# Patient Record
Sex: FEMALE | Race: WHITE | NOT HISPANIC OR LATINO | Employment: OTHER | URBAN - METROPOLITAN AREA
[De-identification: names, ages, dates, MRNs, and addresses within clinical notes are randomized per-mention and may not be internally consistent; named-entity substitution may affect disease eponyms.]

---

## 2017-03-27 DIAGNOSIS — Z32.01 PREGNANCY TEST POSITIVE: Primary | ICD-10-CM

## 2017-04-06 ENCOUNTER — PRENATAL OFFICE VISIT (OUTPATIENT)
Dept: NURSING | Facility: CLINIC | Age: 27
End: 2017-04-06
Payer: COMMERCIAL

## 2017-04-06 DIAGNOSIS — Z32.01 PREGNANCY TEST POSITIVE: Primary | ICD-10-CM

## 2017-04-06 LAB
ABO + RH BLD: NORMAL
ABO + RH BLD: NORMAL
BETA HCG QUAL IFA URINE: POSITIVE
BLD GP AB SCN SERPL QL: NORMAL
BLOOD BANK CMNT PATIENT-IMP: NORMAL
ERYTHROCYTE [DISTWIDTH] IN BLOOD BY AUTOMATED COUNT: 12.6 % (ref 10–15)
HCT VFR BLD AUTO: 38.5 % (ref 35–47)
HGB BLD-MCNC: 12.9 G/DL (ref 11.7–15.7)
MCH RBC QN AUTO: 30.4 PG (ref 26.5–33)
MCHC RBC AUTO-ENTMCNC: 33.5 G/DL (ref 31.5–36.5)
MCV RBC AUTO: 91 FL (ref 78–100)
PLATELET # BLD AUTO: 353 10E9/L (ref 150–450)
RBC # BLD AUTO: 4.25 10E12/L (ref 3.8–5.2)
SPECIMEN EXP DATE BLD: NORMAL
WBC # BLD AUTO: 8.8 10E9/L (ref 4–11)

## 2017-04-06 PROCEDURE — 86900 BLOOD TYPING SEROLOGIC ABO: CPT | Performed by: OBSTETRICS & GYNECOLOGY

## 2017-04-06 PROCEDURE — 86901 BLOOD TYPING SEROLOGIC RH(D): CPT | Performed by: OBSTETRICS & GYNECOLOGY

## 2017-04-06 PROCEDURE — 84703 CHORIONIC GONADOTROPIN ASSAY: CPT | Performed by: OBSTETRICS & GYNECOLOGY

## 2017-04-06 PROCEDURE — 99207 ZZC NO CHARGE NURSE ONLY: CPT

## 2017-04-06 PROCEDURE — 87340 HEPATITIS B SURFACE AG IA: CPT | Performed by: OBSTETRICS & GYNECOLOGY

## 2017-04-06 PROCEDURE — 86762 RUBELLA ANTIBODY: CPT | Performed by: OBSTETRICS & GYNECOLOGY

## 2017-04-06 PROCEDURE — 87086 URINE CULTURE/COLONY COUNT: CPT | Performed by: OBSTETRICS & GYNECOLOGY

## 2017-04-06 PROCEDURE — 36415 COLL VENOUS BLD VENIPUNCTURE: CPT | Performed by: OBSTETRICS & GYNECOLOGY

## 2017-04-06 PROCEDURE — 86850 RBC ANTIBODY SCREEN: CPT | Performed by: OBSTETRICS & GYNECOLOGY

## 2017-04-06 PROCEDURE — 86780 TREPONEMA PALLIDUM: CPT | Performed by: OBSTETRICS & GYNECOLOGY

## 2017-04-06 PROCEDURE — 87389 HIV-1 AG W/HIV-1&-2 AB AG IA: CPT | Performed by: OBSTETRICS & GYNECOLOGY

## 2017-04-06 PROCEDURE — 85027 COMPLETE CBC AUTOMATED: CPT | Performed by: OBSTETRICS & GYNECOLOGY

## 2017-04-06 RX ORDER — ESCITALOPRAM OXALATE 20 MG/1
20 TABLET ORAL DAILY
COMMUNITY
End: 2017-08-10

## 2017-04-06 NOTE — MR AVS SNAPSHOT
After Visit Summary   4/6/2017    Paulo Lema    MRN: [id number]           Patient Information     Date Of Birth          1990        Visit Information        Provider Department      4/6/2017 9:00 AM RI PRENATAL NURSE [facility]        Today's Diagnoses     Pregnancy test positive    -  1       Follow-ups after your visit        Your next 10 appointments already scheduled     Apr 14, 2017 10:00 AM CDT   New Prenatal with Karissa Wilkins MD   [facility] ([facility])    [address] [place] [id number] [phone]            Apr 14, 2017 11:15 AM CDT   US OB < 14 WEEKS WITH TRANSVAGINAL SINGLE with RIUS1   [facility] ([facility])    [address]  [address]  [facility] [address] [phone]           Please bring a list of your medicines (including vitamins, minerals and over-the-counter drugs). Also, tell your doctor about any allergies you may have. Wear comfortable clothes and leave your valuables at home.  If you re less than 20 weeks drink four 8-ounce glasses of fluid an hour before your exam. If you need to empty your bladder before your exam, try to release only a little urine. Then, drink another glass of fluid.  You may have up to two family members in the exam room. If you bring a small child, an adult must be there to care for him or her.  Please call the Imaging Department at your exam site with any questions.              Future tests that were ordered for you today     Open Future Orders        Priority Expected Expires Ordered    US OB < 14 Weeks w Transvaginal Routine  4/6/2018 4/6/2017            Who to contact     If you have questions or need follow up information about today's clinic visit or your schedule please contact [facility] directly at [phone].  Normal or non-critical lab and imaging results will be communicated to you by  "MyChart, letter or phone within 4 business days after the clinic has received the results. If you do not hear from us within 7 days, please contact the clinic through Carlypsohart or phone. If you have a critical or abnormal lab result, we will notify you by phone as soon as possible.  Submit refill requests through Payment plugin or call your pharmacy and they will forward the refill request to us. Please allow 3 business days for your refill to be completed.          Additional Information About Your Visit        CarlypsoharBakedCode Information     Payment plugin lets you send messages to your doctor, view your test results, renew your prescriptions, schedule appointments and more. To sign up, go to [email or web address] . Click on \"Log in\" on the left side of the screen, which will take you to the Welcome page. Then click on \"Sign up Now\" on the right side of the page.     You will be asked to enter the access code listed below, as well as some personal information. Please follow the directions to create your username and password.     Your access code is: [email or web address]  Expires: 2017 11:26 AM     Your access code will  in 90 days. If you need help or a new code, please call your [place] clinic or [phone].        Care EveryWhere ID     This is your Care EveryWhere ID. This could be used by other organizations to access your [place] medical records  [id number]        Your Vitals Were     Last Period                   02/10/2017 (Exact Date)            Blood Pressure from Last 3 Encounters:   No data found for BP    Weight from Last 3 Encounters:   No data found for Wt              We Performed the Following     ABO/Rh type and screen     Anti Treponema     Beta HCG qual IFA urine     CBC with platelets     Hepatitis B surface antigen     HIV Antigen Antibody Combo     Rubella Antibody IgG Quantitative     Urine Culture Aerobic Bacterial        Primary Care Provider Office Phone # Fax #    Daksha Mack [phone] " [phone]       [facility] [place] [address]  [place] [address]        Thank you!     Thank you for choosing [facility]  for your care. Our goal is always to provide you with excellent care. Hearing back from our patients is one way we can continue to improve our services. Please take a few minutes to complete the written survey that you may receive in the mail after your visit with us. Thank you!             Your Updated Medication List - Protect others around you: Learn how to safely use, store and throw away your medicines at www.disposemymeds.org.          This list is accurate as of: 4/6/17 11:26 AM.  Always use your most recent med list.                   Brand Name Dispense Instructions for use    escitalopram 20 MG tablet    LEXAPRO     Take 20 mg by mouth daily

## 2017-04-07 LAB
BACTERIA SPEC CULT: NO GROWTH
HBV SURFACE AG SERPL QL IA: NONREACTIVE
HIV 1+2 AB+HIV1 P24 AG SERPL QL IA: NORMAL
MICRO REPORT STATUS: NORMAL
RUBV IGG SERPL IA-ACNC: 21 IU/ML
SPECIMEN SOURCE: NORMAL
T PALLIDUM IGG+IGM SER QL: NEGATIVE

## 2017-04-14 ENCOUNTER — PRENATAL OFFICE VISIT (OUTPATIENT)
Dept: OBGYN | Facility: CLINIC | Age: 27
End: 2017-04-14
Payer: COMMERCIAL

## 2017-04-14 ENCOUNTER — RADIANT APPOINTMENT (OUTPATIENT)
Dept: ULTRASOUND IMAGING | Facility: CLINIC | Age: 27
End: 2017-04-14
Attending: OBSTETRICS & GYNECOLOGY
Payer: COMMERCIAL

## 2017-04-14 VITALS
WEIGHT: 171.8 LBS | BODY MASS INDEX: 30.44 KG/M2 | DIASTOLIC BLOOD PRESSURE: 60 MMHG | HEIGHT: 63 IN | SYSTOLIC BLOOD PRESSURE: 102 MMHG | TEMPERATURE: 98 F

## 2017-04-14 DIAGNOSIS — Z34.00 SUPERVISION OF NORMAL FIRST PREGNANCY, ANTEPARTUM: Primary | ICD-10-CM

## 2017-04-14 DIAGNOSIS — Z32.01 PREGNANCY TEST POSITIVE: ICD-10-CM

## 2017-04-14 PROCEDURE — G0145 SCR C/V CYTO,THINLAYER,RESCR: HCPCS | Performed by: OBSTETRICS & GYNECOLOGY

## 2017-04-14 PROCEDURE — 76801 OB US < 14 WKS SINGLE FETUS: CPT | Performed by: OBSTETRICS & GYNECOLOGY

## 2017-04-14 PROCEDURE — 87591 N.GONORRHOEAE DNA AMP PROB: CPT | Performed by: OBSTETRICS & GYNECOLOGY

## 2017-04-14 PROCEDURE — 99207 ZZC FIRST OB VISIT: CPT | Performed by: OBSTETRICS & GYNECOLOGY

## 2017-04-14 PROCEDURE — 87491 CHLMYD TRACH DNA AMP PROBE: CPT | Performed by: OBSTETRICS & GYNECOLOGY

## 2017-04-14 RX ORDER — PRENATAL VIT/IRON FUM/FOLIC AC 27MG-0.8MG
1 TABLET ORAL DAILY
COMMUNITY
End: 2018-06-05

## 2017-04-14 NOTE — MR AVS SNAPSHOT
After Visit Summary   4/14/2017    Paulo Lema    MRN: [id number]           Patient Information     Date Of Birth          1990        Visit Information        Provider Department      4/14/2017 10:00 AM Karissa Wilkins MD [facility]        Today's Diagnoses     Supervision of normal first pregnancy, antepartum    -  1      Care Instructions    Early Pregnancy Information:    Nausea & Vomiting  Women experience this problem in varying degrees during pregnancy and you may have different experiences in subsequent pregnancies. Some women experience  morning sickness,  but it is also common to experience nausea any time throughout the day.  Listen to your body and eat the kinds of foods that make you feel best.  Suggestions for Diet  The most important rule is to eat small amounts often - even if you are not hungry. Try not to go more than three hours without eating during the day or ten hours at night. An empty stomach triggers nausea.   Eat slowly and avoid foods that are spicy or high in fat. These are difficult to digest. Do not overfill your stomach.   Drink fruit juices, water and milk between meals.   Eat a few crackers, dry toast or vanilla wafers before getting out of bed in the morning. Stay in bed 15-20 minutes after eating.  Give yourself extra time in the morning.   Do not brush your teeth until you have been up for a while.   Do not skip breakfast.   Have a snack at bedtime that includes both carbohydrates and protein, e.g., peanut butter toast or hot chocolate made with milk.  A specific food or drink may trigger nausea in one woman and alleviate it in another. Find out what works best for you and eliminate the foods that cause nausea.   Most women tolerate ice cold drinks and foods best. Sherbet and fruit juices are good examples.   Try avoid coffee and products containing caffeine; it increases stomach acid. About 1 cup of coffee daily is considered safe in  pregnancy, but this may be triggering your nausea.  Avoid smoking: it also increases stomach acid.  Rest  Your body requires more sleep in early pregnancy. Try to get plenty of sleep at night or take a short nap during the day. Being tired does often trigger nausea. If your nausea is worse in the evening, try taking a nap before dinner.  Exercise  Energy levels are normally low in early pregnancy and exercise may be the last thing you d think of to relieve nausea, but getting out and walking briskly for 30 minutes each day will increase metabolism, relieve stress and psychologically improve your outlook.  Vitamins  Vitamins B6 and Vitamin C may help improve nausea. There have been no definite studies to prove this effective, but some women do improve.   To prevent nausea take: 25 mg of Vitamin B6 daily. You can take this up to 3 times daily. You may have to cut a tablet in half to get to 25mg   Take: 500 mg. Vitamin C daily.   Yogurt is a good source of the B Vitamins.   If taking your prenatal vitamin increases or causes nausea, stop for 7-10 days, then try again. You can also try switching to a formulation without iron in early pregnancy.   Medication and other remedies  Unisom, taken as directed, may be used with Vitamin B6. Take 25mg of unisom at night, this can make you drowsy.   Cassi tablets, 250 mg, 2-4 times per day   Acupressure Wrist Bands (Sea Bands)   Peppermint or cassi decaffeinated tea   Peppermint gum or candy  Inform your doctor if:  You cannot keep any solid food down for 24 hours.   You cannot keep liquids down.   You are losing weight.   You are running a temperature greater than 100  F.    Common Ailments:  Below is a list of medications that are safe to take in pregnancy. This is not everything that is safe, but merely a list of over the counter options to get you through frequently experienced symptoms.     Upper Respiratory Infections:  Sinus congestion and colds - Plain Sudafed, Tylenol  Sinus  Antihistamines -  Claritin, Benadryl, Zyrtec  Avoid nasal sprays, except for Saline only.  Sore Throat:  Lozenges - Cepacol, Chloraseptic  Cough drops - Halls, Vicks, or lemon drops  Headache, Pain, or Fever:  Tylenol or other acetaminophen products: 650-1000 mg every 6-8 hours (up to 3 gm/24 hours) as needed. If not relieved, call the office.  Heartburn:  Sodium-free antacids - Gaviscon, Maalox, Mylanta, Tums  Acid Reflux - Prilosec, Zantac 75 mg 1 to 2 times daily  Gas: Simethicone products - Gas-X  Constipation:  Fiber supplements - Fibercon, Metamucil (powder, capsules, or wafers)  Stool softeners - Colace (Docusate Sodium),   Laxatives -Milk of Magnesia, Senna, Miralax  Diarrhea:  Imodium, Imodium AD  Avoid dairy products and stop prenatal vitamins until diarrhea subsides. If not resolved in 24-36 hours, call the office.  Insect Bites and Rashes:  Lotions - Calamine, Caladryl, Hydrocortisone 1%, DEET bug spray  Sprays - DEET bug spray  If rash is unusual or persists, call the office.  Hemorrhoids:  Preparation H, Anusol, Tucks pads      Call the clinic ([facility] [phone], [place] [phone]) right away with any of the following concerns. These phones are answered at all hours:    1.   Severe abdominal pain or cramping, that does not go away with rest and hydration    2.   Vaginal bleeding.      Continue your prenatal vitamins daily.    Please call with any additional concerns that your have, and continue your prenatal visits every four weeks!         Follow-ups after your visit        Your next 10 appointments already scheduled     Apr 14, 2017 11:15 AM CDT   US OB < 14 WEEKS WITH TRANSVAGINAL SINGLE with RIUS1   [facility]    [address]  [facility] [address] [phone]           Please bring a list of your medicines (including vitamins, minerals and over-the-counter drugs). Also, tell your doctor about any  "allergies you may have. Wear comfortable clothes and leave your valuables at home.  If you re less than 20 weeks drink four 8-ounce glasses of fluid an hour before your exam. If you need to empty your bladder before your exam, try to release only a little urine. Then, drink another glass of fluid.  You may have up to two family members in the exam room. If you bring a small child, an adult must be there to care for him or her.  Please call the Imaging Department at your exam site with any questions.              Who to contact     If you have questions or need follow up information about today's clinic visit or your schedule please contact [facility] directly at [phone].  Normal or non-critical lab and imaging results will be communicated to you by Insuritashart, letter or phone within 4 business days after the clinic has received the results. If you do not hear from us within 7 days, please contact the clinic through Insuritashart or phone. If you have a critical or abnormal lab result, we will notify you by phone as soon as possible.  Submit refill requests through Moondo or call your pharmacy and they will forward the refill request to us. Please allow 3 business days for your refill to be completed.          Additional Information About Your Visit        InsuritasharBrightTALK Information     Moondo lets you send messages to your doctor, view your test results, renew your prescriptions, schedule appointments and more. To sign up, go to [email or web address] . Click on \"Log in\" on the left side of the screen, which will take you to the Welcome page. Then click on \"Sign up Now\" on the right side of the page.     You will be asked to enter the access code listed below, as well as some personal information. Please follow the directions to create your username and password.     Your access code is: [email or web address]  Expires: 2017 11:26 AM     Your access code will  in 90 days. If you need help or a new code, " "please call your [place] clinic or [phone].        Care EveryWhere ID     This is your Care EveryWhere ID. This could be used by other organizations to access your [place] medical records  [id number]        Your Vitals Were     Temperature Height Last Period Breastfeeding? BMI (Body Mass Index)       98  F (36.7  C) (Oral) 5' 3\" (1.6 m) 02/10/2017 (Exact Date) No 30.43 kg/m2        Blood Pressure from Last 3 Encounters:   04/14/17 102/60    Weight from Last 3 Encounters:   04/14/17 171 lb 12.8 oz (77.9 kg)              Today, you had the following     No orders found for display       Primary Care Provider Office Phone # Fax #    Daksha Mack [phone]       [facility] [address] [place] [address]  [place] [address]        Thank you!     Thank you for choosing [facility]  for your care. Our goal is always to provide you with excellent care. Hearing back from our patients is one way we can continue to improve our services. Please take a few minutes to complete the written survey that you may receive in the mail after your visit with us. Thank you!             Your Updated Medication List - Protect others around you: Learn how to safely use, store and throw away your medicines at www.disposemymeds.org.          This list is accurate as of: 4/14/17 10:30 AM.  Always use your most recent med list.                   Brand Name Dispense Instructions for use    escitalopram 20 MG tablet    LEXAPRO     Take 20 mg by mouth daily       prenatal multivitamin  plus iron 27-0.8 MG Tabs per tablet      Take 1 tablet by mouth daily         "

## 2017-04-14 NOTE — PROGRESS NOTES
"This is a 26 year old female patient,   who presents for her first obstetrical visit.    EDC 2017 by LMP which makes her 9w0d today.  Her cycles are regular.  This is a planned pregnancy, she and her  have been trying for about 6 months.     Paulo works as a .     Current Issues include: daily nausea and multiple episodes of emesis throughout the day. This has been improved with B6 and iman supplements. She has unisom for night if needed. She eats throughout the day which does help nausea.    Since her last LMP she denies use of alcohol, tobacco and street drugs.    OBhx: never pregnant  Obstetric History       T0      TAB0   SAB0   E0   M0   L0       # Outcome Date GA Lbr González/2nd Weight Sex Delivery Anes PTL Lv   1 Current                   ROS: Ten point review of systems was reviewed and negative except the above.    HISTORY:  No past medical history on file.  No past surgical history on file.  No family history on file.  Social History     Social History     Marital status:      Spouse name: Brennan     Number of children: N/A     Years of education: N/A     Occupational History     Teacher      Social History Main Topics     Smoking status: Never Smoker     Smokeless tobacco: Not on file     Alcohol use No     Drug use: No     Sexual activity: Yes     Partners: Male     Other Topics Concern     Not on file     Social History Narrative     No narrative on file     Current Outpatient Prescriptions   Medication Sig     escitalopram (LEXAPRO) 20 MG tablet Take 20 mg by mouth daily     No current facility-administered medications for this visit.      No Known Allergies    Past medical, surgical, social and family history were reviewed and updated in [facility].      EXAM:  /60  Temp 98  F (36.7  C) (Oral)  Ht 5' 3\" (1.6 m)  Wt 171 lb 12.8 oz (77.9 kg)  LMP 02/10/2017 (Exact Date)  Breastfeeding? No  BMI 30.43 kg/m2     Gen:  no acute distress, " comfortable  HENT: No scleral injection or icterus  CV: Regular rate and rhythm, no murmur  Resp: CTAB, Normal work of breathing, no cough  Breast: No visible masses or suspicious skin changes.  No discrete or dominant masses to palpation.  No nipple discharge. No axillary lymphadenopathy.  GI: Abdomen soft, non-tender. No masses, organomegaly  Skin: No suspicious lesions or rashes  Psychiatric: mentation appears normal and affect bright   Pelvis: External genitalia within normal limits. Urethra is without lesion. Bladder is nontender.   On speculum exam, cervix is without lesion and vagina is normal without lesion or discharge. Pap smear obtained without incident.       Recent Labs   Lab Test  17   1014   ABO  A   RH   Pos   AS  Neg     Rhogam not indicated     Recent Labs   Lab Test  17   1014   HEPBANG  Nonreactive   HIAGAB  Nonreactive   HIV-1 p24 Ag & HIV-1/HIV-2 Ab Not Detected     RUQIGG  21       Treponemal antibody neg    CBC RESULTS:   Recent Labs   Lab Test  17   1014   WBC  8.8   RBC  4.25   HGB  12.9   HCT  38.5   MCV  91   MCH  30.4   MCHC  33.5   RDW  12.6   PLT  353       ASSESSMENT:  26 year old  at 9w0d dated by LMP, US pending today, here for NOB visit.        PLAN:    1)Prenatal labs reviewed. She has no questions.  2) EDUCATION : RECOMMENDED WEIGHT GAIN: 11-20 lbs given Body mass index is 30.43 kg/(m^2)..   - Instructed on best evidence for: healthy diet and foods to avoid; exercise and activity during pregnancy; and maintenance of a generally healthy lifestyle. Reviewed early pregnancy education, provider coverage, labor and delivery, and prenatal visits.  Discussed the harms, benefits, side effects and alternative therapies for current prescribed and OTC medications.  - recommend PNV  3) Discussed options for screening for chromosomal anomalies, including first screen, noninvasive prenatal testing, CVS/amniocentesis, quad screen, and ultrasound at 18-20 weeks. She is  electing first trimester screen and ultrasound at 18-20 weeks. Genetic counseling referral placed.    Follow up in 5 weeks. She is encouraged to call sooner with questions or concerns.    Karissa Wilkins MD   Obstetrics and Gynecology

## 2017-04-14 NOTE — LETTER
April 20, 2017      Paulo Lema  [address] [facility] [address]    Dear ,      I am happy to inform you that your recent cervical cancer screening test (PAP smear) was normal.      Preventative screenings such as this help to ensure your health for years to come. You should repeat a pap smear in 3 years, unless otherwise directed.      You will still need to return to the clinic every year for your annual exam and other preventive tests.     Please contact the clinic at [phone] if you have further questions.       Sincerely,      Karissa Wilkins MD

## 2017-04-14 NOTE — PATIENT INSTRUCTIONS
Early Pregnancy Information:    Nausea & Vomiting  Women experience this problem in varying degrees during pregnancy and you may have different experiences in subsequent pregnancies. Some women experience  morning sickness,  but it is also common to experience nausea any time throughout the day.  Listen to your body and eat the kinds of foods that make you feel best.  Suggestions for Diet  The most important rule is to eat small amounts often - even if you are not hungry. Try not to go more than three hours without eating during the day or ten hours at night. An empty stomach triggers nausea.   Eat slowly and avoid foods that are spicy or high in fat. These are difficult to digest. Do not overfill your stomach.   Drink fruit juices, water and milk between meals.   Eat a few crackers, dry toast or vanilla wafers before getting out of bed in the morning. Stay in bed 15-20 minutes after eating.  Give yourself extra time in the morning.   Do not brush your teeth until you have been up for a while.   Do not skip breakfast.   Have a snack at bedtime that includes both carbohydrates and protein, e.g., peanut butter toast or hot chocolate made with milk.  A specific food or drink may trigger nausea in one woman and alleviate it in another. Find out what works best for you and eliminate the foods that cause nausea.   Most women tolerate ice cold drinks and foods best. Sherbet and fruit juices are good examples.   Try avoid coffee and products containing caffeine; it increases stomach acid. About 1 cup of coffee daily is considered safe in pregnancy, but this may be triggering your nausea.  Avoid smoking: it also increases stomach acid.  Rest  Your body requires more sleep in early pregnancy. Try to get plenty of sleep at night or take a short nap during the day. Being tired does often trigger nausea. If your nausea is worse in the evening, try taking a nap before dinner.  Exercise  Energy levels are normally low in early  pregnancy and exercise may be the last thing you d think of to relieve nausea, but getting out and walking briskly for 30 minutes each day will increase metabolism, relieve stress and psychologically improve your outlook.  Vitamins  Vitamins B6 and Vitamin C may help improve nausea. There have been no definite studies to prove this effective, but some women do improve.   To prevent nausea take: 25 mg of Vitamin B6 daily. You can take this up to 3 times daily. You may have to cut a tablet in half to get to 25mg   Take: 500 mg. Vitamin C daily.   Yogurt is a good source of the B Vitamins.   If taking your prenatal vitamin increases or causes nausea, stop for 7-10 days, then try again. You can also try switching to a formulation without iron in early pregnancy.   Medication and other remedies  Unisom, taken as directed, may be used with Vitamin B6. Take 25mg of unisom at night, this can make you drowsy.   Ginger tablets, 250 mg, 2-4 times per day   Acupressure Wrist Bands (Sea Bands)   Peppermint or ginger decaffeinated tea   Peppermint gum or candy  Inform your doctor if:  You cannot keep any solid food down for 24 hours.   You cannot keep liquids down.   You are losing weight.   You are running a temperature greater than 100  F.    Common Ailments:  Below is a list of medications that are safe to take in pregnancy. This is not everything that is safe, but merely a list of over the counter options to get you through frequently experienced symptoms.     Upper Respiratory Infections:  Sinus congestion and colds - Plain Sudafed, Tylenol Sinus  Antihistamines -  Claritin, Benadryl, Zyrtec  Avoid nasal sprays, except for Saline only.  Sore Throat:  Lozenges - Cepacol, Chloraseptic  Cough drops - Halls, Vicks, or lemon drops  Headache, Pain, or Fever:  Tylenol or other acetaminophen products: 650-1000 mg every 6-8 hours (up to 3 gm/24 hours) as needed. If not relieved, call the office.  Heartburn:  Sodium-free antacids -  Gaviscon, Maalox, Mylanta, Tums  Acid Reflux - Prilosec, Zantac 75 mg 1 to 2 times daily  Gas: Simethicone products - Gas-X  Constipation:  Fiber supplements - Fibercon, Metamucil (powder, capsules, or wafers)  Stool softeners - Colace (Docusate Sodium),   Laxatives -Milk of Magnesia, Senna, Miralax  Diarrhea:  Imodium, Imodium AD  Avoid dairy products and stop prenatal vitamins until diarrhea subsides. If not resolved in 24-36 hours, call the office.  Insect Bites and Rashes:  Lotions - Calamine, Caladryl, Hydrocortisone 1%, DEET bug spray  Sprays - DEET bug spray  If rash is unusual or persists, call the office.  Hemorrhoids:  Preparation H, Anusol, Tucks pads      Call the clinic ([facility] [phone], [place] [phone]) right away with any of the following concerns. These phones are answered at all hours:    1.   Severe abdominal pain or cramping, that does not go away with rest and hydration    2.   Vaginal bleeding.      Continue your prenatal vitamins daily.    Please call with any additional concerns that your have, and continue your prenatal visits every four weeks!

## 2017-04-17 LAB
C TRACH DNA SPEC QL NAA+PROBE: NORMAL
N GONORRHOEA DNA SPEC QL NAA+PROBE: NORMAL
SPECIMEN SOURCE: NORMAL
SPECIMEN SOURCE: NORMAL

## 2017-04-18 LAB
COPATH REPORT: NORMAL
PAP: NORMAL

## 2017-05-02 ENCOUNTER — PRE VISIT (OUTPATIENT)
Dept: MATERNAL FETAL MEDICINE | Facility: CLINIC | Age: 27
End: 2017-05-02

## 2017-05-16 ENCOUNTER — TELEPHONE (OUTPATIENT)
Dept: OBGYN | Facility: CLINIC | Age: 27
End: 2017-05-16

## 2017-05-16 DIAGNOSIS — O21.9 NAUSEA/VOMITING IN PREGNANCY: Primary | ICD-10-CM

## 2017-05-16 RX ORDER — METOCLOPRAMIDE 10 MG/1
10 TABLET ORAL 4 TIMES DAILY PRN
Qty: 120 TABLET | Refills: 1 | Status: SHIPPED | OUTPATIENT
Start: 2017-05-16 | End: 2017-08-10

## 2017-05-16 NOTE — TELEPHONE ENCOUNTER
Reason for call: Symptom   Symptom or request: All day sickness    Duration (how long have symptoms been present): Entire pregnancy  Have you been treated for this before? Pt tried B6, iman root vitamin,    Additional comments: Pt states nausea is not going away after taking medications for over a month, sickness is all day not just in the morning    Phone Number Pt can be reached at: Cell number on file:  [phone]  Best Time: anytime  Can we leave a detailed message on this number? YES

## 2017-05-16 NOTE — TELEPHONE ENCOUNTER
Pt is requesting med for nausea.    I tried to call pt to get more info and LM.    Shelly Mathis R.N.

## 2017-05-16 NOTE — TELEPHONE ENCOUNTER
Please place orders for reglan 10mg 4 times per day as needed for her nausea.     Karissa Wilkins MD

## 2017-05-19 ENCOUNTER — TELEPHONE (OUTPATIENT)
Dept: OBGYN | Facility: CLINIC | Age: 27
End: 2017-05-19

## 2017-05-19 ENCOUNTER — PRENATAL OFFICE VISIT (OUTPATIENT)
Dept: OBGYN | Facility: CLINIC | Age: 27
End: 2017-05-19
Payer: COMMERCIAL

## 2017-05-19 VITALS
DIASTOLIC BLOOD PRESSURE: 60 MMHG | BODY MASS INDEX: 31.05 KG/M2 | TEMPERATURE: 98 F | WEIGHT: 175.3 LBS | SYSTOLIC BLOOD PRESSURE: 118 MMHG

## 2017-05-19 DIAGNOSIS — Z34.00 SUPERVISION OF NORMAL FIRST PREGNANCY, ANTEPARTUM: Primary | ICD-10-CM

## 2017-05-19 PROCEDURE — 99207 ZZC PRENATAL VISIT: CPT | Performed by: OBSTETRICS & GYNECOLOGY

## 2017-05-19 NOTE — NURSING NOTE
"Chief Complaint   Patient presents with     Prenatal Care   13w6d  Marleny Almaraz MA      Initial /60 (BP Location: Left arm, Patient Position: Chair, Cuff Size: Adult Regular)  Temp 98  F (36.7  C) (Oral)  Wt 175 lb 4.8 oz (79.5 kg)  LMP 02/10/2017 (Exact Date)  BMI 31.05 kg/m2 Estimated body mass index is 31.05 kg/(m^2) as calculated from the following:    Height as of 17: 5' 3\" (1.6 m).    Weight as of this encounter: 175 lb 4.8 oz (79.5 kg).  BP completed using cuff size: regular        The following HM Due: NONE                "

## 2017-05-19 NOTE — TELEPHONE ENCOUNTER
VAUGHN calling to notify us that pt is too far along in her pregnancy to have the first trimester screen done.     BRISA Sanches RN

## 2017-05-19 NOTE — MR AVS SNAPSHOT
After Visit Summary   5/19/2017    Paulo Lema    MRN: [id number]           Patient Information     Date Of Birth          1990        Visit Information        Provider Department      5/19/2017 11:15 AM Karissa Wilkins MD [facility]        Today's Diagnoses     Supervision of normal first pregnancy, antepartum    -  1       Follow-ups after your visit        Your next 10 appointments already scheduled     Jun 16, 2017 10:30 AM CDT   US OB > 14 WEEKS COMPLETE SINGLE with RIUS[facility]   [facility]    [address]  [address]  [facility] [id number] [phone]           Please bring a list of your medicines (including vitamins, minerals and over-the-counter drugs). Also, tell your doctor about any allergies you may have. Wear comfortable clothes and leave your valuables at home.  If you re less than 20 weeks drink four 8-ounce glasses of fluid an hour before your exam. If you need to empty your bladder before your exam, try to release only a little urine. Then, drink another glass of fluid.  You may have up to two family members in the exam room. If you bring a small child, an adult must be there to care for him or her.  Please call the Imaging Department at your exam site with any questions.            Jun 16, 2017 11:15 AM CDT   ESTABLISHED PRENATAL with Karissa Wilkins MD   [facility]    [address] [place] [id number] [phone]              Future tests that were ordered for you today     Open Future Orders        Priority Expected Expires Ordered    US OB > 14 Weeks Complete Single Routine 6/19/2017 5/19/2018 5/19/2017            Who to contact     If you have questions or need follow up information about today's clinic visit or your schedule please contact [facility] directly at [phone].  Normal or non-critical lab and  "imaging results will be communicated to you by MyChart, letter or phone within 4 business days after the clinic has received the results. If you do not hear from us within 7 days, please contact the clinic through [facility] or phone. If you have a critical or abnormal lab result, we will notify you by phone as soon as possible.  Submit refill requests through [employer] or call your pharmacy and they will forward the refill request to us. Please allow 3 business days for your refill to be completed.          Additional Information About Your Visit        [employer] Information     [employer] lets you send messages to your doctor, view your test results, renew your prescriptions, schedule appointments and more. To sign up, go to [email or web address] . Click on \"Log in\" on the left side of the screen, which will take you to the Welcome page. Then click on \"Sign up Now\" on the right side of the page.     You will be asked to enter the access code listed below, as well as some personal information. Please follow the directions to create your username and password.     Your access code is: [email or web address]  Expires: 2017 11:26 AM     Your access code will  in 90 days. If you need help or a new code, please call your [facility] clinic or [phone].        Care EveryWhere ID     This is your Care EveryWhere ID. This could be used by other organizations to access your [facility] medical records  [id number]        Your Vitals Were     Temperature Last Period BMI (Body Mass Index)             98  F (36.7  C) (Oral) 02/10/2017 (Exact Date) 31.05 kg/m2          Blood Pressure from Last 3 Encounters:   17 118/60   17 102/60    Weight from Last 3 Encounters:   17 175 lb 4.8 oz (79.5 kg)   17 171 lb 12.8 oz (77.9 kg)               Primary Care Provider Office Phone # Fax #    Daksha Mack [phone]       [facility] [address] [place], [place] [address]ERT[facility]  [place] [address]        Thank you!     " Thank you for choosing [facility]  for your care. Our goal is always to provide you with excellent care. Hearing back from our patients is one way we can continue to improve our services. Please take a few minutes to complete the written survey that you may receive in the mail after your visit with us. Thank you!             Your Updated Medication List - Protect others around you: Learn how to safely use, store and throw away your medicines at www.disposemymeds.org.          This list is accurate as of: 5/19/17  1:05 PM.  Always use your most recent med list.                   Brand Name Dispense Instructions for use    escitalopram 20 MG tablet    LEXAPRO     Take 20 mg by mouth daily       metoclopramide 10 MG tablet    REGLAN    120 tablet    Take 1 tablet (10 mg) by mouth 4 times daily as needed For nausea       prenatal multivitamin  plus iron 27-0.8 MG Tabs per tablet      Take 1 tablet by mouth daily

## 2017-05-19 NOTE — TELEPHONE ENCOUNTER
Please offer patient quad screen. She can return after 15w for RN only appointment and lab draw if she would like to pursue this option.     Thanks,  Karissa Wilkins MD

## 2017-05-21 ENCOUNTER — HOSPITAL ENCOUNTER (EMERGENCY)
Facility: CLINIC | Age: 27
Discharge: HOME | End: 2017-05-22
Attending: EMERGENCY MEDICINE | Admitting: EMERGENCY MEDICINE
Payer: COMMERCIAL

## 2017-05-21 DIAGNOSIS — O21.9 NAUSEA AND VOMITING IN PREGNANCY: ICD-10-CM

## 2017-05-21 LAB
ANION GAP SERPL CALCULATED.3IONS-SCNC: 7 MMOL/L (ref 3–14)
BASOPHILS # BLD AUTO: 0 10E9/L (ref 0–0.2)
BASOPHILS NFR BLD AUTO: 0.2 %
BUN SERPL-MCNC: 6 MG/DL (ref 7–30)
CALCIUM SERPL-MCNC: 8.9 MG/DL (ref 8.5–10.1)
CHLORIDE SERPL-SCNC: 104 MMOL/L (ref 94–109)
CO2 SERPL-SCNC: 26 MMOL/L (ref 20–32)
CREAT SERPL-MCNC: 0.52 MG/DL (ref 0.52–1.04)
DIFFERENTIAL METHOD BLD: ABNORMAL
EOSINOPHIL # BLD AUTO: 0.3 10E9/L (ref 0–0.7)
EOSINOPHIL NFR BLD AUTO: 2.2 %
ERYTHROCYTE [DISTWIDTH] IN BLOOD BY AUTOMATED COUNT: 12.6 % (ref 10–15)
GFR SERPL CREATININE-BSD FRML MDRD: ABNORMAL ML/MIN/1.7M2
GLUCOSE SERPL-MCNC: 94 MG/DL (ref 70–99)
HCT VFR BLD AUTO: 34.1 % (ref 35–47)
HGB BLD-MCNC: 11.7 G/DL (ref 11.7–15.7)
IMM GRANULOCYTES # BLD: 0.1 10E9/L (ref 0–0.4)
IMM GRANULOCYTES NFR BLD: 0.5 %
LYMPHOCYTES # BLD AUTO: 2.6 10E9/L (ref 0.8–5.3)
LYMPHOCYTES NFR BLD AUTO: 19.9 %
MCH RBC QN AUTO: 30.2 PG (ref 26.5–33)
MCHC RBC AUTO-ENTMCNC: 34.3 G/DL (ref 31.5–36.5)
MCV RBC AUTO: 88 FL (ref 78–100)
MONOCYTES # BLD AUTO: 0.7 10E9/L (ref 0–1.3)
MONOCYTES NFR BLD AUTO: 5 %
NEUTROPHILS # BLD AUTO: 9.5 10E9/L (ref 1.6–8.3)
NEUTROPHILS NFR BLD AUTO: 72.2 %
NRBC # BLD AUTO: 0 10*3/UL
NRBC BLD AUTO-RTO: 0 /100
PLATELET # BLD AUTO: 321 10E9/L (ref 150–450)
POTASSIUM SERPL-SCNC: 3.4 MMOL/L (ref 3.4–5.3)
RBC # BLD AUTO: 3.87 10E12/L (ref 3.8–5.2)
SODIUM SERPL-SCNC: 137 MMOL/L (ref 133–144)
WBC # BLD AUTO: 13.1 10E9/L (ref 4–11)

## 2017-05-21 PROCEDURE — 85025 COMPLETE CBC W/AUTO DIFF WBC: CPT | Performed by: EMERGENCY MEDICINE

## 2017-05-21 PROCEDURE — 96361 HYDRATE IV INFUSION ADD-ON: CPT

## 2017-05-21 PROCEDURE — 25000128 H RX IP 250 OP 636: Performed by: EMERGENCY MEDICINE

## 2017-05-21 PROCEDURE — 96374 THER/PROPH/DIAG INJ IV PUSH: CPT

## 2017-05-21 PROCEDURE — 80048 BASIC METABOLIC PNL TOTAL CA: CPT | Performed by: EMERGENCY MEDICINE

## 2017-05-21 PROCEDURE — 99284 EMERGENCY DEPT VISIT MOD MDM: CPT | Mod: 25

## 2017-05-21 PROCEDURE — 96375 TX/PRO/DX INJ NEW DRUG ADDON: CPT

## 2017-05-21 RX ORDER — DIPHENHYDRAMINE HYDROCHLORIDE 50 MG/ML
25 INJECTION INTRAMUSCULAR; INTRAVENOUS ONCE
Status: COMPLETED | OUTPATIENT
Start: 2017-05-21 | End: 2017-05-21

## 2017-05-21 RX ORDER — METOCLOPRAMIDE HYDROCHLORIDE 5 MG/ML
10 INJECTION INTRAMUSCULAR; INTRAVENOUS ONCE
Status: COMPLETED | OUTPATIENT
Start: 2017-05-21 | End: 2017-05-21

## 2017-05-21 RX ADMIN — DIPHENHYDRAMINE HYDROCHLORIDE 25 MG: 50 INJECTION, SOLUTION INTRAMUSCULAR; INTRAVENOUS at 22:57

## 2017-05-21 RX ADMIN — METOCLOPRAMIDE 5 MG: 5 INJECTION, SOLUTION INTRAMUSCULAR; INTRAVENOUS at 22:57

## 2017-05-21 RX ADMIN — SODIUM CHLORIDE 1000 ML: 9 INJECTION, SOLUTION INTRAVENOUS at 23:12

## 2017-05-21 NOTE — ED AVS SNAPSHOT
[facility] Emergency Department    [address] [facility] [address]    Phone:  [phone]    Fax:  [phone]                                       Paulo Lema   MRN: [id number]    Department:  [facility] Emergency Department   Date of Visit:  5/21/2017           Patient Information     Date Of Birth          1990        Your diagnoses for this visit were:     Nausea and vomiting in pregnancy        You were seen by Yana Montesinos MD.      Follow-up Information     Follow up with Daksha Mack MD In 3 days.    Specialty:  Family Practice    Contact information:    [facility]  [address]   [place] [address]  [phone]          Follow up with Karissa Wilkins MD In 3 days.    Specialty:  OB/Gyn    Contact information:    [facility]  [address]  [facility] [address]  [phone]          Discharge Instructions       Please follow up closely with your Ob-gyn. Please return to the ED if your symptoms worsen or if you develop new or concerning symptoms.       Discharge Instructions  Vomiting    You have been seen today for vomiting. This is usually caused by a virus, but some bacteria, parasites, medicines or other medical conditions can cause similar symptoms. At this time your doctor does not find that your vomiting is a sign of anything dangerous or life-threatening. However, sometimes the signs of serious illness do not show up right away. If you have new or worse symptoms, you may need to be seen again in the emergency department or by your primary doctor. Remember that serious problems like appendicitis can start as vomiting.     Return to the Emergency Department if:    You keep throwing up and you are not able to keep liquids down.     You feel you are getting dehydrated, such as being very thirsty, not urinating at least every 8-12 hours, or feeling faint or lightheaded.     You develop a new fever, or  your fever continues for more than 2 days.     You have belly pain that seems worse than cramps, is in one spot, or is getting worse over time.     You have blood in your vomit or stools.     You feel very weak.    You are not starting to improve within 24 hours of your visit here.     What can I do to help myself?    The most important thing to do is to drink clear liquids. If you have been vomiting a lot, it is best to have only small, frequent sips of liquids. Drinking too much at once may cause more vomiting. If you are vomiting often, you must replace minerals, sodium and potassium lost with your illness. Pedialyte  and sports drinks can help you replace these minerals. You can also drink clear liquids such as water, weak tea, apple juice, and 7-Up . Avoid acid liquids (orange), caffeine (coffee) or alcohol. Do not drink milk until you no longer have diarrhea.     After liquids are staying down, you may start eating mild foods. Soda crackers, toast, plain noodles, gelatin, applesauce and bananas are good first choices. Avoid foods that have acid, are spicy, fatty or have a lot of fiber (such as meats, coarse grains, vegetables). You may start eating these foods again in about 3 days when you are better.     Sometimes treatment includes prescription medicine to prevent nausea and vomiting. If your doctor prescribes these for you, take them as directed.     Don t take ibuprofen, or other nonsteroidal anti-inflammatory medicines without checking with your healthcare provider.   If you were given a prescription for medicine here today, be sure to read all of the information (including the package insert) that comes with your prescription.  This will include important information about the medicine, its side effects, and any warnings that you need to know about.  The pharmacist who fills the prescription can provide more information and answer questions you may have about the medicine.  If you have questions or  concerns that the pharmacist cannot address, please call or return to the Emergency Department.       Opioid Medication Information    Pain medications are among the most commonly prescribed medicines, so we are including this information for all our patients. If you did not receive pain medication or get a prescription for pain medicine, you can ignore it.     You may have been given a prescription for an opioid (narcotic) pain medicine and/or have received a pain medicine while here in the Emergency Department. These medicines can make you drowsy or impaired. You must not drive, operate dangerous equipment, or engage in any other dangerous activities while taking these medications. If you drive while taking these medications, you could be arrested for DUI, or driving under the influence. Do not drink any alcohol while you are taking these medications.     Opioid pain medications can cause addiction. If you have a history of chemical dependency of any type, you are at a higher risk of becoming addicted to pain medications.  Only take these prescribed medications to treat your pain when all other options have been tried. Take it for as short a time and as few doses as possible. Store your pain pills in a secure place, as they are frequently stolen and provide a dangerous opportunity for children or visitors in your house to start abusing these powerful medications. We will not replace any lost or stolen medicine.  As soon as your pain is better, you should flush all your remaining medication.     Many prescription pain medications contain Tylenol  (acetaminophen), including Vicodin , Tylenol #3 , Norco , Lortab , and Percocet .  You should not take any extra pills of Tylenol  if you are using these prescription medications or you can get very sick.  Do not ever take more than 3000 mg of acetaminophen in any 24 hour period.    All opioids tend to cause constipation. Drink plenty of water and eat foods that have a lot  of fiber, such as fruits, vegetables, prune juice, apple juice and high fiber cereal.  Take a laxative if you don t move your bowels at least every other day. Miralax , Milk of Magnesia, Colace , or Senna  can be used to keep you regular.      Remember that you can always come back to the Emergency Department if you are not able to see your regular doctor in the amount of time listed above, if you get any new symptoms, or if there is anything that worries you.          Future Appointments        Provider Department Dept Phone Center    6/16/2017 10:30 AM [facility] ULTRASOUND ROOM 1 [facility] [phone] [place]    6/16/2017 11:15 AM Karissa Wilkins MD [facility] [phone] [place]      24 Hour Appointment Hotline       To make an appointment at any [facility], call [phone] ([phone]). If you don't have a family doctor or clinic, we will help you find one. [facility] are conveniently located to serve the needs of you and your family.             Review of your medicines      Our records show that you are taking the medicines listed below. If these are incorrect, please call your family doctor or clinic.        Dose / Directions Last dose taken    escitalopram 20 MG tablet   Commonly known as:  LEXAPRO   Dose:  20 mg        Take 20 mg by mouth daily   Refills:  0        metoclopramide 10 MG tablet   Commonly known as:  REGLAN   Dose:  10 mg   Quantity:  120 tablet        Take 1 tablet (10 mg) by mouth 4 times daily as needed For nausea   Refills:  1        prenatal multivitamin  plus iron 27-0.8 MG Tabs per tablet   Dose:  1 tablet        Take 1 tablet by mouth daily   Refills:  0                Procedures and tests performed during your visit     Basic metabolic panel (BMP)    CBC + differential    UA with Microscopic    Urine Culture Aerobic Bacterial      Orders Needing Specimen Collection     None      Pending Results     Date and Time Order Name Status  Description    5/22/2017 0039 Urine Culture Aerobic Bacterial In process             Pending Culture Results     Date and Time Order Name Status Description    5/22/2017 0039 Urine Culture Aerobic Bacterial In process             Pending Results Instructions     If you had any lab results that were not finalized at the time of your Discharge, you can call the ED Lab Result RN at [phone]. You will be contacted by this team for any positive Lab results or changes in treatment. The nurses are available 7 days a week from 10A to 6:30P.  You can leave a message 24 hours per day and they will return your call.        Test Results From Your Hospital Stay        5/21/2017 10:57 PM      Component Results     Component Value Ref Range & Units Status    WBC 13.1 (H) 4.0 - 11.0 10e9/L Final    RBC Count 3.87 3.8 - 5.2 10e12/L Final    Hemoglobin 11.7 11.7 - 15.7 g/dL Final    Hematocrit 34.1 (L) 35.0 - 47.0 % Final    MCV 88 78 - 100 fl Final    MCH 30.2 26.5 - 33.0 pg Final    MCHC 34.3 31.5 - 36.5 g/dL Final    RDW 12.6 10.0 - 15.0 % Final    Platelet Count 321 150 - 450 10e9/L Final    Diff Method Automated Method  Final    % Neutrophils 72.2 % Final    % Lymphocytes 19.9 % Final    % Monocytes 5.0 % Final    % Eosinophils 2.2 % Final    % Basophils 0.2 % Final    % Immature Granulocytes 0.5 % Final    Nucleated RBCs 0 0 /100 Final    Absolute Neutrophil 9.5 (H) 1.6 - 8.3 10e9/L Final    Absolute Lymphocytes 2.6 0.8 - 5.3 10e9/L Final    Absolute Monocytes 0.7 0.0 - 1.3 10e9/L Final    Absolute Eosinophils 0.3 0.0 - 0.7 10e9/L Final    Absolute Basophils 0.0 0.0 - 0.2 10e9/L Final    Abs Immature Granulocytes 0.1 0 - 0.4 10e9/L Final    Absolute Nucleated RBC 0.0  Final         5/21/2017 11:12 PM      Component Results     Component Value Ref Range & Units Status    Sodium 137 133 - 144 mmol/L Final    Potassium 3.4 3.4 - 5.3 mmol/L Final    Chloride 104 94 - 109 mmol/L Final    Carbon Dioxide 26 20 - 32 mmol/L Final     Anion Gap 7 3 - 14 mmol/L Final    Glucose 94 70 - 99 mg/dL Final    Urea Nitrogen 6 (L) 7 - 30 mg/dL Final    Creatinine 0.52 0.52 - 1.04 mg/dL Final    GFR Estimate >90  Non  GFR Calc   >60 mL/min/1.7m2 Final    GFR Estimate If Black >90   GFR Calc   >60 mL/min/1.7m2 Final    Calcium 8.9 8.5 - 10.1 mg/dL Final         5/22/2017 12:21 AM      Component Results     Component Value Ref Range & Units Status    Color Urine Yellow  Final    Appearance Urine Slightly Cloudy  Final    Glucose Urine Negative NEG mg/dL Final    Bilirubin Urine Negative NEG Final    Ketones Urine 5 (A) NEG mg/dL Final    Specific Gravity Urine 1.016 1.003 - 1.035 Final    Blood Urine Negative NEG Final    pH Urine 7.0 5.0 - 7.0 pH Final    Protein Albumin Urine Negative NEG mg/dL Final    Urobilinogen mg/dL 0.0 0.0 - 2.0 mg/dL Final    Nitrite Urine Negative NEG Final    Leukocyte Esterase Urine Trace (A) NEG Final    Source Clean catch urine  Final    WBC Urine 2 0 - 2 /HPF Final    RBC Urine 1 0 - 2 /HPF Final    Bacteria Urine Few (A) NEG /HPF Final    Squamous Epithelial /HPF Urine 2 (H) 0 - 1 /HPF Final    Mucous Urine Present (A) NEG /LPF Final    Amorphous Crystals Few (A) NEG /HPF Final         5/22/2017 12:42 AM                Clinical Quality Measure: Blood Pressure Screening     Your blood pressure was checked while you were in the emergency department today. The last reading we obtained was  BP: 146/89 . Please read the guidelines below about what these numbers mean and what you should do about them.  If your systolic blood pressure (the top number) is less than 120 and your diastolic blood pressure (the bottom number) is less than 80, then your blood pressure is normal. There is nothing more that you need to do about it.  If your systolic blood pressure (the top number) is 120-139 or your diastolic blood pressure (the bottom number) is 80-89, your blood pressure may be higher than it should be.  "You should have your blood pressure rechecked within a year by a primary care provider.  If your systolic blood pressure (the top number) is 140 or greater or your diastolic blood pressure (the bottom number) is 90 or greater, you may have high blood pressure. High blood pressure is treatable, but if left untreated over time it can put you at risk for heart attack, stroke, or kidney failure. You should have your blood pressure rechecked by a primary care provider within the next 4 weeks.  If your provider in the emergency department today gave you specific instructions to follow-up with your doctor or provider even sooner than that, you should follow that instruction and not wait for up to 4 weeks for your follow-up visit.        Thank you for choosing [facility]       Thank you for choosing [facility] for your care. Our goal is always to provide you with excellent care. Hearing back from our patients is one way we can continue to improve our services. Please take a few minutes to complete the written survey that you may receive in the mail after you visit with us. Thank you!        [employer] Information     [employer] lets you send messages to your doctor, view your test results, renew your prescriptions, schedule appointments and more. To sign up, go to [email or web address] . Click on \"Log in\" on the left side of the screen, which will take you to the Welcome page. Then click on \"Sign up Now\" on the right side of the page.     You will be asked to enter the access code listed below, as well as some personal information. Please follow the directions to create your username and password.     Your access code is: [email or web address]  Expires: 2017 11:26 AM     Your access code will  in 90 days. If you need help or a new code, please call your [facility] clinic or [phone].        Care EveryWhere ID     This is your Care EveryWhere ID. This could be used by other organizations to access your [facility] medical " records  [id number]        After Visit Summary       This is your record. Keep this with you and show to your community pharmacist(s) and doctor(s) at your next visit.

## 2017-05-21 NOTE — ED AVS SNAPSHOT
[facility] Emergency Department    [address]    [facility] [address]    Phone:  [phone]    Fax:  [phone]                                       Paulo Lema   MRN: [id number]    Department:  [facility] Emergency Department   Date of Visit:  5/21/2017           After Visit Summary Signature Page     I have received my discharge instructions, and my questions have been answered. I have discussed any challenges I see with this plan with the nurse or doctor.    ..........................................................................................................................................  Patient/Patient Representative Signature      ..........................................................................................................................................  Patient Representative Print Name and Relationship to Patient    ..................................................               ................................................  Date                                            Time    ..........................................................................................................................................  Reviewed by Signature/Title    ...................................................              ..............................................  Date                                                            Time

## 2017-05-22 VITALS
RESPIRATION RATE: 18 BRPM | OXYGEN SATURATION: 99 % | HEART RATE: 104 BPM | SYSTOLIC BLOOD PRESSURE: 124 MMHG | TEMPERATURE: 98.7 F | DIASTOLIC BLOOD PRESSURE: 75 MMHG

## 2017-05-22 LAB
ALBUMIN UR-MCNC: NEGATIVE MG/DL
AMORPH CRY #/AREA URNS HPF: ABNORMAL /HPF
APPEARANCE UR: ABNORMAL
BACTERIA #/AREA URNS HPF: ABNORMAL /HPF
BILIRUB UR QL STRIP: NEGATIVE
COLOR UR AUTO: YELLOW
GLUCOSE UR STRIP-MCNC: NEGATIVE MG/DL
HGB UR QL STRIP: NEGATIVE
KETONES UR STRIP-MCNC: 5 MG/DL
LEUKOCYTE ESTERASE UR QL STRIP: ABNORMAL
MUCOUS THREADS #/AREA URNS LPF: PRESENT /LPF
NITRATE UR QL: NEGATIVE
PH UR STRIP: 7 PH (ref 5–7)
RBC #/AREA URNS AUTO: 1 /HPF (ref 0–2)
SP GR UR STRIP: 1.02 (ref 1–1.03)
SQUAMOUS #/AREA URNS AUTO: 2 /HPF (ref 0–1)
URN SPEC COLLECT METH UR: ABNORMAL
UROBILINOGEN UR STRIP-MCNC: 0 MG/DL (ref 0–2)
WBC #/AREA URNS AUTO: 2 /HPF (ref 0–2)

## 2017-05-22 PROCEDURE — 81001 URINALYSIS AUTO W/SCOPE: CPT | Performed by: EMERGENCY MEDICINE

## 2017-05-22 PROCEDURE — 87086 URINE CULTURE/COLONY COUNT: CPT | Performed by: EMERGENCY MEDICINE

## 2017-05-22 ASSESSMENT — ENCOUNTER SYMPTOMS
VOMITING: 1
ABDOMINAL PAIN: 0
FEVER: 0
DIARRHEA: 0
DYSURIA: 0

## 2017-05-22 NOTE — DISCHARGE INSTRUCTIONS
Please follow up closely with your Ob-gyn. Please return to the ED if your symptoms worsen or if you develop new or concerning symptoms.       Discharge Instructions  Vomiting    You have been seen today for vomiting. This is usually caused by a virus, but some bacteria, parasites, medicines or other medical conditions can cause similar symptoms. At this time your doctor does not find that your vomiting is a sign of anything dangerous or life-threatening. However, sometimes the signs of serious illness do not show up right away. If you have new or worse symptoms, you may need to be seen again in the emergency department or by your primary doctor. Remember that serious problems like appendicitis can start as vomiting.     Return to the Emergency Department if:    You keep throwing up and you are not able to keep liquids down.     You feel you are getting dehydrated, such as being very thirsty, not urinating at least every 8-12 hours, or feeling faint or lightheaded.     You develop a new fever, or your fever continues for more than 2 days.     You have belly pain that seems worse than cramps, is in one spot, or is getting worse over time.     You have blood in your vomit or stools.     You feel very weak.    You are not starting to improve within 24 hours of your visit here.     What can I do to help myself?    The most important thing to do is to drink clear liquids. If you have been vomiting a lot, it is best to have only small, frequent sips of liquids. Drinking too much at once may cause more vomiting. If you are vomiting often, you must replace minerals, sodium and potassium lost with your illness. Pedialyte  and sports drinks can help you replace these minerals. You can also drink clear liquids such as water, weak tea, apple juice, and 7-Up . Avoid acid liquids (orange), caffeine (coffee) or alcohol. Do not drink milk until you no longer have diarrhea.     After liquids are staying down, you may start eating  mild foods. Soda crackers, toast, plain noodles, gelatin, applesauce and bananas are good first choices. Avoid foods that have acid, are spicy, fatty or have a lot of fiber (such as meats, coarse grains, vegetables). You may start eating these foods again in about 3 days when you are better.     Sometimes treatment includes prescription medicine to prevent nausea and vomiting. If your doctor prescribes these for you, take them as directed.     Don t take ibuprofen, or other nonsteroidal anti-inflammatory medicines without checking with your healthcare provider.   If you were given a prescription for medicine here today, be sure to read all of the information (including the package insert) that comes with your prescription.  This will include important information about the medicine, its side effects, and any warnings that you need to know about.  The pharmacist who fills the prescription can provide more information and answer questions you may have about the medicine.  If you have questions or concerns that the pharmacist cannot address, please call or return to the Emergency Department.       Opioid Medication Information    Pain medications are among the most commonly prescribed medicines, so we are including this information for all our patients. If you did not receive pain medication or get a prescription for pain medicine, you can ignore it.     You may have been given a prescription for an opioid (narcotic) pain medicine and/or have received a pain medicine while here in the Emergency Department. These medicines can make you drowsy or impaired. You must not drive, operate dangerous equipment, or engage in any other dangerous activities while taking these medications. If you drive while taking these medications, you could be arrested for DUI, or driving under the influence. Do not drink any alcohol while you are taking these medications.     Opioid pain medications can cause addiction. If you have a history of  chemical dependency of any type, you are at a higher risk of becoming addicted to pain medications.  Only take these prescribed medications to treat your pain when all other options have been tried. Take it for as short a time and as few doses as possible. Store your pain pills in a secure place, as they are frequently stolen and provide a dangerous opportunity for children or visitors in your house to start abusing these powerful medications. We will not replace any lost or stolen medicine.  As soon as your pain is better, you should flush all your remaining medication.     Many prescription pain medications contain Tylenol  (acetaminophen), including Vicodin , Tylenol #3 , Norco , Lortab , and Percocet .  You should not take any extra pills of Tylenol  if you are using these prescription medications or you can get very sick.  Do not ever take more than 3000 mg of acetaminophen in any 24 hour period.    All opioids tend to cause constipation. Drink plenty of water and eat foods that have a lot of fiber, such as fruits, vegetables, prune juice, apple juice and high fiber cereal.  Take a laxative if you don t move your bowels at least every other day. Miralax , Milk of Magnesia, Colace , or Senna  can be used to keep you regular.      Remember that you can always come back to the Emergency Department if you are not able to see your regular doctor in the amount of time listed above, if you get any new symptoms, or if there is anything that worries you.

## 2017-05-22 NOTE — ED PROVIDER NOTES
History     Chief Complaint:  Vomiting    HPI   Paulo Lema is a 26 year old  approximately 14 week pregnant female who presents to the emergency department today for evaluation of hyperemesis. The patient has been having problems with nausea and prescribed Reglan by OB/GYN. She notes that the reglan was working well until today.She reporst multiple episodes of nausea/vomiting with no improvement with Reglan prompting visit today. The patient has been unable to tolerate fluids and PO intake today. She notes that she is unsure if she was able to even keep down the reglan as she would throw up after taking it today. No fevers. No abdominal pain or diarrhea. No dysuria or vaginal bleeding. No other concerns or complaints at this time.     Allergies:  NKDA     Medications:    Lexapro  Reglan  Pre- vitamins     Past Medical History:    History reviewed. No pertinent medical history.   Past Surgical History:    Surgical history reviewed. No pertinent surgical history.    Family History:    History reviewed. No pertinent family history.    Social History:  Marital Status:   [2]  Tobacco: Negative  Alcohol: Negative  Presents with spouse.   PCP: Daksha Mack  OB/GYN: Sherin Wilkins    Review of Systems   Constitutional: Negative for fever.   Gastrointestinal: Positive for vomiting. Negative for abdominal pain and diarrhea.   Genitourinary: Negative for dysuria and vaginal bleeding.   All other systems reviewed and are negative.    Physical Exam   First Vitals:  BP: 130/88  Pulse: 104  Heart Rate: 104  Temp: 98.7  F (37.1  C)  Resp: 18  SpO2: 99 %    Physical Exam  Constitutional: The patient is oriented to person, place, and time. Alert and cooperative.  HENT:   Right Ear: External ear normal.   Left Ear: External ear normal.   Nose: Nose normal.   Mouth/Throat: Uvula is midline, oropharynx is clear and moist and mucous membranes are normal. No posterior oropharyngeal edema or erythema.   Eyes:  Conjunctivae, EOM and lids are normal. Pupils are equal, round, and reactive to light.   Neck: Trachea normal. Normal range of motion. Neck supple.   Cardiovascular: tachycardia, regular rhythm, normal heart sounds, and intact distal pulses.    Pulmonary/Chest: Effort normal and breath sounds equal bilaterally. No crackles or wheezing.   Abdominal: Soft. No tenderness. No rebound and no guarding.   Musculoskeletal: Normal range of motion.  No extremity tenderness or edema.  Neurological: Alert and Oriented. Strength 5/5 in upper and lower extremities bilaterally. Sensation intact to light touch throughout.  Skin: Skin is dry. No rash noted.          Emergency Department Course   Laboratory:  CBC:  WBC 13.1, HGB 11.7,   BMP: BUN 6 o/w WNL. (Creatinine 0.52)    UA: Bacteria few (A), Leukocyte trace (A), Ketone 5 (A), Mucous present (A), Amorphous crystals few (A). WBC/HPF 2, RBC/HPF 1.     Interventions:  22:57 Reglan 25 mg Injection  22:57 Benadryl 25 mg Injection  23:12 Normal saline 1,000mL IV     Emergency Department Course:  Nursing notes and vitals reviewed.    22:45 I performed an exam of the patient as documented above.     22:11 Recheck patient. I noted the patient has some redness on her arm.     The patient received the intervention(s) above.    00:03 Recheck patient. Findings and plan explained to the Patient and spouse. Patient discharged home with instructions regarding supportive care, medications, and reasons to return. The importance of close follow-up was reviewed.  Impression & Plan    Medical Decision Making:  Paulo Lema is a 26 year old  female approximately 14 weeks gestation who presents to the emergency department for evaluation of nausea and vomiting. The patient also notes she has had nausea and vomiting during pregnancy, however today it was worse and not controlled with her oral Reglan at home therefore this is why she presents to the ED. Upon presentation to the ED, the  patient is nontoxic appearing. She mild tachycardic, but vital signs are otherwise within normal limits and stable.  On exam, she is well appearing. She is alert, oriented, and neurologic exam is nonfocal. Cardiopulmonary exam is unremarkable. Abdomen is soft and nontender throughout. The rest of her exam is as mentioned above. She was given IV fluids, Reglan, and Benadryl. Labs were obtained and are as mentioned above. The patient has no abdominal tenderness on exam to suggest an acute surgical abdomen or warrant imaging of her abdomen at this time. The patient denies any vaginal complaints or abdominal pain, therefore I do not feel that formal OB ultrasound is indicated this time. Upon my repeat evaluation, the patient does note significant improvement in her symptoms following the above mentioned interventions. She was given a of trial of PO and tolerated this well without emesis. Overall, given that she is well-appearing and tolerating PO, I do feel she can be discharged home. I did discuss with the patient and her significant other that I recommend close follow up with her OB/GYN, and they note understanding and agreement with this plan. Return instructions were given. She was stable/improved at the time of discharge.      Diagnosis:    ICD-10-CM    1. Nausea and vomiting in pregnancy O21.9 Urine Culture Aerobic Bacterial       Disposition:  discharged to home    Discharge Medications:  Discharge Medication List as of 5/22/2017 12:51 AM          Scribe Disclosure:  IChanelle, am serving as a scribe at 10:45 PM on 5/21/2017 to document services personally performed by Yana Montesinos MD, based on my observations and the provider's statements to me.  Chanelle Badillo  5/21/2017   [facility] EMERGENCY DEPARTMENT       Yana Montesinos MD  05/23/17 1530

## 2017-05-22 NOTE — ED NOTES
14 weeks gestation has been on reglan, which worked until today, attempted 3 times today with continued vomiting

## 2017-05-23 LAB
BACTERIA SPEC CULT: NORMAL
Lab: NORMAL
MICRO REPORT STATUS: NORMAL
SPECIMEN SOURCE: NORMAL

## 2017-05-23 NOTE — TELEPHONE ENCOUNTER
Pt has next pn appt on 6/16/17.     I attached a note on that appt to offer quad screen.     BRISA Sanches RN

## 2017-06-16 ENCOUNTER — RADIANT APPOINTMENT (OUTPATIENT)
Dept: ULTRASOUND IMAGING | Facility: CLINIC | Age: 27
End: 2017-06-16
Attending: OBSTETRICS & GYNECOLOGY
Payer: COMMERCIAL

## 2017-06-16 ENCOUNTER — PRENATAL OFFICE VISIT (OUTPATIENT)
Dept: OBGYN | Facility: CLINIC | Age: 27
End: 2017-06-16
Payer: COMMERCIAL

## 2017-06-16 VITALS
SYSTOLIC BLOOD PRESSURE: 102 MMHG | BODY MASS INDEX: 31.45 KG/M2 | HEIGHT: 63 IN | WEIGHT: 177.5 LBS | TEMPERATURE: 98 F | DIASTOLIC BLOOD PRESSURE: 68 MMHG

## 2017-06-16 DIAGNOSIS — Z34.00 SUPERVISION OF NORMAL FIRST PREGNANCY, ANTEPARTUM: ICD-10-CM

## 2017-06-16 DIAGNOSIS — Z34.00 SUPERVISION OF NORMAL FIRST PREGNANCY, ANTEPARTUM: Primary | ICD-10-CM

## 2017-06-16 PROCEDURE — 99207 ZZC PRENATAL VISIT: CPT | Performed by: OBSTETRICS & GYNECOLOGY

## 2017-06-16 PROCEDURE — 76805 OB US >/= 14 WKS SNGL FETUS: CPT | Performed by: OBSTETRICS & GYNECOLOGY

## 2017-06-16 NOTE — MR AVS SNAPSHOT
After Visit Summary   6/16/2017    Paulo Lema    MRN: [id number]           Patient Information     Date Of Birth          1990        Visit Information        Provider Department      6/16/2017 11:15 AM Karissa Wilkins MD [facility]        Today's Diagnoses     Supervision of normal first pregnancy, antepartum          Care Instructions    Check out parenting classes at [facility]    Schedule a hospital tour      Call the clinic ([facility] [phone], [facility] [phone]) right away with any of the following concerns:    1.   Regular tightening or contractions every 10 minutes, that do not go away with rest and hydration    2.   Vaginal bleeding.      Continue your prenatal vitamins daily.    Avoid lying flat on your back for a prolonged period.    Consume 2-3 servings of fish or seafood weekly for fetal brain development. Avoid shark, tilefish, swordfish, and albacore tune as these contain very abilio levels of mercury. No raw seafood in pregnancy.    Be sure you are consuming 1000mg of calcium and 1000 IU of vitamin D daily.    Unless instructed otherwise, try to fit in 30 minutes of moderate exercise daily.    Please call with any additional concerns that your have, and continue your prenatal visits every four weeks!           Follow-ups after your visit        Who to contact     If you have questions or need follow up information about today's clinic visit or your schedule please contact [facility] directly at [phone].  Normal or non-critical lab and imaging results will be communicated to you by MyChart, letter or phone within 4 business days after the clinic has received the results. If you do not hear from us within 7 days, please contact the clinic through MyChart or phone. If you have a critical or abnormal lab result, we will notify you by phone as soon as possible.  Submit refill requests through [employer] or call your pharmacy and they will  "forward the refill request to us. Please allow 3 business days for your refill to be completed.          Additional Information About Your Visit        [employer] Information     [employer] lets you send messages to your doctor, view your test results, renew your prescriptions, schedule appointments and more. To sign up, go to [email or web address] . Click on \"Log in\" on the left side of the screen, which will take you to the Welcome page. Then click on \"Sign up Now\" on the right side of the page.     You will be asked to enter the access code listed below, as well as some personal information. Please follow the directions to create your username and password.     Your access code is: [email or web address]  Expires: 2017 11:26 AM     Your access code will  in 90 days. If you need help or a new code, please call your [place] clinic or [phone].        Care EveryWhere ID     This is your Care EveryWhere ID. This could be used by other organizations to access your [place] medical records  [id number]        Your Vitals Were     Temperature Height Last Period Breastfeeding? BMI (Body Mass Index)       98  F (36.7  C) (Oral) 5' 3\" (1.6 m) 02/10/2017 (Exact Date) No 31.44 kg/m2        Blood Pressure from Last 3 Encounters:   17 102/68   17 124/75   17 118/60    Weight from Last 3 Encounters:   17 177 lb 8 oz (80.5 kg)   17 175 lb 4.8 oz (79.5 kg)   17 171 lb 12.8 oz (77.9 kg)              Today, you had the following     No orders found for display       Primary Care Provider    Physician No Ref-Primary       No address on file        Thank you!     Thank you for choosing [facility]  for your care. Our goal is always to provide you with excellent care. Hearing back from our patients is one way we can continue to improve our services. Please take a few minutes to complete the written survey that you may receive in the mail after your visit with us. Thank you!      "        Your Updated Medication List - Protect others around you: Learn how to safely use, store and throw away your medicines at www.disposemymeds.org.          This list is accurate as of: 6/16/17 11:35 AM.  Always use your most recent med list.                   Brand Name Dispense Instructions for use    escitalopram 20 MG tablet    LEXAPRO     Take 20 mg by mouth daily       metoclopramide 10 MG tablet    REGLAN    120 tablet    Take 1 tablet (10 mg) by mouth 4 times daily as needed For nausea       prenatal multivitamin  plus iron 27-0.8 MG Tabs per tablet      Take 1 tablet by mouth daily

## 2017-06-16 NOTE — PROGRESS NOTES
"Routine OB Visit:    S: Feeling much better, no further nausea, not requiring reglan. Still taking B6, will try to wean. No bleeding or cramping. No movement yet.    O: /68  Temp 98  F (36.7  C) (Oral)  Ht 5' 3\" (1.6 m)  Wt 177 lb 8 oz (80.5 kg)  LMP 02/10/2017 (Exact Date)  Breastfeeding? No  BMI 31.44 kg/m2   Gen: resting comfortably, in NAD  See Prenatal flowsheet    A: Paulo Lema is a 26 year old female  at 17w6d here for routine OB care.    P:   1)A+/RI. Anatomy scan ordered today. Desired 1st trimester screen but did not acquire in time, declining quad screen/AFP. Anatomy scan within normal limits today. Sex will be a surprise.   2) Nausea: significantly improved.   3) Discussed prenatal classes and hospital tour as well as childcare.   4) return to clinic in 4 weeks      Karissa Wilkins MD  Obstetrics and Gynecology     "

## 2017-06-16 NOTE — PATIENT INSTRUCTIONS
Check out parenting classes at [facility]    Schedule a hospital tour      Call the clinic ([facility] [phone], [facility] [phone]) right away with any of the following concerns:    1.   Regular tightening or contractions every 10 minutes, that do not go away with rest and hydration    2.   Vaginal bleeding.      Continue your prenatal vitamins daily.    Avoid lying flat on your back for a prolonged period.    Consume 2-3 servings of fish or seafood weekly for fetal brain development. Avoid shark, tilefish, swordfish, and albacore tune as these contain very abilio levels of mercury. No raw seafood in pregnancy.    Be sure you are consuming 1000mg of calcium and 1000 IU of vitamin D daily.    Unless instructed otherwise, try to fit in 30 minutes of moderate exercise daily.    Please call with any additional concerns that your have, and continue your prenatal visits every four weeks!

## 2017-06-16 NOTE — NURSING NOTE
"Chief Complaint   Patient presents with     Prenatal Care     17week 6days       Initial /68  Temp 98  F (36.7  C) (Oral)  Ht 5' 3\" (1.6 m)  Wt 177 lb 8 oz (80.5 kg)  LMP 02/10/2017 (Exact Date)  Breastfeeding? No  BMI 31.44 kg/m2 Estimated body mass index is 31.44 kg/(m^2) as calculated from the following:    Height as of this encounter: 5' 3\" (1.6 m).    Weight as of this encounter: 177 lb 8 oz (80.5 kg).  Medication Reconciliation: complete   Kareem Eli MA      "

## 2017-07-14 ENCOUNTER — PRENATAL OFFICE VISIT (OUTPATIENT)
Dept: OBGYN | Facility: CLINIC | Age: 27
End: 2017-07-14
Payer: COMMERCIAL

## 2017-07-14 VITALS
WEIGHT: 181.1 LBS | HEART RATE: 108 BPM | HEIGHT: 63 IN | BODY MASS INDEX: 32.09 KG/M2 | DIASTOLIC BLOOD PRESSURE: 60 MMHG | SYSTOLIC BLOOD PRESSURE: 116 MMHG

## 2017-07-14 DIAGNOSIS — Z34.00 SUPERVISION OF NORMAL FIRST PREGNANCY, ANTEPARTUM: Primary | ICD-10-CM

## 2017-07-14 PROCEDURE — 99207 ZZC PRENATAL VISIT: CPT | Performed by: OBSTETRICS & GYNECOLOGY

## 2017-07-14 NOTE — MR AVS SNAPSHOT
"              After Visit Summary   7/14/2017    Paulo Lema    MRN: [id number]           Patient Information     Date Of Birth          1990        Visit Information        Provider Department      7/14/2017 9:45 AM Karissa Wilkins MD [facility]        Today's Diagnoses     Supervision of normal first pregnancy, antepartum    -  1       Follow-ups after your visit        Your next 10 appointments already scheduled     Aug 10, 2017 10:00 AM CDT   ESTABLISHED PRENATAL with Karissa Wilkins MD   [facility] ([facility]    [address]  [facility] [id number] [phone]            Sep 06, 2017  4:15 PM CDT   ESTABLISHED PRENATAL with Karissa Wilkins MD   [facility] ([facility])    [address]  [facility] [id number] [phone]              Who to contact     If you have questions or need follow up information about today's clinic visit or your schedule please contact [facility] directly at [phone].  Normal or non-critical lab and imaging results will be communicated to you by MyChart, letter or phone within 4 business days after the clinic has received the results. If you do not hear from us within 7 days, please contact the clinic through [employer] or phone. If you have a critical or abnormal lab result, we will notify you by phone as soon as possible.  Submit refill requests through [employer] or call your pharmacy and they will forward the refill request to us. Please allow 3 business days for your refill to be completed.          Additional Information About Your Visit        [employer] Information     [employer] lets you send messages to your doctor, view your test results, renew your prescriptions, schedule appointments and more. To sign up, go to [email or web address] . Click on \"Log in\" on the left side of the screen, which will take you to the Welcome page. Then click on " "\"Sign up Now\" on the right side of the page.     You will be asked to enter the access code listed below, as well as some personal information. Please follow the directions to create your username and password.     Your access code is: [email or web address]  Expires: 10/12/2017 10:27 AM     Your access code will  in 90 days. If you need help or a new code, please call your [place] clinic or [phone].        Care EveryWhere ID     This is your Care EveryWhere ID. This could be used by other organizations to access your [place] medical records  [id number]        Your Vitals Were     Pulse Height Last Period BMI (Body Mass Index)          108 5' 3\" (1.6 m) 02/10/2017 (Exact Date) 32.08 kg/m2         Blood Pressure from Last 3 Encounters:   17 116/60   17 102/68   17 124/75    Weight from Last 3 Encounters:   17 181 lb 1.6 oz (82.1 kg)   17 177 lb 8 oz (80.5 kg)   17 175 lb 4.8 oz (79.5 kg)              Today, you had the following     No orders found for display       Primary Care Provider    Physician No Ref-Primary       No address on file        Equal Access to Services     CARROL RODRIGUEZ : Hadii aad ku hadasho Soomaali, waaxda luqadaha, qaybta kaalmada adeegyada, waxay mari gresham . So [facility] [phone].    ATENCIÓN: Si habla español, tiene a golden disposición servicios gratuitos de asistencia lingüística. Llame al [phone].    We comply with applicable federal civil rights laws and [place] laws. We do not discriminate on the basis of race, color, national origin, age, disability sex, sexual orientation or gender identity.            Thank you!     Thank you for choosing [facility]  for your care. Our goal is always to provide you with excellent care. Hearing back from our patients is one way we can continue to improve our services. Please take a few minutes to complete the written survey that you may receive in the mail after your " visit with us. Thank you!             Your Updated Medication List - Protect others around you: Learn how to safely use, store and throw away your medicines at www.disposemymeds.org.          This list is accurate as of: 7/14/17 10:27 AM.  Always use your most recent med list.                   Brand Name Dispense Instructions for use Diagnosis    escitalopram 20 MG tablet    LEXAPRO     Take 20 mg by mouth daily        metoclopramide 10 MG tablet    REGLAN    120 tablet    Take 1 tablet (10 mg) by mouth 4 times daily as needed For nausea    Nausea/vomiting in pregnancy       prenatal multivitamin  plus iron 27-0.8 MG Tabs per tablet      Take 1 tablet by mouth daily

## 2017-07-14 NOTE — PROGRESS NOTES
"Routine OB Visit:    S: Feeling much better, minimal nausea, emesis once weekly. No bleeding or cramping. No movement yet. Unable to find childcare options in her area. 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    O: /60  Pulse 108  Ht 5' 3\" (1.6 m)  Wt 181 lb 1.6 oz (82.1 kg)  LMP 02/10/2017 (Exact Date)  BMI 32.08 kg/m2   Gen: resting comfortably, in NAD  See Prenatal flowsheet    A: Paulo Lema is a 26 year old female  at 21w6d here for routine OB care.    P:   1)A+/RI. Anatomy scan normal, posterior placenta. Declined genetic testing/AFP. Sex will be a surprise. CBC/GCT next visit  2) Nausea: significantly improved.   3) Discussed prenatal classes and hospital tour as well as childcare. Will provider her with an option for [facility] service contact info  4) return to clinic in 4 weeks      Karissa Wilkins MD  Obstetrics and Gynecology     "

## 2017-08-10 ENCOUNTER — PRENATAL OFFICE VISIT (OUTPATIENT)
Dept: OBGYN | Facility: CLINIC | Age: 27
End: 2017-08-10
Payer: COMMERCIAL

## 2017-08-10 VITALS
DIASTOLIC BLOOD PRESSURE: 60 MMHG | WEIGHT: 194 LBS | HEART RATE: 84 BPM | SYSTOLIC BLOOD PRESSURE: 124 MMHG | BODY MASS INDEX: 34.37 KG/M2 | RESPIRATION RATE: 18 BRPM

## 2017-08-10 DIAGNOSIS — Z34.00 SUPERVISION OF NORMAL FIRST PREGNANCY, ANTEPARTUM: ICD-10-CM

## 2017-08-10 LAB
ERYTHROCYTE [DISTWIDTH] IN BLOOD BY AUTOMATED COUNT: 12.9 % (ref 10–15)
GLUCOSE 1H P 50 G GLC PO SERPL-MCNC: 152 MG/DL (ref 60–129)
HCT VFR BLD AUTO: 30.5 % (ref 35–47)
HGB BLD-MCNC: 10 G/DL (ref 11.7–15.7)
MCH RBC QN AUTO: 30.8 PG (ref 26.5–33)
MCHC RBC AUTO-ENTMCNC: 32.8 G/DL (ref 31.5–36.5)
MCV RBC AUTO: 94 FL (ref 78–100)
PLATELET # BLD AUTO: 313 10E9/L (ref 150–450)
RBC # BLD AUTO: 3.25 10E12/L (ref 3.8–5.2)
WBC # BLD AUTO: 11.3 10E9/L (ref 4–11)

## 2017-08-10 PROCEDURE — 99207 ZZC PRENATAL VISIT: CPT | Performed by: OBSTETRICS & GYNECOLOGY

## 2017-08-10 PROCEDURE — 82950 GLUCOSE TEST: CPT | Performed by: OBSTETRICS & GYNECOLOGY

## 2017-08-10 PROCEDURE — 36415 COLL VENOUS BLD VENIPUNCTURE: CPT | Performed by: OBSTETRICS & GYNECOLOGY

## 2017-08-10 PROCEDURE — 85027 COMPLETE CBC AUTOMATED: CPT | Performed by: OBSTETRICS & GYNECOLOGY

## 2017-08-10 NOTE — PATIENT INSTRUCTIONS
Jesse from the [place]      Call the clinic ([facility] [phone], [facility] [phone]) right away with any of the following concerns:    1.   Regular tightening, cramping or contractions every 10 minutes, that do not go away with rest and hydration    2.   Vaginal bleeding.    3.   Leaking fluid of any amount, or suspicion of ruptured membranes    4.   Decreased movement of your baby.      Continue your prenatal vitamins daily.    Avoid lying flat on your back for a prolonged period.    Consume 2-3 servings of fish or seafood weekly for fetal brain development. Avoid shark, tilefish, swordfish, and albacore tune as these contain very abilio levels of mercury. No raw seafood in pregnancy.    Be sure you are consuming 1000mg of calcium and 1000 IU of vitamin D daily.    Unless instructed otherwise, try to fit in 30 minutes of moderate exercise daily.    Please call with any additional concerns that your have, and continue your prenatal visits every two weeks!

## 2017-08-10 NOTE — MR AVS SNAPSHOT
After Visit Summary   8/10/2017    Paulo Lema    MRN: [id number]           Patient Information     Date Of Birth          1990        Visit Information        Provider Department      8/10/2017 10:00 AM Karissa Wilkins MD [facility]        Today's Diagnoses     Supervision of normal first pregnancy, antepartum          Care Instructions    Nannies from the [place]      Call the clinic ([facility] [phone], [facility] [phone]) right away with any of the following concerns:    1.   Regular tightening, cramping or contractions every 10 minutes, that do not go away with rest and hydration    2.   Vaginal bleeding.    3.   Leaking fluid of any amount, or suspicion of ruptured membranes    4.   Decreased movement of your baby.      Continue your prenatal vitamins daily.    Avoid lying flat on your back for a prolonged period.    Consume 2-3 servings of fish or seafood weekly for fetal brain development. Avoid shark, tilefish, swordfish, and albacore tune as these contain very abilio levels of mercury. No raw seafood in pregnancy.    Be sure you are consuming 1000mg of calcium and 1000 IU of vitamin D daily.    Unless instructed otherwise, try to fit in 30 minutes of moderate exercise daily.    Please call with any additional concerns that your have, and continue your prenatal visits every two weeks!           Follow-ups after your visit        Your next 10 appointments already scheduled     Sep 06, 2017  4:00 PM CDT   ESTABLISHED PRENATAL with Karissa Wilkins MD   [facility] ([facility]    [address]  [facility] [id number] [phone]              Who to contact     If you have questions or need follow up information about today's clinic visit or your schedule please contact [facility] directly at [phone].  Normal or non-critical lab and imaging results will be communicated to you by MyChart, letter or  "phone within 4 business days after the clinic has received the results. If you do not hear from us within 7 days, please contact the clinic through [employer] or phone. If you have a critical or abnormal lab result, we will notify you by phone as soon as possible.  Submit refill requests through [employer] or call your pharmacy and they will forward the refill request to us. Please allow 3 business days for your refill to be completed.          Additional Information About Your Visit        [employer] Information     [employer] lets you send messages to your doctor, view your test results, renew your prescriptions, schedule appointments and more. To sign up, go to [email or web address] . Click on \"Log in\" on the left side of the screen, which will take you to the Welcome page. Then click on \"Sign up Now\" on the right side of the page.     You will be asked to enter the access code listed below, as well as some personal information. Please follow the directions to create your username and password.     Your access code is: [email or web address]  Expires: 10/12/2017 10:27 AM     Your access code will  in 90 days. If you need help or a new code, please call your [place] clinic or [phone].        Care EveryWhere ID     This is your Care EveryWhere ID. This could be used by other organizations to access your [place] medical records  [id number]        Your Vitals Were     Pulse Respirations Last Period BMI (Body Mass Index)          84 18 02/10/2017 (Exact Date) 34.37 kg/m2         Blood Pressure from Last 3 Encounters:   08/10/17 124/60   17 116/60   17 102/68    Weight from Last 3 Encounters:   08/10/17 194 lb (88 kg)   17 181 lb 1.6 oz (82.1 kg)   17 177 lb 8 oz (80.5 kg)              We Performed the Following     CBC with platelets     Glucose tolerance gest screen 1 hour        Primary Care Provider    Physician No Ref-Primary       No address on file        Equal Access to Services     [facility] " GAAR : Hadii aad ku hadfabian Martínez, wamarada luqadaha, qaybta kagiovanny juliethsilviashahram, waxeliceo mari jomaximkalin soriano. So [facility] [phone].    ATENCIÓN: Si habla español, tiene a golden disposición servicios gratuitos de asistencia lingüística. Llame al [phone].    We comply with applicable federal civil rights laws and [place] laws. We do not discriminate on the basis of race, color, national origin, age, disability sex, sexual orientation or gender identity.            Thank you!     Thank you for choosing [facility]  for your care. Our goal is always to provide you with excellent care. Hearing back from our patients is one way we can continue to improve our services. Please take a few minutes to complete the written survey that you may receive in the mail after your visit with us. Thank you!             Your Updated Medication List - Protect others around you: Learn how to safely use, store and throw away your medicines at www.disposemymeds.org.          This list is accurate as of: 8/10/17 10:40 AM.  Always use your most recent med list.                   Brand Name Dispense Instructions for use Diagnosis    prenatal multivitamin plus iron 27-0.8 MG Tabs per tablet      Take 1 tablet by mouth daily

## 2017-08-10 NOTE — NURSING NOTE
"Chief Complaint   Patient presents with     Prenatal Care     25 weeks 5 days     Back Pain     off and on for the past month. Patient reports the pain is in one spot on back- mid right side back. No c/o accident or trauma     Cough     since last Friday, wants to know what medication she can take. No c/o fever or other sx's.     Leg Pain     left leg pain, for the past 2 weeks-- pain is off and on.       Initial /60 (BP Location: Left arm, Patient Position: Chair, Cuff Size: Adult Large)  Pulse 84  Resp 18  Wt 194 lb (88 kg)  LMP 02/10/2017 (Exact Date)  BMI 34.37 kg/m2 Estimated body mass index is 34.37 kg/(m^2) as calculated from the following:    Height as of 7/14/17: 5' 3\" (1.6 m).    Weight as of this encounter: 194 lb (88 kg).  Medication Reconciliation: complete     Maureen Siegel CMA      "

## 2017-08-10 NOTE — PROGRESS NOTES
Routine OB Visit:    S: Good appetite now, worried about large interval weight gain of 13lb in 4 weeks. No bleeding or cramping. +movement. Right posterior rib pain     O: /60 (BP Location: Left arm, Patient Position: Chair, Cuff Size: Adult Large)  Pulse 84  Resp 18  Wt 194 lb (88 kg)  LMP 02/10/2017 (Exact Date)  BMI 34.37 kg/m2   Gen: resting comfortably, in NAD  See Prenatal flowsheet    A: Paulo Lema is a 26 year old female  at 21w6d here for routine OB care.    P:   1)A+/RI. Anatomy scan normal, posterior placenta. Declined genetic testing/AFP. Sex will be a surprise. CBC/GCT today  2) Weigh gain of 23lb overall, reviewed healthy food options, frequent small meals, and continued exercise.  3) Discussed prenatal classes and hospital tour as well as childcare. Will provider her with an option for [facility] service contact info  4) return to clinic in 4 weeks      Karissa Wilkins MD  Obstetrics and Gynecology

## 2017-09-01 DIAGNOSIS — Z34.00 SUPERVISION OF NORMAL FIRST PREGNANCY, ANTEPARTUM: ICD-10-CM

## 2017-09-01 PROCEDURE — 82952 GTT-ADDED SAMPLES: CPT | Performed by: OBSTETRICS & GYNECOLOGY

## 2017-09-01 PROCEDURE — 36415 COLL VENOUS BLD VENIPUNCTURE: CPT | Performed by: OBSTETRICS & GYNECOLOGY

## 2017-09-01 PROCEDURE — 82951 GLUCOSE TOLERANCE TEST (GTT): CPT | Performed by: OBSTETRICS & GYNECOLOGY

## 2017-09-02 LAB
GLUCOSE 1H P 100 G GLC PO SERPL-MCNC: 188 MG/DL (ref 60–179)
GLUCOSE 2H P 100 G GLC PO SERPL-MCNC: 158 MG/DL (ref 60–154)
GLUCOSE 3H P 100 G GLC PO SERPL-MCNC: 106 MG/DL (ref 60–139)
GLUCOSE P FAST SERPL-MCNC: 85 MG/DL (ref 60–94)

## 2017-09-05 ENCOUNTER — TELEPHONE (OUTPATIENT)
Dept: OBGYN | Facility: CLINIC | Age: 27
End: 2017-09-05

## 2017-09-05 DIAGNOSIS — R73.09 ABNORMAL GLUCOSE TOLERANCE TEST: ICD-10-CM

## 2017-09-05 DIAGNOSIS — O24.419 GESTATIONAL DIABETES: ICD-10-CM

## 2017-09-05 DIAGNOSIS — Z34.00 SUPERVISION OF NORMAL FIRST PREGNANCY, ANTEPARTUM: Primary | ICD-10-CM

## 2017-09-06 ENCOUNTER — PRENATAL OFFICE VISIT (OUTPATIENT)
Dept: OBGYN | Facility: CLINIC | Age: 27
End: 2017-09-06
Payer: COMMERCIAL

## 2017-09-06 VITALS — SYSTOLIC BLOOD PRESSURE: 116 MMHG | BODY MASS INDEX: 34.88 KG/M2 | DIASTOLIC BLOOD PRESSURE: 68 MMHG | WEIGHT: 196.9 LBS

## 2017-09-06 DIAGNOSIS — Z34.00 SUPERVISION OF NORMAL FIRST PREGNANCY, ANTEPARTUM: Primary | ICD-10-CM

## 2017-09-06 DIAGNOSIS — Z23 NEED FOR TDAP VACCINATION: ICD-10-CM

## 2017-09-06 DIAGNOSIS — Z23 NEED FOR PROPHYLACTIC VACCINATION AND INOCULATION AGAINST INFLUENZA: ICD-10-CM

## 2017-09-06 PROCEDURE — 90715 TDAP VACCINE 7 YRS/> IM: CPT | Performed by: OBSTETRICS & GYNECOLOGY

## 2017-09-06 PROCEDURE — 90471 IMMUNIZATION ADMIN: CPT | Performed by: OBSTETRICS & GYNECOLOGY

## 2017-09-06 PROCEDURE — 90472 IMMUNIZATION ADMIN EACH ADD: CPT | Performed by: OBSTETRICS & GYNECOLOGY

## 2017-09-06 PROCEDURE — 90686 IIV4 VACC NO PRSV 0.5 ML IM: CPT | Performed by: OBSTETRICS & GYNECOLOGY

## 2017-09-06 PROCEDURE — 99207 ZZC PRENATAL VISIT: CPT | Performed by: OBSTETRICS & GYNECOLOGY

## 2017-09-06 NOTE — NURSING NOTE
"Chief Complaint   Patient presents with     Prenatal Care     29 weeks 4 days, patient was just dx with gestational diabetes       Initial /68 (BP Location: Right arm, Patient Position: Chair, Cuff Size: Adult Large)  Wt 196 lb 14.4 oz (89.3 kg)  LMP 02/10/2017 (Exact Date)  BMI 34.88 kg/m2 Estimated body mass index is 34.88 kg/(m^2) as calculated from the following:    Height as of 7/14/17: 5' 3\" (1.6 m).    Weight as of this encounter: 196 lb 14.4 oz (89.3 kg).  Medication Reconciliation: complete     Maureen Siegel CMA      "

## 2017-09-06 NOTE — PROGRESS NOTES
Injectable Influenza Immunization Documentation    1.  Is the person to be vaccinated sick today?  No    2. Does the person to be vaccinated have an allergy to eggs or to a component of the vaccine?  No    3. Has the person to be vaccinated today ever had a serious reaction to influenza vaccine in the past?  No    4. Has the person to be vaccinated ever had Guillain-[place] syndrome?  No     Form completed by Marleny Almaraz MA

## 2017-09-06 NOTE — PROGRESS NOTES
Routine OB Visit:    S: No bleeding or cramping. +movement. Anxiety regarding recent diagnosis of gestational diabetes, awaiting meeting with diabetes educator.    O: /68 (BP Location: Right arm, Patient Position: Chair, Cuff Size: Adult Large)  Wt 196 lb 14.4 oz (89.3 kg)  LMP 02/10/2017 (Exact Date)  BMI 34.88 kg/m2   Gen: resting comfortably, in NAD  See Prenatal flowsheet    A: Paulo Lema is a 26 year old female  at 29w4d here for routine OB care.    P:   1)A+/RI. Anatomy scan normal, posterior placenta. Declined genetic testing/AFP. Sex will be a surprise. Failed GTT, awaiting DM education.  2) gestational diabetes, awaiting DM education. Discussed diet and exercise in the mean time.  3) return in 2 weeks for routine care      Karissa Wilkins MD  Obstetrics and Gynecology

## 2017-09-06 NOTE — MR AVS SNAPSHOT
After Visit Summary   9/6/2017    Paulo Lema    MRN: [id number]           Patient Information     Date Of Birth          1990        Visit Information        Provider Department      9/6/2017 4:00 PM Karissa Wilkins MD [facility]        Today's Diagnoses     Supervision of normal first pregnancy, antepartum    -  1    Need for prophylactic vaccination and inoculation against influenza        Need for Tdap vaccination           Follow-ups after your visit        Your next 10 appointments already scheduled     Sep 20, 2017  2:30 PM CDT   Diabetic Education with RI NUTRITION RESOURCE   [facility] ([facility])    [address]  [facility] [address]   [phone]            Sep 20, 2017  3:15 PM CDT   ESTABLISHED PRENATAL with Disha Tsang MD   [facility] ([facility])    [address]  [facility] [id number] [phone]            Oct 04, 2017  3:30 PM CDT   ESTABLISHED PRENATAL with Disha Tsang MD   [facility] ([facility])    [address]  [facility] [id number] [phone]            Oct 18, 2017  3:30 PM CDT   ESTABLISHED PRENATAL with Disha Tsang MD   [facility] ([facility])    [address]  [facility] [id number] [phone]              Who to contact     If you have questions or need follow up information about today's clinic visit or your schedule please contact [facility] directly at [phone].  Normal or non-critical lab and imaging results will be communicated to you by MyChart, letter or phone within 4 business days after the clinic has received the results. If you do not hear from us within 7 days, please contact the clinic through MyChart or phone. If you have a critical or abnormal lab result, we will notify you by phone as soon as  "possible.  Submit refill requests through [employer] or call your pharmacy and they will forward the refill request to us. Please allow 3 business days for your refill to be completed.          Additional Information About Your Visit        [employer] Information     [employer] lets you send messages to your doctor, view your test results, renew your prescriptions, schedule appointments and more. To sign up, go to [email or web address] . Click on \"Log in\" on the left side of the screen, which will take you to the Welcome page. Then click on \"Sign up Now\" on the right side of the page.     You will be asked to enter the access code listed below, as well as some personal information. Please follow the directions to create your username and password.     Your access code is: [email or web address]  Expires: 10/12/2017 10:27 AM     Your access code will  in 90 days. If you need help or a new code, please call your [place] clinic or [phone].        Care EveryWhere ID     This is your Care EveryWhere ID. This could be used by other organizations to access your [place] medical records  [id number]        Your Vitals Were     Last Period BMI (Body Mass Index)                02/10/2017 (Exact Date) 34.88 kg/m2           Blood Pressure from Last 3 Encounters:   17 116/68   08/10/17 124/60   17 116/60    Weight from Last 3 Encounters:   17 196 lb 14.4 oz (89.3 kg)   08/10/17 194 lb (88 kg)   17 181 lb 1.6 oz (82.1 kg)              We Performed the Following     FLU VAC, SPLIT VIRUS IM > 3 YO (QUADRIVALENT) [11756]     TDAP VACCINE (ADACEL)        Primary Care Provider    Physician No Ref-Primary       No address on file        Equal Access to Services     LINDSAY RODRIGUEZ : Anel Martínez, tova keys, ericka arguetaalmashahram meek, sidra soriano. So [facility] [phone].    ATENCIÓN: Si habla español, tiene a golden disposición servicios gratuitos de asistencia lingüística. " Kristine mtz [phone].    We comply with applicable federal civil rights laws and [place] laws. We do not discriminate on the basis of race, color, national origin, age, disability sex, sexual orientation or gender identity.            Thank you!     Thank you for choosing [facility]  for your care. Our goal is always to provide you with excellent care. Hearing back from our patients is one way we can continue to improve our services. Please take a few minutes to complete the written survey that you may receive in the mail after your visit with us. Thank you!             Your Updated Medication List - Protect others around you: Learn how to safely use, store and throw away your medicines at www.disposemymeds.org.          This list is accurate as of: 9/6/17 11:59 PM.  Always use your most recent med list.                   Brand Name Dispense Instructions for use Diagnosis    prenatal multivitamin plus iron 27-0.8 MG Tabs per tablet      Take 1 tablet by mouth daily

## 2017-09-11 ENCOUNTER — ALLIED HEALTH/NURSE VISIT (OUTPATIENT)
Dept: EDUCATION SERVICES | Facility: CLINIC | Age: 27
End: 2017-09-11
Payer: COMMERCIAL

## 2017-09-11 DIAGNOSIS — O24.419 GESTATIONAL DIABETES: ICD-10-CM

## 2017-09-11 DIAGNOSIS — O24.419 GESTATIONAL DIABETES: Primary | ICD-10-CM

## 2017-09-11 PROCEDURE — G0109 DIAB MANAGE TRN IND/GROUP: HCPCS

## 2017-09-11 NOTE — MR AVS SNAPSHOT
After Visit Summary   9/11/2017    Paulo Lema    MRN: [id number]           Patient Information     Date Of Birth          1990        Visit Information        Provider Department      9/11/2017 9:00 AM RI GDM [facility]        Today's Diagnoses     Gestational diabetes    -  1       Follow-ups after your visit        Your next 10 appointments already scheduled     Sep 20, 2017  2:30 PM CDT   Diabetic Education with RI NUTRITION RESOURCE   [facility] ([facility])    [address]  [facility] [address] [phone]            Sep 20, 2017  3:15 PM CDT   ESTABLISHED PRENATAL with Disha Tsang MD   [facility] ([facility])    [address]  [facility] [id number] [phone]            Oct 04, 2017  3:30 PM CDT   ESTABLISHED PRENATAL with Disha Tsang MD   [facility] ([facility])    [address]  [facility] [id number] [phone]            Oct 18, 2017  3:30 PM CDT   ESTABLISHED PRENATAL with Disha Tsang MD   [facility] ([facility])    [address]  [facility] [id number] [phone]              Who to contact     If you have questions or need follow up information about today's clinic visit or your schedule please contact [facility] directly at [phone].  Normal or non-critical lab and imaging results will be communicated to you by MyChart, letter or phone within 4 business days after the clinic has received the results. If you do not hear from us within 7 days, please contact the clinic through MyChart or phone. If you have a critical or abnormal lab result, we will notify you by phone as soon as possible.  Submit refill requests through [employer] or call your pharmacy and they will forward the refill request to us. Please allow 3 business days for  "your refill to be completed.          Additional Information About Your Visit        [employer] Information     [employer] lets you send messages to your doctor, view your test results, renew your prescriptions, schedule appointments and more. To sign up, go to [email or web address] . Click on \"Log in\" on the left side of the screen, which will take you to the Welcome page. Then click on \"Sign up Now\" on the right side of the page.     You will be asked to enter the access code listed below, as well as some personal information. Please follow the directions to create your username and password.     Your access code is: [email or web address]  Expires: 10/12/2017 10:27 AM     Your access code will  in 90 days. If you need help or a new code, please call your [place] clinic or [phone].        Care EveryWhere ID     This is your Care EveryWhere ID. This could be used by other organizations to access your [place] medical records  [id number]        Your Vitals Were     Last Period                   02/10/2017 (Exact Date)            Blood Pressure from Last 3 Encounters:   17 116/68   08/10/17 124/60   17 116/60    Weight from Last 3 Encounters:   17 89.3 kg (196 lb 14.4 oz)   08/10/17 88 kg (194 lb)   17 82.1 kg (181 lb 1.6 oz)              We Performed the Following     DIABETES EDUCATION - Group []           Today's Medication Changes          These changes are accurate as of: 17 11:51 AM.  If you have any questions, ask your nurse or doctor.               Start taking these medicines.        Dose/Directions    acetone (Urine) test Strp   Used for:  Gestational diabetes        Test once daily x1 week, then reduce to once weekly if consistently negative   Quantity:  100 each   Refills:  3       blood glucose monitoring lancets   Used for:  Gestational diabetes        Use to test blood sugar 4 times daily or as directed.   Quantity:  100 each   Refills:  1       blood glucose " monitoring test strip   Commonly known as:  DACIA CONTOUR NEXT   Used for:  Gestational diabetes        Use to test blood sugar 4 times daily or as directed.   Quantity:  100 strip   Refills:  3            Where to get your medicines      These medications were sent to [facility] - [place], [place] - [address]  [address], [facility] [address]     Phone:  [phone]     acetone (Urine) test Strp    blood glucose monitoring lancets    blood glucose monitoring test strip                Primary Care Provider    Physician No Ref-Primary       No address on file        Equal Access to Services     [facility] Hospital[facility]ATHY : Hadii fátima ku hadasho Soomaali, waaxda luqadaha, qaybta kaalmada adeegyada, sidra gresham . So [facility] [phone].    ATENCIÓN: Si habla español, tiene a golden disposición servicios gratuitos de asistencia lingüística. [facility] [phone].    We comply with applicable federal civil rights laws and [place] laws. We do not discriminate on the basis of race, color, national origin, age, disability sex, sexual orientation or gender identity.            Thank you!     Thank you for choosing [facility]  for your care. Our goal is always to provide you with excellent care. Hearing back from our patients is one way we can continue to improve our services. Please take a few minutes to complete the written survey that you may receive in the mail after your visit with us. Thank you!             Your Updated Medication List - Protect others around you: Learn how to safely use, store and throw away your medicines at www.disposemymeds.org.          This list is accurate as of: 9/11/17 11:51 AM.  Always use your most recent med list.                   Brand Name Dispense Instructions for use Diagnosis    acetone (Urine) test Strp     100 each    Test once daily x1 week, then reduce to once weekly if consistently  negative    Gestational diabetes       blood glucose monitoring lancets     100 each    Use to test blood sugar 4 times daily or as directed.    Gestational diabetes       blood glucose monitoring test strip    DACIA CONTOUR NEXT    100 strip    Use to test blood sugar 4 times daily or as directed.    Gestational diabetes       prenatal multivitamin plus iron 27-0.8 MG Tabs per tablet      Take 1 tablet by mouth daily

## 2017-09-11 NOTE — PROGRESS NOTES
Diabetes Self-Management Training - Gestational Diabetes    SUBJECTIVE/OBJECTIVE:  Paulo Lema presents today for education related to gestational diabetes.  She is accompanied by self    Patient's gestational diabetes management related comments/concerns: what to eat    Patient's emotional response to diabetes: expresses readiness to learn and concern for health and well-being    LMP 02/10/2017 (Exact Date)    Pre pregnancy weight: 170#    Weight gain 25 lbs at 30 weeks gestation.    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 meals and ? snacks per day.  Patient did not include food records    Other time(s) food is eaten? ?     Cultural/Mormon diet restrictions: No     Biggest challenge to healthy eating is:  knowing what to eat    Pre- Vitamin: Yes    Supplements: No   Experiencing nausea?  No     Socio/Economic considerations:  Support System: family and spouse/significant other    Health Beliefs and Attitudes:   Stage of Change: PREPARATION (Decided to change - considering how)    ASSESSMENT:  Needs assistance with meal planning and BG testing    INTERVENTION:  Patient was instructed on Cot[employer] Next One meter and was able to provide an accurate return demonstration. Patient's blood glucose reading today was 129 mg/dL.    Educational topics covered today:  GDM diagnosis, pathophysiology, Risks and Complications of GDM, Means of controlling GDM, Using a Blood Glucose Monitor, Blood Glucose Goals, Logging and Interpreting Glucose Results, Ketone Testing, When to Call a Diabetes Educator or OB Provider, Healthy Eating During Pregnancy, Counting  Carbohydrates, Meal Planning for GDM, and Physical Activity    Educational materials provided today:   [facility] Understanding Gestational Diabetes  GDM Log Book  Sharps Disposal  Care After Delivery  Cot[employer] Next One meter kit    Pt verbalized understanding of concepts discussed and recommendations provided today.     PLAN:  Check glucose 4 times daily, before breakfast and 1 hour after each meal.     Check Ketones daily for one week, if negative, reduce testing to once a week.     Physical activity recommended: as able.    Meal plan: 2-3 carbs at breakfast, 3-4 carbs at lunch, 3-4 carbs at supper, 1-2 carbs at 3 snacks a day.  Follow consistent CHO meal plan, eat CHO and protein/fat at all meals/snacks.    Call/e-mail/MyChart message diabetes educator if 3 or more blood sugars are above the goal in 1 week or if ketones are positive.     Call/e-mail/MyChart message with questions/concerns.    FOLLOW-UP:  Call or e-mail educator if 3 or more blood sugars are above goal in 1 week.  Call or e-mail with questions or concerns.  Appointment scheduled on 9/20.     Sally Schmidt RD LD CDE  Time Spent: 90 minutes  Encounter type: Group class

## 2017-09-12 ENCOUNTER — TELEPHONE (OUTPATIENT)
Dept: EDUCATION SERVICES | Facility: CLINIC | Age: 27
End: 2017-09-12

## 2017-09-12 NOTE — TELEPHONE ENCOUNTER
Email received from patient:  Hi,     My name is Paulo Lema and my date of birth is [birth year].     I had a diabetic class this morning and feel okay about everything but want to get more ideas for meals.    Can I get a meal plan to follow each day for a couple of days? I already am going to use the 2 day meal plan on the back of the packet we received today, but would like more options!    Thank you,  Paulo  --   Paulo Lema    [facility]  [phone]    Email response to patient:  Dave Bates,    I am one of the educators helping out with emails today.  Thanks for sending in your questions.  I have included some sample meal plans for breakfast and lunch/dinner that include varying amount of carbohydrates.  Each meal is in a box.  Your meal plan is 2-3 carbs at breakfast, 3-4 at lunch and dinner, and 1-2 at 3 snacks daily.  Pick and choose or modify meals as you wish, but make sure all types of food are at the meal (carbohydrate, protein, vegetable).    I also included some snack portions as well.  Let us know if you have any questions or need further resources.    Risa Beckford MS, RD, LD, CDE      Sample 2 Carb Breakfast Choices- Carbohydrate choices found in (  )   1/2 cup cooked cereal  (1)  1 tablespoon raisins (1)  2 tablespoons of nuts (0)      1 whole grain English muffin (2)    1 tablespoon of peanut butter (0)  6 oz light yogurt (1)  1 cup berries (1)    1 hard-boiled egg (0) 1 slice whole grain toast (1) with 1 tsp butter (0)  1 poached egg (0)    banana (1)   8 oz skim milk (1) +  1 packet of no-sugar-added instant breakfast mix (1) 2 slices whole grain toast (2)   1 scrambled egg (0)  1 slice low fat cheese (0) 1 piece whole grain toast (1)    cup breakfast potatoes (1)  1 boiled egg (0)  Vegetables (0)   1 cup skim milk (1)     medium banana (1)  1 Tbsp peanut butter (0)         Sample 3 Carb Breakfast Choices- Carbohydrate choices found in (  )    cup cooked  cereal (1.5)  1 cup blueberries (1)  4 oz skim milk (0.5)  2 tablespoons of nuts (0)   1 whole grain English muffin (2)    cup mixed fruit (1)  1 boiled egg (0) 6 oz light yogurt (1)  1 small orange (1)  1 slice whole grain toast (1)  1 slice low fat cheese (0)    1 small whole grain tortilla (1)  1 cup skim milk (1)     banana (1)  1 Tablespoon peanut butter (0)   8 oz skim milk (1) + 1 packet of instant breakfast mix (2)   2 slices whole grain toast (2)   1 cup skim milk (1)  1 scrambled egg (0) 1 cup cubed sweet potatoes (2)  1 scrambled egg  (0)  Vegetables (0)  1 cup skim milk (1)   1 cup of skim milk (1)  1 medium banana (2)  1-2 tablespoons of peanut butter  (0)     Sample 3 Carb Breakfast Choices- Carbohydrate choices found in (  )    cup cooked cereal (1.5)  1 cup blueberries (1)  4 oz skim milk (0.5)  2 tablespoons of nuts (0)   1 whole grain English muffin (2)    cup mixed fruit (1)  1 boiled egg (0) 6 oz light yogurt (1)  1 small orange (1)  1 slice whole grain toast (1)  1 slice low fat cheese (0)    1 small whole grain tortilla (1)  1 cup skim milk (1)     banana (1)  1 Tablespoon peanut butter (0)   8 oz skim milk (1) + 1 packet of instant breakfast mix (2)   2 slices whole grain toast (2)   1 cup skim milk (1)  1 scrambled egg (0) 1 cup cubed sweet potatoes (2)  1 scrambled egg  (0)  Vegetables (0)  1 cup skim milk (1)   1 cup of skim milk (1)  1 medium banana (2)  1-2 tablespoons of peanut butter  (0)       3 Carbohydrate Choice Sample Meal Plans for Lunch or Supper   Carbohydrate choices found in (   )  2/3 cup whole grain spaghetti noodles (2) +    cup low sodium pasta sauce (1)+ 4 oz lean ground beef or turkey (0) +  2 cups lettuce + veggies (0) + 1-2 tsbp salad dressing (0) 2 slices of whole grain bread (2)  3 oz lean turkey (0) + lettuce/tomato (0)  2 tsp godfrey (0)  1 small apple (1) 1 cup of low sodium broth based soup (1) +  2 slices of whole grain bread (2)   2 oz of low fat cheese (0) + 1  tsp butter (0)  Carrots/celery (0) 2 cups lettuce salad (0) +   cup garbanzo beans (1) + +   cup tuna (0) + 2 Tablespoons low-fat vinaigrette salad dressing (0) +   cup grapes (1) + 6 oz light yogurt (1)   1 cup low sodium broth based soup (1) + 6 saltine crackers (1) + 1 small apple (1) + broccoli/cauliflower and low fat dip (0)   3 oz lean steak (0) + 1 small 3 oz baked potato (1) + 1 tsp low fat sour cream (0) + 1 cup green beans (0) + 1 small dinner roll (1)   1 cup berries (1) + 1 tbsp light whipped cream (0)  2 cups lettuce salad (0)   3 oz grilled chicken (0)  1-2 Tsbp light Caesar dressing (0)  + 1 Tbps grated cheese (0)    cup grapes (1)  5 Triscuit crackers (1)  8 oz skim milk (1)   3 oz chicken (0)  1 cup sweet potato (2)  1 cup asparagus (0)  1 small apple (1)  16 oz sparkling water (unsweetened)   1 hamburger jake (0) on hamburger bun (2) + 1/2 order of small gagnon (1) + + 1 garden salad (0) with 1 package of vinaigrette (0) + 1 cup baby carrots + 1/2 cup green beans-cooked (0)  2 slices whole grain bread (2)+ 2 oz lean ham (0) + leaf lettuce/tomato/onion (0)  2 teaspoons godfrey (0)  1 small pear (1)  1 cup raw veggie sticks (0) 2 slices thin crust pizza (2) + 2 cups lettuce salad (0) + 10 cherry tomatoes (0) + 2 Tbsp light salad dressing (0) + 1 small peach (1) 2 small whole grain tortillas (2) +   cup fat free refried beans (1) + 3 oz shredded lean beef (0) + 2 Tsbp salsa (0)+ lettuce/tomato (0) + 1 Tbsp light sour cream (0)   1 cup low sodium chicken noodle soup (1) + 2 slices whole grain bread (2) + 2 oz lean turkey (0) + 1 oz low fat cheese (0) + 1-2 teaspoons of butter or margarine  3 oz turkey (0) + 1 cup mashed potatoes (2) + 1 tsp butter (0) + 1 cup green beans (0) +   cup unsweetened apple sauce (1) 3 oz chicken + 2 medium pierogis (2) + 1/3 cup cabbage (0) +   cup grapes (1) +   cup green beans (0) I cup of beef stroganoff (0) + 2/3 cup egg noodles (2) + 1 cup broccoli (0) + 1 cup carrots (0) +  1 cup of watermelon (1)     4 Carbohydrate Choice Meal Plans for Lunch or Supper   Carbohydrate choices found in (   )  1 cup whole grain spaghetti (3) +   cup low sodium pasta sauce (1) + 3 oz lean ground turkey (0) + 2 cups lettuce/veggies (0) + 2 Tbsp dressing (0) 2 slices of whole grain bread (2)  3 oz lean turkey (0) + lettuce/tomato (0)  2 tsp godfrey (0)  6 whole grain crackers (1)  1 small apple (1) 1 cup of low sodium broth based soup (1) +  2 slices of whole grain bread (2)   2 oz of low fat cheese (0) + 1 tsp butter (0)  Carrots/celery (0)    cup grapes (1) 2 cups lettuce salad (0) +   cup garbanzo beans (1) + +   cup tuna (0) + 2 Tablespoons low-fat vinaigrette salad dressing (0)  + 6 oz light yogurt (1) + 3 rye crackers (1) + 8 oz skim milk (1)    2 cups low sodium broth based soup (2) + 6 saltine crackers (1) + 1 small apple (1) + broccoli/cauliflower and low fat dip (0)   3 oz lean steak (0) + medium baked potato (2) + 1 tsp low fat sour cream (0) + 1 cup green beans (0) + 1 small dinner roll (1)   1 cup berries (1) + 1 tbsp light whipped cream (0) 2 cups lettuce salad (0)   3 oz grilled chicken (0)  1-2 Tsbp light Caesar dressing (0)  + 1 Tbps grated cheese (0)  1 cup grapes (2)  5 Triscuit crackers (1)  8 oz skim milk (1)   3 oz chicken (0)  1 cup sweet potato (2)  1 cup asparagus (0)  1 small apple (1)  16 oz sparkling water (unsweetened)  1-8 oz glass skim milk (1)   1 hamburger jake (0) on hamburger bun (2) + small french fries (2) + + 1 garden salad (0) with 1 package of vinaigrette (0) + 1 cup baby carrots + 1/2 cup green beans- cooked (0) 2 slices whole grain bread (2)+ 2 oz lean ham (0) + leaf lettuce/tomato/onion (0)  2 teaspoons godfrey (0)  1 medium pear (2)  1 cup raw veggie sticks (0) 3 slices thin crust pizza (3) + 2 cups lettuce salad (0) + 10 cherry tomatoes (0) + 2 Tbsp light salad dressing (0) + 1 small peach (1) 3-6  whole grain tortillas (3) +   cup fat free refried beans (1) + 3 oz  shredded lean beef (0) + 2 Tsbp salsa (0)+ lettuce/tomato (0) + 1 Tbsp light sour cream (0)   1 cup low sodium chicken noodle soup (1) + 2 slices whole grain bread (2) + 2 oz lean turkey (0) + 1 oz low fat cheese (0) + 1-2 teaspoons of butter or margarine    cup peaches (1) 3 oz turkey (0) + 1 cup mashed potatoes (2) + 1 tsp butter (0) + 1 cup green beans (0) +   cup unsweetened apple sauce (1)  1-8oz  cup of skim milk (1) 3 oz chicken + 3 medium pierogis (3) + 1/3 cup cabbage (0) +   cup unsweetened applesauce (1) +   cup green beans (0) I cup of beef stroganoff (0) + 1 cup egg noodles (3) + 1 cup broccoli (0) + 1 cup carrots (0) + 1 cup of watermelon (1)     Here are some ideas for breakfast or bedtime snack. Pick a carbohydrate from the right and a protein from the left. If you have carbohydrates 30 grams of total carbohydrate and 3-5 grams of fiber that is even better.   Fruits are not listed as they can cause the blood sugar to raise blood sugar quickly and be used up early in the night. Fruits are better at meals, or morning or afternoon snack.     Protein/Fat list (about 14 grams of protein or 2 fat servings) Carbohydrate list (about 30 grams of carbohydrate)   2 slices of cheese English Muffin   2 TBS Peanut butter/[place] butter/Sun butter 10 crackers     cup Cottage cheese 2% 2 pieces of toast   2 oz any cooked meat - less than deck of cards size 1 hamburger or hot dog bun   1.5 oz of soy nuts 1 whole wheat alix   Avocado or guacamole 6 marisol cracker squares     cup tuna - can add mayonnaise 1 cup full fat ice cream - no candy or sauce   2 eggs - boiled, poached, fried, scrambled, omelet 30 chips   1 veggie jake (might have carbohydrates also) Greek yogurt - 30 grams of carbohydrate   2 TBS Coconut 2 - 6 inch tortillas corn or flour   12 shrimp - not breaded 2 toaster waffles - no syrup   2-1oz meatballs   bagel   2 Tbs cream cheese - plain, veggie, salmon - no fruit or honey. 6 cups popcorn - unsweetened      cup of nuts Granola bar of 3o grams of carbohydrate   10 olives 1 cup of unsweetened lentils or beans.    Tofu or Temph 4-5oz 1 cup potato salad     You can always add vegetables with dip, salad dressing or salsa also.

## 2017-09-20 ENCOUNTER — PRENATAL OFFICE VISIT (OUTPATIENT)
Dept: OBGYN | Facility: CLINIC | Age: 27
End: 2017-09-20
Payer: COMMERCIAL

## 2017-09-20 ENCOUNTER — ALLIED HEALTH/NURSE VISIT (OUTPATIENT)
Dept: NUTRITION | Facility: CLINIC | Age: 27
End: 2017-09-20
Payer: COMMERCIAL

## 2017-09-20 VITALS
BODY MASS INDEX: 34.91 KG/M2 | DIASTOLIC BLOOD PRESSURE: 74 MMHG | HEIGHT: 63 IN | SYSTOLIC BLOOD PRESSURE: 122 MMHG | HEART RATE: 72 BPM | WEIGHT: 197 LBS

## 2017-09-20 DIAGNOSIS — O24.410 DIET CONTROLLED GESTATIONAL DIABETES MELLITUS (GDM) IN THIRD TRIMESTER: Primary | ICD-10-CM

## 2017-09-20 DIAGNOSIS — O24.414 INSULIN CONTROLLED GESTATIONAL DIABETES MELLITUS (GDM) IN THIRD TRIMESTER: ICD-10-CM

## 2017-09-20 DIAGNOSIS — O24.419 GESTATIONAL DIABETES: Primary | ICD-10-CM

## 2017-09-20 PROCEDURE — 99207 ZZC PRENATAL VISIT: CPT | Performed by: OBSTETRICS & GYNECOLOGY

## 2017-09-20 PROCEDURE — G0108 DIAB MANAGE TRN  PER INDIV: HCPCS

## 2017-09-20 NOTE — PROGRESS NOTES
PROBLEM LIST  LABS: Apos/RI Gender is a surprise    1. gestational diabetes, currently diet-controlled.    Elevated FBS, so will try middle of the night snack for 2 days but may need to start evening NPH this week. If so, needs twice weekly fetal testing and also would need IOL at/by 39 weeks. All discussed and I think she understands. Follow up in 2 weeks with me, but we will be in touch about further plans depending on her blood sugars.    Disha Tsang MD

## 2017-09-20 NOTE — MR AVS SNAPSHOT
After Visit Summary   9/20/2017    Paulo Lema    MRN: [id number]           Patient Information     Date Of Birth          1990        Visit Information        Provider Department      9/20/2017 2:30 PM RI NUTRITION RESOURCE [facility] Instructions    1. Check blood sugar 4 times a day, before breakfast and 1 hour after the start of each meal.     2. Check urine ketones when you wake up every morning for 7 days. If negative everyday, reduce testing to once a week.    3. Follow the recommended meal plan: eat something every 2-3 hours, include protein/fat and carbohydrate at every meal and snack, have 2-3 carbs at breakfast, 3-4 carbs at lunch, 3-4 carbs at supper, 1-2 carbs at 3 snacks per day.     4. Add activity to every day, try walking or being active after each meal to help control blood sugar levels.    5.Call or e-mail educator if 3 or more blood sugars are above goal in 1 week. Call or e-mail with questions or concerns.    CALL US ON Friday MORNING WITH YOUR BLOOD SUGARS    Here are some ideas for breakfast or bedtime snack. Pick a carbohydrate from the right and a protein from the left. If you have carbohydrates 30 grams of total carbohydrate and 3-5 grams of fiber that is even better.   Fruits are not listed as they can cause the blood sugar to raise blood sugar quickly and be used up early in the night. Fruits are better at meals, or morning or afternoon snack.     Protein/Fat list (about 14 grams of protein or 2 fat servings) Carbohydrate list (about 30 grams of carbohydrate)   2 slices of cheese English Muffin   2 TBS Peanut butter/[place] butter/Sun butter 10 crackers     cup Cottage cheese 2% 2 pieces of toast   2 oz any cooked meat - less than deck of cards size 1 hamburger or hot dog bun   1.5 oz of soy nuts 1 whole wheat alix   Avocado or guacamole 6 marisol cracker squares     cup tuna - can add mayonnaise 1 cup full fat ice cream - no candy or  sauce   2 eggs - boiled, poached, fried, scrambled, omelet 30 chips   1 veggie jake (might have carbohydrates also) Greek yogurt - 30 grams of carbohydrate   2 TBS Coconut 2 - 6 inch tortillas corn or flour   12 shrimp - not breaded 2 toaster waffles - no syrup   2-1oz meatballs   bagel   2 Tbs cream cheese - plain, veggie, salmon - no fruit or honey. 6 cups popcorn - unsweetened     cup of nuts Granola bar of 3o grams of carbohydrate   10 olives 1 cup of unsweetened lentils or beans.    Tofu or Temph 4-5oz 1 cup potato salad     You can always add vegetables with dip, salad dressing or salsa also.           [facility] [facility] for the [facility] Area:  For Your Diabetes Education or Nutrition Appointments Call:  [phone]   For Diabetes Education and Nutrition Related Questions:   Phone: [phone]  E-mail: [email or web address]  Fax: [phone]   If you need a medication refill please contact your pharmacy. Please allow 3 business days for your refills to be completed.           Follow-ups after your visit        Your next 10 appointments already scheduled     Sep 20, 2017  3:15 PM CDT   ESTABLISHED PRENATAL with Disha Tsang MD   [facility]    [address]  [facility] [id number] [phone]            Oct 04, 2017  3:30 PM CDT   ESTABLISHED PRENATAL with Disha Tsang MD   [facility] ([facility])    [address]  [facility] [id number] [phone]            Oct 18, 2017  3:30 PM CDT   ESTABLISHED PRENATAL with Disha Tsang MD   [facility] ([facility])    [address]  [facility] [id number] [phone]              Who to contact     If you have questions or need follow up information about today's clinic visit or your schedule please contact [facility] directly at  "[phone].  Normal or non-critical lab and imaging results will be communicated to you by CH4ehart, letter or phone within 4 business days after the clinic has received the results. If you do not hear from us within 7 days, please contact the clinic through CH4ehart or phone. If you have a critical or abnormal lab result, we will notify you by phone as soon as possible.  Submit refill requests through Mebelrama or call your pharmacy and they will forward the refill request to us. Please allow 3 business days for your refill to be completed.          Additional Information About Your Visit        CH4ehart Information     Mebelrama lets you send messages to your doctor, view your test results, renew your prescriptions, schedule appointments and more. To sign up, go to [email or web address] . Click on \"Log in\" on the left side of the screen, which will take you to the Welcome page. Then click on \"Sign up Now\" on the right side of the page.     You will be asked to enter the access code listed below, as well as some personal information. Please follow the directions to create your username and password.     Your access code is: [email or web address]  Expires: 10/12/2017 10:27 AM     Your access code will  in 90 days. If you need help or a new code, please call your [facility] clinic or [phone].        Care EveryWhere ID     This is your Care EveryWhere ID. This could be used by other organizations to access your [facility] medical records  [id number]        Your Vitals Were     Last Period                   02/10/2017 (Exact Date)            Blood Pressure from Last 3 Encounters:   17 116/68   08/10/17 124/60   17 116/60    Weight from Last 3 Encounters:   17 89.3 kg (196 lb 14.4 oz)   08/10/17 88 kg (194 lb)   17 82.1 kg (181 lb 1.6 oz)              Today, you had the following     No orders found for display       Primary Care Provider    Physician No Ref-Primary       No address on file      "   Equal Access to Services     [facility] : Hadii aad ku hadrandymiriam Aggieelpidio, wamarada luqadaha, qaybta kajacquelinshahram meek, sidra soriano. So [facility] [phone].    ATENCIÓN: Si habla español, tiene a golden disposición servicios gratuitos de asistencia lingüística. Llame al [phone].    We comply with applicable federal civil rights laws and [place] laws. We do not discriminate on the basis of race, color, national origin, age, disability sex, sexual orientation or gender identity.            Thank you!     Thank you for choosing [facility]  for your care. Our goal is always to provide you with excellent care. Hearing back from our patients is one way we can continue to improve our services. Please take a few minutes to complete the written survey that you may receive in the mail after your visit with us. Thank you!             Your Updated Medication List - Protect others around you: Learn how to safely use, store and throw away your medicines at www.disposemymeds.org.          This list is accurate as of: 9/20/17  3:09 PM.  Always use your most recent med list.                   Brand Name Dispense Instructions for use Diagnosis    acetone (Urine) test Strp     100 each    Test once daily x1 week, then reduce to once weekly if consistently negative    Gestational diabetes       DACIA CONTOUR NEXT test strip   Generic drug:  blood glucose monitoring     300 strip    USE TO TEST BLOOD SUGAR FOUR TIMES DAILY OR AS DIRECTED    Gestational diabetes       blood glucose monitoring lancets     100 each    Use to test blood sugar 4 times daily or as directed.    Gestational diabetes       prenatal multivitamin plus iron 27-0.8 MG Tabs per tablet      Take 1 tablet by mouth daily

## 2017-09-20 NOTE — Clinical Note
FYI - Patient to call in on Friday.  May need NPH at bedtime.  Was taught how to use.  MD is Sharan with [facility]. Yvette De Paz RD LD CDE

## 2017-09-20 NOTE — PROGRESS NOTES
Gestational Diabetes Follow-up Visit    SUBJECTIVE/OBJECTIVE:  Paulo Lema presents today for education and evaluation of glucose control related to gestational diabetes    She is accompanied by self    Patient's gestational diabetes management related comments/concerns: none    LMP 02/10/2017 (Exact Date)      Blood Glucose/Ketone Log:    Date Ketones Fasting Post Breakfast Post Lunch Post Supper   9/12 t 96 124 114 120   9/13 t 99 113 114 77   9/14 t 94 131 147 119   9/15 t 89 131 143 123   9/16 t 97 138 123 97   9/17 t 93 109 94 94   9/18 t 97 132 98 101   9/19 t 92 140 120 103   9/20 small 96 145 95      Current gestational diabetes management:    Taking medications for gestational diabetes? No    Physical Activity: minimal exercise - walking, teaching dance    Nutrition:  Patient eats 3 meals and 3 snacks per day and is following recommended meal plan.    ASSESSMENT:  44% fasting BGs within goal  89% post breakfast BG within goal  77% post lunch BG within goal  100% post supper BG within goal      Health Beliefs and Attitudes:   Stage of Change: ACTION (Actively working towards change)    INTERVENTION:  Educational topics covered today:  What to expect after delivery, Future testing for Type 2 diabetes (2 hour OGTT at 6 week post-partum check-up and annual fasting blood glucose level), Risk of GDM and planning ahead for future pregnancies, Recommended lifestyle interventions for reducing the risk of Type 2 Diabetes, When to Call a Diabetes Educator or OB Provider    Educational Materials provided today:  [place] Preventing Diabetes    PLAN:  Check glucose 4 times daily.  Check ketones once a week when readings are consistently negative.  Continue with recommended physical activity.  Continue to follow recommended meal plan: 2-3 carbs at breakfast, 3-4 carbs at lunch, 3-4 carbs at supper, 1-2 carbs at snacks.  Follow consistent CHO meal plan, eat CHO and protein/fat at all  meals/snacks.    Call/e-mail/MyChart message diabetes educator if 3 or more blood sugars are above the goal in 1 week or if ketones are positive.     FOLLOW-UP:  Follow up with diabetes educator in 2 days.  Anticipate patient may need to start insulin based on only 44% fasting BGs within goal.  Patient would like to try a 2am snack to see if this helps bring numbers in target.  We did discuss insulin today and demonstrated injection technique.  Patient was given written instructions on the NPH Kwikpen, as well as the voucher.    Call/e-mail/MyChart message diabetes educator if 3 or more blood sugars are above the goal in 1 week or if ketones are positive.     BRITTNY Lloyd CDE    Time spent was 40 minutes  Encounter type: Individual    Any diabetes medication dose changes were made via the CDE Protocol and Collaborative Practice Agreement with the patient's referring provider and OB/GYN provider. A copy of this encounter was shared with the provider.

## 2017-09-20 NOTE — PATIENT INSTRUCTIONS
1. Check blood sugar 4 times a day, before breakfast and 1 hour after the start of each meal.     2. Check urine ketones when you wake up every morning for 7 days. If negative everyday, reduce testing to once a week.    3. Follow the recommended meal plan: eat something every 2-3 hours, include protein/fat and carbohydrate at every meal and snack, have 2-3 carbs at breakfast, 3-4 carbs at lunch, 3-4 carbs at supper, 1-2 carbs at 3 snacks per day.     4. Add activity to every day, try walking or being active after each meal to help control blood sugar levels.    5.Call or e-mail educator if 3 or more blood sugars are above goal in 1 week. Call or e-mail with questions or concerns.    CALL US ON Friday MORNING WITH YOUR BLOOD SUGARS    Here are some ideas for breakfast or bedtime snack. Pick a carbohydrate from the right and a protein from the left. If you have carbohydrates 30 grams of total carbohydrate and 3-5 grams of fiber that is even better.   Fruits are not listed as they can cause the blood sugar to raise blood sugar quickly and be used up early in the night. Fruits are better at meals, or morning or afternoon snack.     Protein/Fat list (about 14 grams of protein or 2 fat servings) Carbohydrate list (about 30 grams of carbohydrate)   2 slices of cheese English Muffin   2 TBS Peanut butter/Squire butter/Sun butter 10 crackers     cup Cottage cheese 2% 2 pieces of toast   2 oz any cooked meat - less than deck of cards size 1 hamburger or hot dog bun   1.5 oz of soy nuts 1 whole wheat alix   Avocado or guacamole 6 marisol cracker squares     cup tuna - can add mayonnaise 1 cup full fat ice cream - no candy or sauce   2 eggs - boiled, poached, fried, scrambled, omelet 30 chips   1 veggie jake (might have carbohydrates also) Greek yogurt - 30 grams of carbohydrate   2 TBS Coconut 2 - 6 inch tortillas corn or flour   12 shrimp - not breaded 2 toaster waffles - no syrup   2-1oz meatballs   bagel   2 Tbs cream  cheese - plain, veggie, salmon - no fruit or honey. 6 cups popcorn - unsweetened     cup of nuts Granola bar of 3o grams of carbohydrate   10 olives 1 cup of unsweetened lentils or beans.    Tofu or Temph 4-5oz 1 cup potato salad     You can always add vegetables with dip, salad dressing or salsa also.           [facility] for the [facility]:  For Your Diabetes Education or Nutrition Appointments Call:  [phone]   For Diabetes Education and Nutrition Related Questions:   Phone: [phone]  E-mail: [email or web address]  Fax: [phone]   If you need a medication refill please contact your pharmacy. Please allow 3 business days for your refills to be completed.

## 2017-09-20 NOTE — MR AVS SNAPSHOT
After Visit Summary   9/20/2017    Paulo Lema    MRN: [id number]           Patient Information     Date Of Birth          1990        Visit Information        Provider Department      9/20/2017 3:15 PM Disha Tsang MD [facility]        Today's Diagnoses     Diet controlled gestational diabetes mellitus (GDM) in third trimester    -  1       Follow-ups after your visit        Your next 10 appointments already scheduled     Oct 04, 2017  3:30 PM CDT   [facility] Disha Tsang [facility]   [facility]    [address] [place]  [facility] [address]   [phone]            Oct 18, 2017  3:30 PM CDT   ESTABLISHED PRENATAL with Disha Tsang MD   [facility]    [address]  [facility] [address]   [phone]            Oct 25, 2017  3:45 PM CDT   ESTABLISHED PRENATAL with Disha Tsang MD   [facility]    [address]  [facility] [address]   [phone]            Nov 01, 2017  3:30 PM CDT   ESTABLISHED PRENATAL with Disha Tsang MD   [facility]    [address]  [facility] [address]   [phone]            Nov 08, 2017  3:45 PM CST   ESTABLISHED PRENATAL with Disha Tsang MD   [facility]    [address]  [facility] [address]   [phone]            Nov 15, 2017  3:45 PM CST   ESTABLISHED PRENATAL with Disha Tsang MD   [facility]    [address]  [facility] [address]   [phone]              Who to contact     If you have questions or need follow up information about today's clinic visit or your schedule please contact [facility] directly at [phone].  Normal or  "non-critical lab and imaging results will be communicated to you by MyChart, letter or phone within 4 business days after the clinic has received the results. If you do not hear from us within 7 days, please contact the clinic through [email or web address] or phone. If you have a critical or abnormal lab result, we will notify you by phone as soon as possible.  Submit refill requests through [employer] or call your pharmacy and they will forward the refill request to us. Please allow 3 business days for your refill to be completed.          Additional Information About Your Visit        [employer] Information     [employer] lets you send messages to your doctor, view your test results, renew your prescriptions, schedule appointments and more. To sign up, go to [email or web address] . Click on \"Log in\" on the left side of the screen, which will take you to the Welcome page. Then click on \"Sign up Now\" on the right side of the page.     You will be asked to enter the access code listed below, as well as some personal information. Please follow the directions to create your username and password.     Your access code is: [email or web address]  Expires: 10/12/2017 10:27 AM     Your access code will  in 90 days. If you need help or a new code, please call your [place] clinic or [phone].        Care EveryWhere ID     This is your Care EveryWhere ID. This could be used by other organizations to access your [place] medical records  [id number]        Your Vitals Were     Pulse Height Last Period Breastfeeding? BMI (Body Mass Index)       72 5' 3\" (1.6 m) 02/10/2017 (Exact Date) No 34.9 kg/m2        Blood Pressure from Last 3 Encounters:   17 122/74   17 116/68   08/10/17 124/60    Weight from Last 3 Encounters:   17 197 lb (89.4 kg)   17 196 lb 14.4 oz (89.3 kg)   08/10/17 194 lb (88 kg)              Today, you had the following     No orders found for display       Primary Care Provider    Physician No Ref-Primary "       No address on file        Equal Access to Services     [facility] JENNIFER : Hadii aad ku hadfabian Martínez, wamarada luqamor, qaybta kaalangela vidafernandoshahram, waxeliceo mari jomaximkalin soriano. So [facility] [phone].    ATENCIÓN: Si habla español, tiene a golden disposición servicios gratuitos de asistencia lingüística. Llame al [phone].    We comply with applicable federal civil rights laws and [place] laws. We do not discriminate on the basis of race, color, national origin, age, disability sex, sexual orientation or gender identity.            Thank you!     Thank you for choosing [facility]  for your care. Our goal is always to provide you with excellent care. Hearing back from our patients is one way we can continue to improve our services. Please take a few minutes to complete the written survey that you may receive in the mail after your visit with us. Thank you!             Your Updated Medication List - Protect others around you: Learn how to safely use, store and throw away your medicines at www.disposemymeds.org.          This list is accurate as of: 9/20/17  4:13 PM.  Always use your most recent med list.                   Brand Name Dispense Instructions for use Diagnosis    acetone (Urine) test Strp     100 each    Test once daily x1 week, then reduce to once weekly if consistently negative    Gestational diabetes       DACIA CONTOUR NEXT test strip   Generic drug:  blood glucose monitoring     300 strip    USE TO TEST BLOOD SUGAR FOUR TIMES DAILY OR AS DIRECTED    Gestational diabetes       blood glucose monitoring lancets     100 each    Use to test blood sugar 4 times daily or as directed.    Gestational diabetes       prenatal multivitamin plus iron 27-0.8 MG Tabs per tablet      Take 1 tablet by mouth daily

## 2017-09-21 ENCOUNTER — TELEPHONE (OUTPATIENT)
Dept: EDUCATION SERVICES | Facility: CLINIC | Age: 27
End: 2017-09-21

## 2017-09-21 ENCOUNTER — TELEPHONE (OUTPATIENT)
Dept: ULTRASOUND IMAGING | Facility: CLINIC | Age: 27
End: 2017-09-21

## 2017-09-21 DIAGNOSIS — O24.419 GESTATIONAL DIABETES: Primary | ICD-10-CM

## 2017-09-21 NOTE — TELEPHONE ENCOUNTER
Order received from provider for insulin start:      Emailed Paulo:   Nancy,  I appreciate your quick reply. It makes me feel more at ease!!   Let me know if and when you talk to my doctor and when I can  the insulin.  My pharamcy is [facility] in [place].     Thank you,  Paulo Bates,     Just to confirm, doctor has given the order to start NPH insulin.   Please start with 2 units of NPH at bedtime.     I sent the prescription to the [facility]     If you can please send in numbers Monday after breakfast, we ll see how it s working and go from there.  Have a nice weekend!   Nancy

## 2017-09-21 NOTE — TELEPHONE ENCOUNTER
Paulo emailed today to report ongoing high BG. Note only 44% fasting in goal at appointment 9/20:   Hi,  My name is Paulo Lema and my date of birth is [birth year].   I had a gestational diabetes meeting on Monday, September 11, 2017. I started testing my blood sugar that afternoon. Yesterday (Wednesday, September 20) I met with a dietitian to go over my blood sugar numbers. After testing my blood sugar for 9 days, some of my fasting numbers were too high (96, 99, 97, 97, and 96).  The dietitian I met with yesterday told me to start waking up at 2:00 am and eating 3 peanut butter sandwich crackers. I did that last night/this morning. When I tested my blood sugar this morning it was 125. It has never been that high right when I wake up. The dietitian I met with yesterday told me to email my results on Friday (September 22) but with 125 this morning before breakfast I am concerned about this number.  Do I wake up again at 2:00 am tonight/tomorrow morning to eat the three peanut butter sandwich crackers like she suggested or do I skip it since it made my blood sugar so high this morning?  Thank you,  Paulo    Email reply:   Dave Bates,   Thank you so much for the update!   It s great that you gave the overnight snack a try!  It just sounds like that isn t what your body was looking for.   Those morning numbers are always the most challenging, powerful mom hormones ;)  Not unusual to need a little help from medicine- lots of moms say it s a relief to see the morning number lower if they start some insulin.   I know you talked about it with Yvette yesterday, it makes sense to ask the doc if we can get you started with a little dose of NPH insulin at bedtime and get the day off to a healthy start!   I will contact the doctor and let you know what I hear.   Thanks Paulo!  Best,   Nancy

## 2017-09-21 NOTE — TELEPHONE ENCOUNTER
FYI  31w5d  Patient calling: states will be starting insulin and was told would need BPP 's 2 x a week.  Referral to [facility] entered.  Sheeba Peter RN

## 2017-09-22 ENCOUNTER — TELEPHONE (OUTPATIENT)
Dept: EDUCATION SERVICES | Facility: CLINIC | Age: 27
End: 2017-09-22

## 2017-09-22 NOTE — TELEPHONE ENCOUNTER
Paulo emailed, went to [facility] who told her she needed vial and syringe per insurance.   Called Paulo to remind that she has a free voucher for the pens, and needles would be cheap- to free (per pharmacy).    A 5 pack of pens at 2 units/day currently will last through the pregnancy.  She will go  free pens and get pen needles.

## 2017-09-25 ENCOUNTER — TELEPHONE (OUTPATIENT)
Dept: EDUCATION SERVICES | Facility: CLINIC | Age: 27
End: 2017-09-25

## 2017-09-25 NOTE — TELEPHONE ENCOUNTER
"Gestational Diabetes Follow-up    Subjective/Objective:    Paulo Lema sent in blood glucose log for review. Last date of communication was: 9/22/17.    Gestational diabetes is being managed with 0-0-0-2 Humulin      Estimated Date of Delivery: Nov 18, 2017    BG/Food Log:   Email from patient:  Good morning Nancy,    I started 2 units of insulin on Friday, September 22 around 9:30pm after eating my bedtime snack of 3 peanut butter sandwich crackers and an Activia yogurt. On Saturday morning I took my blood sugar at 7:30am when I woke up and it was 101. Saturday night I gave myself 2 units of insulin again around 9:30pm after having my bedtime snack of the same thing (3 sandwich cracker squares and an Activia yogurt). Saw morning I took my blood sugar when I woke up at 7:30am and it was 99. Sunday night I had the same bedtime snack and gave myself 2 units of insulin at 9:30pm and this morning when I woke up at 5:30am my blood sugar was 90.     Do you think my blood sugar was high (over 95) on Saturday and Sunday morning because I woke up 2 hours later than usual and that was \"fasting\" for too long?     I am not sure but am glad this morning my blood sugar was under 95! Let me know what you think.    Thank you,  Paulo      Assessment:  Activia yogurt- 16g carb  [place] crackers- 12g carb    Fasting BGs trending down since starting insulin on 9/21/17, for a total of 3 nights with insulin.  NPH insulin will peak in patient from 01:30-07:30.  Patients evening snack is lower in protein than the recommended 14g.      Plan/Response:  No insulin changes recommended today due to improving blood sugars.  Would like at least 5 days of data when insulin is on board before making any adjustments.  Patient to send in full records 9/27/17.    Email to patient:  Dave Bates,    I am another educator on the team working with Nancy to help with emails.  Thank you for sending in your information and questions.  When people are " started on NPH insulin it usually takes at least 3 days for the blood sugars to level out.  When you take it at 9:30pm it works the hardest from about 1:30am -7:30am, so it is fine if you check it between 5:30am to 7:30am.  It will also be in your body for a total of 18 hours, or until 3:30pm the next day.    Overall since starting the insulin it has definitely brought down your morning readings into the goal range, which is wonderful!  I think your bedtime snack is fine, but it is a little low on protein.  Would you be open to trying a different yogurt with at least 10-15g protein with 15g carbohydrates?  Or add 1 Tablespoon of peanut butter to your crackers?  The additional protein overnight keeps the blood sugars a bit more even overnight.    Could you send in your full blood sugar records on Wednesday?  Sooner if you are having elevated readings after meals as well.    Thanks!      Risa Beckford MS, BRITTNY, LAURA, CDE  [facility]  Diabetes & Nutrition Care  [email or web address]  Ph: [phone]    Risa Beckford MS, RD, LD, CDE    Any diabetes medication dose changes were made via the CDE Protocol and Collaborative Practice Agreement with the patient's referring provider.

## 2017-09-25 NOTE — TELEPHONE ENCOUNTER
Email response from patient:  Dave Lord,    I will send in my blood sugar numbers on Wedenesday. The crackers I am eating at my bedtime snack already have peanut butter in them so do you still want me to add 1 tablespoon of peanut butter to them? I certainly can add more! Also, I can look into getting different yogurt! Do you know what brands would have more protein at the top of you head?     Thank you!  Paulo    Email to patient:    Dave Bates,    Great questions!  I'd check the cracker label for the total protein grams you are getting per serving.  Usually it's not a lot.  If it's less than 7 grams, add 1 Tablespoon to add an additional 7 grams protein.  Then you can stick with your activita.    Yogurts with higher protein are Greek or strained yogurts.  Dannon Oikos or Siggis are good options that have individual containers.  These brands have about 16-20g carb and 11-15g protein.  There might be other good brands too, you'll just have to check the lables...new products come out all the time :)       Risa Beckford MS, RD, LD, CDE  [facility]  Diabetes & Nutrition Care  [email or web address]  Ph: [phone]

## 2017-09-25 NOTE — TELEPHONE ENCOUNTER
Email from patient:  Zaira thank you so much! I will check in again on Wednesday. Thank you for all the help.

## 2017-09-26 ENCOUNTER — HOSPITAL ENCOUNTER (OUTPATIENT)
Dept: ULTRASOUND IMAGING | Facility: CLINIC | Age: 27
Discharge: HOME | End: 2017-09-26
Attending: OBSTETRICS & GYNECOLOGY | Admitting: OBSTETRICS & GYNECOLOGY
Payer: COMMERCIAL

## 2017-09-26 ENCOUNTER — PRE VISIT (OUTPATIENT)
Dept: MATERNAL FETAL MEDICINE | Facility: CLINIC | Age: 27
End: 2017-09-26

## 2017-09-26 ENCOUNTER — OFFICE VISIT (OUTPATIENT)
Dept: MATERNAL FETAL MEDICINE | Facility: CLINIC | Age: 27
End: 2017-09-26
Attending: OBSTETRICS & GYNECOLOGY
Payer: COMMERCIAL

## 2017-09-26 DIAGNOSIS — O24.414 INSULIN CONTROLLED GESTATIONAL DIABETES MELLITUS (GDM) IN THIRD TRIMESTER: Primary | ICD-10-CM

## 2017-09-26 DIAGNOSIS — O26.90 PREGNANCY RELATED CONDITION, UNSPECIFIED TRIMESTER: ICD-10-CM

## 2017-09-26 PROCEDURE — 76819 FETAL BIOPHYS PROFIL W/O NST: CPT

## 2017-09-26 NOTE — PROGRESS NOTES
"Please see \"Imaging\" tab under \"Chart Review\" for details of today's US.      Luh Gonzalez, DO  Maternal-Fetal Medicine        "

## 2017-09-26 NOTE — MR AVS SNAPSHOT
After Visit Summary   9/26/2017    Paulo eLma    MRN: [id number]           Patient Information     Date Of Birth          1990        Visit Information        Provider Department      9/26/2017 8:30 AM Luh Gonzalez, DO [facility] Maternal Fetal Medicine [facility]        Today's Diagnoses     Insulin controlled gestational diabetes mellitus (GDM) in third trimester    -  1       Follow-ups after your visit        Your next 10 appointments already scheduled     Sep 29, 2017  3:30 PM CDT   MFM BPP SINGLE with RHMFMUSR1   [facility] Maternal Fetal Medicine Ultrasound - [facility] ([facility])    [address]  [facility] [address]   [phone]            Sep 29, 2017  4:00 PM CDT   [facility]   [facility] Maternal Fetal Medicine [facility])    [address]  [facility] [address]   [phone]           Please arrive at the time given for your first appointment. This visit is used internally to schedule the physician's time during your ultrasound.            Oct 03, 2017  8:45 AM CDT   MFM US COMP with MFMUSR1   [facility] Maternal Fetal Medicine Ultrasound - [facility] ([facility])    [address]  [facility] [address]   [phone]           Wear comfortable clothes and leave your valuables at home.            Oct 03, 2017  9:15 AM CDT   [facility]   [facility] Fetal Medicine [facility] ([facility])    [address]  [facility] [address]   [phone]           Please arrive at the time given for your first appointment. This visit is used internally to schedule the physician's time during your ultrasound.            Oct 03, 2017  9:45 AM CDT   ESTABLISHED PRENATAL with Lisset Joaquin,    [facility]    [address]  [facility] [address]   [phone]             Oct 06, 2017  3:00 PM CDT   MFM BPP SINGLE with RHMFMUSR1   [facility] Maternal Fetal Medicine Ultrasound - [facility] ([facility])    [address]  [facility] [address]   [phone]            Oct 06, 2017  3:30 PM CDT   Radiology MD with RH VAUGHN BAUTISTA   [facility] Maternal Fetal Medicine [facility] ([facility])    [address]  [facility] [address] [phone]           Please arrive at the time given for your first appointment. This visit is used internally to schedule the physician's time during your ultrasound.            Oct 17, 2017  9:45 AM CDT   ESTABLISHED PRENATAL with Disha Tsang MD   [facility]    [address]  [facility] [address]   [phone]            Oct 24, 2017 11:15 AM CDT   ESTABLISHED PRENATAL with Disha Tsang MD   [facility]    [address]  [facility] [address]   [phone]            Oct 31, 2017  9:15 AM CDT   ESTABLISHED PRENATAL with Disha Tsang MD   [facility] ([facility])    [address]  [facility] [address]   [phone]              Future tests that were ordered for you today     Open Future Orders        Priority Expected Expires Ordered    [facility] US Comprehensive Single Routine 10/3/2017 7/26/2018 9/26/2017    [facility] BPP Single Routine 10/6/2017 7/26/2018 9/26/2017    MF BPP Single Routine 10/10/2017 7/26/2018 9/26/2017    MF BPP Single Routine 10/13/2017 7/26/2018 9/26/2017            Who to contact     If you have questions or need follow up information about today's clinic visit or your schedule please contact [facility] MATERNAL FETAL MEDICINE [facility] directly at [phone].  Normal or non-critical lab and imaging results will be communicated to you by MyChart, letter or phone within 4 business days after the clinic has  "received the results. If you do not hear from us within 7 days, please contact the clinic through [employer] or phone. If you have a critical or abnormal lab result, we will notify you by phone as soon as possible.  Submit refill requests through [employer] or call your pharmacy and they will forward the refill request to us. Please allow 3 business days for your refill to be completed.          Additional Information About Your Visit        [employer] Information     [employer] lets you send messages to your doctor, view your test results, renew your prescriptions, schedule appointments and more. To sign up, go to [email or web address] . Click on \"Log in\" on the left side of the screen, which will take you to the Welcome page. Then click on \"Sign up Now\" on the right side of the page.     You will be asked to enter the access code listed below, as well as some personal information. Please follow the directions to create your username and password.     Your access code is: [email or web address]  Expires: 10/12/2017 10:27 AM     Your access code will  in 90 days. If you need help or a new code, please call your [place] clinic or [phone].        Care EveryWhere ID     This is your Care EveryWhere ID. This could be used by other organizations to access your [place] medical records  [id number]        Your Vitals Were     Last Period                   02/10/2017 (Exact Date)            Blood Pressure from Last 3 Encounters:   17 122/74   17 116/68   08/10/17 124/60    Weight from Last 3 Encounters:   17 89.4 kg (197 lb)   17 89.3 kg (196 lb 14.4 oz)   08/10/17 88 kg (194 lb)               Primary Care Provider    Physician No Ref-Primary       No address on file        Equal Access to Services     LINDSAY RODRIGUEZ AH: Anel Martínez, tova kesy, ericka kagiovanny meek, sidra soriano. So [facility] [phone].    ATENCIÓN: Si massiel espmaximo, eliana a golden " disposición servicios gratuitos de asistencia lingüística. Kristine mtz [phone].    We comply with applicable federal civil rights laws and [place] laws. We do not discriminate on the basis of race, color, national origin, age, disability sex, sexual orientation or gender identity.            Thank you!     Thank you for choosing [facility]  for your care. Our goal is always to provide you with excellent care. Hearing back from our patients is one way we can continue to improve our services. Please take a few minutes to complete the written survey that you may receive in the mail after your visit with us. Thank you!             Your Updated Medication List - Protect others around you: Learn how to safely use, store and throw away your medicines at www.disposemymeds.org.          This list is accurate as of: 9/26/17  9:10 AM.  Always use your most recent med list.                   Brand Name Dispense Instructions for use Diagnosis    acetone (Urine) test Strp     100 each    Test once daily x1 week, then reduce to once weekly if consistently negative    Gestational diabetes       DACIA CONTOUR NEXT test strip   Generic drug:  blood glucose monitoring     300 strip    USE TO TEST BLOOD SUGAR FOUR TIMES DAILY OR AS DIRECTED    Gestational diabetes       blood glucose monitoring lancets     100 each    Use to test blood sugar 4 times daily or as directed.    Gestational diabetes       insulin isophane human 100 UNIT/ML injection    HumuLIN N PEN    30 mL    Inject  2 units before bed every day.    Gestational diabetes       insulin pen needle 32G X 4 MM    INSUPEN PEN NEEDLES    100 each    Use 1 pen needle daily or as directed.    Gestational diabetes       prenatal multivitamin plus iron 27-0.8 MG Tabs per tablet      Take 1 tablet by mouth daily

## 2017-09-27 ENCOUNTER — TELEPHONE (OUTPATIENT)
Dept: EDUCATION SERVICES | Facility: CLINIC | Age: 27
End: 2017-09-27

## 2017-09-27 NOTE — TELEPHONE ENCOUNTER
Gestational Diabetes Follow-up    Subjective/Objective:    Paulo Lema sent in blood glucose log for review. Last date of communication was: 9/25/17.    Gestational diabetes is being managed with Humulin/Novolin NPH Insulin 2 units at bedtime    Estimated Date of Delivery: Nov 18, 2017    BG/Food Log:   Hi,    My name is Paulo Lema and my YOB: 1990.     I am here to update you on my blood sugar levels since starting 2 units of insulin on Friday, September 22.    On Saturday (9/23) my blood sugars were  before breakfast: 101  after breakfast: 139  after lunch: 145  after supper: 98      On Sunday (9/24) my blood sugars were  before breakfast: 99  after breakfast: 145  after lunch: 118  after supper: 100    On Monday (9/25) my blood sugars were   before breakfast: 90  after breakfast: 130  after lunch: 119  after supper: 107    On Tuesday (9/26) my blood sugars were   before breakfast: 89  after breakfast: 123  after lunch: 119  after supper:  91    On Wednesday (9/27) my blood sugars were  before breakfast: 89  after breakfast: 138    Thank you!   Paulo    Assessment:  Blood sugars improved overall 2 days after starting insulin.  After breakfast readings are trending toward the upper end of the goal range.    Ketones:not reported.   Fasting blood glucoses: 60% in target.  After breakfast: 80% in target.  After lunch: 80% in target.  After dinner: 100% in target.    Plan/Response:  Check ketones every morning until negative for 7 days in a row.  Keep a food record for the next follow-up.  No changes in the patient's current treatment plan.  Follow-up on Monday (10/2/17) or sooner if 3 or more readings are above goal.    Email response to patient:  Hi Paulo,    Thank you for sending in your readings!  It looks like just the 2 units of insulin helped bring down your blood sugars!  Let s stick with the same dose and have you follow up on Monday 10/2/17.      Your after breakfast readings are  trending toward 140-145.  Would you be willing to record what you are having for breakfast and lunch and send it in with your logbook?  Maybe we can make a few tweaks?    If it s easier, you can just take a picture of your log book, and send it in an email too rather than typing out your readings J    Thanks!      Risa Beckford MS, RD, LD, CDE  [facility]  Diabetes & Nutrition Care  [email or web address]  Ph: [phone]    Risa Beckford    Any diabetes medication dose changes were made via the CDE Protocol and Collaborative Practice Agreement with the patient's referring provider.

## 2017-09-27 NOTE — TELEPHONE ENCOUNTER
Email response from patient:    Sounds good. I will update you on Monday!     For breakfast, I have the same thing every day and I am getting sick of it.      English muffin with peanut butter and 1 Activia yogurt. Do you have any other suggestions of breakfast ideas?      Thank you!   Paulo    Email response to patient:    Dave Bates,    That s an excellent breakfast, but if you are sick of it we should find some other options.  Here s a simple 2 carb breakfast chart for some quick ideas, but I d still recommend trying to have the fruit as snacks vs. with breakfast.  During pregnancy the growth hormones are strong in the morning and we usually see higher after breakfast readings when people eat fruit at breakfast vs. with snacks.    Besides the chart below here s some other options:  -English muffin sandwich with (1-2 eggs, cheese, ham/sausage) and switch up the cheese flavors  -Try different kinds of yogurt for variety  -Breakfast egg scramble using corn/peas/potatoes as your carb, then add in whatever cheese or veggies you like  -1/2 cup oatmeal with milk, nuts, peanut butter, and spices (cinnamon, all spice, nutmeg etc)  -Whole wheat alix or Whole wheat waffle   -[place] Islander toast made with whole wheat bread, spread peanut butter on top and only a small amount of jam or syrup.  These can be made ahead of time then just take 1-2 pieces out and reheat in a [facility]    Sample 2 Carb Breakfast Choices- Carbohydrate choices found in (  )   1/2 cup cooked cereal  (1)  1 tablespoon raisins (1)  2 tablespoons of nuts (0)      1 whole grain English muffin (2)    1 tablespoon of peanut butter (0)  6 oz yogurt (1)  1 cup berries (1)    1 hard-boiled egg (0) 1 slice whole grain toast (1) with 1 tsp butter (0)  1 poached egg (0)    banana (1)   8 oz milk (1) +  1 packet of no-sugar-added instant breakfast mix (1) 2 slices whole grain toast (2)   1 scrambled egg (0)  1 slice cheese (0) 1 piece whole grain toast (1)    cup breakfast  potatoes (1)  1 boiled egg (0)  Vegetables (0)   1 cup skim milk (1)     medium banana (1)  1 Tbsp peanut butter (0)

## 2017-09-29 ENCOUNTER — OFFICE VISIT (OUTPATIENT)
Dept: MATERNAL FETAL MEDICINE | Facility: CLINIC | Age: 27
End: 2017-09-29
Attending: OBSTETRICS & GYNECOLOGY
Payer: COMMERCIAL

## 2017-09-29 ENCOUNTER — HOSPITAL ENCOUNTER (OUTPATIENT)
Dept: ULTRASOUND IMAGING | Facility: CLINIC | Age: 27
Discharge: HOME | End: 2017-09-29
Attending: OBSTETRICS & GYNECOLOGY | Admitting: OBSTETRICS & GYNECOLOGY
Payer: COMMERCIAL

## 2017-09-29 DIAGNOSIS — O24.414 INSULIN CONTROLLED GESTATIONAL DIABETES MELLITUS (GDM) IN THIRD TRIMESTER: Primary | ICD-10-CM

## 2017-09-29 DIAGNOSIS — O26.90 PREGNANCY RELATED CONDITION, UNSPECIFIED TRIMESTER: ICD-10-CM

## 2017-09-29 PROCEDURE — 76819 FETAL BIOPHYS PROFIL W/O NST: CPT

## 2017-09-29 NOTE — MR AVS SNAPSHOT
After Visit Summary   9/29/2017    Paulo Lema    MRN: [id number]           Patient Information     Date Of Birth          1990        Visit Information        Provider Department      9/29/2017 4:00 PM Sindy Alvarez MD [facility] Maternal Fetal Medicine [facility]        Today's Diagnoses     Insulin controlled gestational diabetes mellitus (GDM) in third trimester    -  1       Follow-ups after your visit        Your next 10 appointments already scheduled     Oct 03, 2017  8:45 AM CDT   MFM US COMP with [id number]   [facility] Maternal Fetal Medicine Ultrasound - [facility] ([facility])    [address]  [facility] [address]   [phone]           Wear comfortable clothes and leave your valuables at home.            Oct 03, 2017  9:15 AM CDT   Radiology MD with CORRY MENDES MD   [facility] Maternal Fetal Medicine [facility] ([facility])    [address]  [facility] [address]   [phone]           Please arrive at the time given for your first appointment. This visit is used internally to schedule the physician's time during your ultrasound.            Oct 03, 2017  9:45 AM CDT   ESTABLISHED PRENATAL with Lisset Joaquin,    [facility]    [address] [place]  [facility] [address]   [phone]            Oct 06, 2017  3:00 PM CDT   MFM BPP SINGLE with [id number]   [facility] Maternal Fetal Medicine Ultrasound - [facility] ([facility])    [address]  [facility] [address]   [phone]            Oct 06, 2017  3:30 PM CDT   Radiology MD with CORRY MENDES MD   [facility] Maternal Fetal Medicine [facility] ([facility])    [address]  [facility] [address]   [phone]           Please arrive at the time given for your first appointment. This visit is used internally to schedule the physician's time during your ultrasound.             Oct 10, 2017  8:45 AM CDT   MFM BPP SINGLE with [id number]   [facility] Maternal Fetal Medicine Ultrasound - [facility] ([facility])    [address]  [facility] [address]   [phone]            Oct 10, 2017  9:15 AM CDT   Radiology MD with RH VAUGHN BAUTISTA   [facility] ([facility])    [address] [facility] [address]  [facility] [address]   [phone]           Please arrive at the time given for your first appointment. This visit is used internally to schedule the physician's time during your ultrasound.            Oct 13, 2017  3:00 PM CDT   MFM BPP SINGLE with RHMFMUSR3   [facility] Fetal [facility] Ultrasound [facility] ([facility])    [address] [facility] [address]  [facility] [address]   [phone]            Oct 13, 2017  3:30 PM CDT   Radiology MD with  VAUGHN BAUTISTA   [facility] ([facility])    [address] [facility] [address]  [facility] [address]   [phone]           Please arrive at the time given for your first appointment. This visit is used internally to schedule the physician's time during your ultrasound.            Oct 17, 2017  9:45 AM CDT   ESTABLISHED PRENATAL with Disha Tsang MD   [facility] ([facility])    [address] [facility] [address]   [phone]              Who to contact     If you have questions or need follow up information about today's clinic visit or your schedule please contact [facility] FETAL [facility] directly at [phone].  Normal or non-critical lab and imaging results will be communicated to you by MyChart, letter or phone within 4 business days after the clinic has received the results. If you do not hear from us within 7 days, please contact the clinic through MyChart or phone. If you have a critical or abnormal lab result, we will notify you by phone as  "soon as possible.  Submit refill requests through [employer] or call your pharmacy and they will forward the refill request to us. Please allow 3 business days for your refill to be completed.          Additional Information About Your Visit        [employer] Information     [employer] lets you send messages to your doctor, view your test results, renew your prescriptions, schedule appointments and more. To sign up, go to [email or web address] . Click on \"Log in\" on the left side of the screen, which will take you to the Welcome page. Then click on \"Sign up Now\" on the right side of the page.     You will be asked to enter the access code listed below, as well as some personal information. Please follow the directions to create your username and password.     Your access code is: [email or web address]  Expires: 10/12/2017 10:27 AM     Your access code will  in 90 days. If you need help or a new code, please call your [place] clinic or [phone].        Care EveryWhere ID     This is your Care EveryWhere ID. This could be used by other organizations to access your [place] medical records  [id number]        Your Vitals Were     Last Period                   02/10/2017 (Exact Date)            Blood Pressure from Last 3 Encounters:   17 122/74   17 116/68   08/10/17 124/60    Weight from Last 3 Encounters:   17 89.4 kg (197 lb)   17 89.3 kg (196 lb 14.4 oz)   08/10/17 88 kg (194 lb)              Today, you had the following     No orders found for display       Primary Care Provider Fax #    Physician No Ref-Primary [phone]       No address on file        Equal Access to Services     CARROL RODRIGUEZ : Hadii fátima Martínez, tova keys, qaybjessica arguetaalsidra cabezas. So [facility] [phone].    ATENCIÓN: Si habla español, tiene a golden disposición servicios gratuitos de asistencia lingüística. Llame al [phone].    We comply with applicable federal " civil rights laws and [place] laws. We do not discriminate on the basis of race, color, national origin, age, disability, sex, sexual orientation, or gender identity.            Thank you!     Thank you for choosing [facility]  for your care. Our goal is always to provide you with excellent care. Hearing back from our patients is one way we can continue to improve our services. Please take a few minutes to complete the written survey that you may receive in the mail after your visit with us. Thank you!             Your Updated Medication List - Protect others around you: Learn how to safely use, store and throw away your medicines at www.disposemymeds.org.          This list is accurate as of: 9/29/17  4:39 PM.  Always use your most recent med list.                   Brand Name Dispense Instructions for use Diagnosis    acetone (Urine) test Strp     100 each    Test once daily x1 week, then reduce to once weekly if consistently negative    Gestational diabetes       DACIA CONTOUR NEXT test strip   Generic drug:  blood glucose monitoring     300 strip    USE TO TEST BLOOD SUGAR FOUR TIMES DAILY OR AS DIRECTED    Gestational diabetes       blood glucose monitoring lancets     100 each    Use to test blood sugar 4 times daily or as directed.    Gestational diabetes       insulin isophane human 100 UNIT/ML injection    HumuLIN N PEN    30 mL    Inject  2 units before bed every day.    Gestational diabetes       insulin pen needle 32G X 4 MM    INSUPEN PEN NEEDLES    100 each    Use 1 pen needle daily or as directed.    Gestational diabetes       prenatal multivitamin plus iron 27-0.8 MG Tabs per tablet      Take 1 tablet by mouth daily

## 2017-09-29 NOTE — PROGRESS NOTES
"Please see \"Imaging\" tab under \"Chart Review\" for details of today's visit.    Sindy Alvarez    "

## 2017-10-02 ENCOUNTER — VIRTUAL VISIT (OUTPATIENT)
Dept: EDUCATION SERVICES | Facility: CLINIC | Age: 27
End: 2017-10-02

## 2017-10-02 DIAGNOSIS — Z34.00 SUPERVISION OF NORMAL FIRST PREGNANCY, ANTEPARTUM: Primary | ICD-10-CM

## 2017-10-02 NOTE — MR AVS SNAPSHOT
After Visit Summary   10/2/2017    Paulo Lema    MRN: [id number]           Patient Information     Date Of Birth          1990        Visit Information        Provider Department      10/2/2017 9:30 AM BK DIABETIC ED RESOURCE [facility] [address]        Today's Diagnoses     Supervision of normal first pregnancy, antepartum    -  1       Follow-ups after your visit        Your next 10 appointments already scheduled     Oct 03, 2017  9:15 AM CDT   Radiology MD with Sindy Alvarez MD   [facility] Maternal Fetal Medicine [facility])    [address]  [facility] [address]   [phone]           Please arrive at the time given for your first appointment. This visit is used internally to schedule the physician's time during your ultrasound.            Oct 03, 2017  9:45 AM CDT   ESTABLISHED PRENATAL with Lisset Joaquin DO   [facility] ([facility])    [address] [place]  [facility] [address]   [phone]            Oct 06, 2017  3:00 PM CDT   MFM BPP SINGLE with RHMFMUSR1   [facility] Maternal Fetal Medicine Ultrasound - [facility])    [address]  [facility] [address]   [phone]            Oct 06, 2017  3:30 PM CDT   Radiology MD with CORRY MENDES MD   [facility] Maternal Fetal Medicine [facility])    [address]  [facility] [address]   [phone]           Please arrive at the time given for your first appointment. This visit is used internally to schedule the physician's time during your ultrasound.            Oct 10, 2017  8:45 AM CDT   MFM BPP SINGLE with RHMFMUSR1   [facility] Maternal Fetal Medicine Ultrasound - [facility])    [address]  [facility] [address]   [phone]            Oct 10, 2017  9:15 AM CDT   Radiology MD with CORRY MENDES MD   [facility] Maternal  Fetal Medicine - [facility] ([facility])    [address]  [facility] [address] [phone]           Please arrive at the time given for your first appointment. This visit is used internally to schedule the physician's time during your ultrasound.            Oct 13, 2017  3:00 PM CDT   VAUGHN BPP SINGLE with RHMFMUSR3   [facility] Maternal Fetal Medicine Ultrasound - [facility])    [address]  [facility] [address] [phone]            Oct 13, 2017  3:30 PM CDT   Radiology MD with RH VAUGHN BAUTISTA   [facility] Fetal Medicine [facility])    [address] [facility] [address]  [facility] [id number] [phone]           Please arrive at the time given for your first appointment. This visit is used internally to schedule the physician's time during your ultrasound.            Oct 17, 2017  9:45 AM CDT   ESTABLISHED PRENATAL with Disha Tsang MD   [facility]    [address] [facility] [address] [phone]            Oct 24, 2017 11:15 AM CDT   ESTABLISHED PRENATAL with Disha Tsang MD   [facility]    [address] [facility] [address] [phone]              Who to contact     If you have questions or need follow up information about today's clinic visit or your schedule please contact [facility] DONNY VINCENT directly at [phone].  Normal or non-critical lab and imaging results will be communicated to you by MyChart, letter or phone within 4 business days after the clinic has received the results. If you do not hear from us within 7 days, please contact the clinic through MyChart or phone. If you have a critical or abnormal lab result, we will notify you by phone as soon as possible.  Submit refill requests through VideoClix or call your pharmacy and they will forward the  "refill request to us. Please allow 3 business days for your refill to be completed.          Additional Information About Your Visit        MyChart Information     Scribz lets you send messages to your doctor, view your test results, renew your prescriptions, schedule appointments and more. To sign up, go to [email or web address] . Click on \"Log in\" on the left side of the screen, which will take you to the Welcome page. Then click on \"Sign up Now\" on the right side of the page.     You will be asked to enter the access code listed below, as well as some personal information. Please follow the directions to create your username and password.     Your access code is: [email or web address]  Expires: 10/12/2017 10:27 AM     Your access code will  in 90 days. If you need help or a new code, please call your [place] clinic or [phone].        Care EveryWhere ID     This is your Care EveryWhere ID. This could be used by other organizations to access your [place] medical records  [id number]        Your Vitals Were     Last Period                   02/10/2017 (Exact Date)            Blood Pressure from Last 3 Encounters:   17 122/74   17 116/68   08/10/17 124/60    Weight from Last 3 Encounters:   17 197 lb (89.4 kg)   17 196 lb 14.4 oz (89.3 kg)   08/10/17 194 lb (88 kg)              Today, you had the following     No orders found for display       Primary Care Provider Fax #    Physician No Ref-Primary [phone]       No address on file        Equal Access to Services     [facility]: Hadii aad ku hadasho Soomaali, waaxda luqadaha, qaybta kaalmada adeegtom, sidra gresham . So [facility] [phone].    ATENCIÓN: Si habla español, tiene a golden disposición servicios gratuitos de asistencia lingüística. Llame al [phone].    We comply with applicable federal civil rights laws and [place] laws. We do not discriminate on the basis of race, color, national origin, " age, disability, sex, sexual orientation, or gender identity.            Thank you!     Thank you for choosing [facility]  for your care. Our goal is always to provide you with excellent care. Hearing back from our patients is one way we can continue to improve our services. Please take a few minutes to complete the written survey that you may receive in the mail after your visit with us. Thank you!             Your Updated Medication List - Protect others around you: Learn how to safely use, store and throw away your medicines at www.disposemymeds.org.          This list is accurate as of: 10/2/17 11:59 PM.  Always use your most recent med list.                   Brand Name Dispense Instructions for use Diagnosis    acetone (Urine) test Strp     100 each    Test once daily x1 week, then reduce to once weekly if consistently negative    Gestational diabetes       DACIA CONTOUR NEXT test strip   Generic drug:  blood glucose monitoring     300 strip    USE TO TEST BLOOD SUGAR FOUR TIMES DAILY OR AS DIRECTED    Gestational diabetes       blood glucose monitoring lancets     100 each    Use to test blood sugar 4 times daily or as directed.    Gestational diabetes       insulin isophane human 100 UNIT/ML injection    HumuLIN N PEN    30 mL    Inject  2 units before bed every day.    Gestational diabetes       insulin pen needle 32G X 4 MM    INSUPEN PEN NEEDLES    100 each    Use 1 pen needle daily or as directed.    Gestational diabetes       prenatal multivitamin plus iron 27-0.8 MG Tabs per tablet      Take 1 tablet by mouth daily

## 2017-10-02 NOTE — Clinical Note
If pts fasting readings continue to remain high would recommend insulin increase beyond our protocol (1 unit q 3 days until over 10 units). Pt to send in readings again 10/5/17.  Will likely need at least 5 units per day.  Please send orders to increase greater than 20% every 3-7 days if you agree.  Risa Beckford MS, RD, LD, CDE

## 2017-10-03 ENCOUNTER — OFFICE VISIT (OUTPATIENT)
Dept: MATERNAL FETAL MEDICINE | Facility: CLINIC | Age: 27
End: 2017-10-03
Attending: OBSTETRICS & GYNECOLOGY
Payer: COMMERCIAL

## 2017-10-03 ENCOUNTER — PRENATAL OFFICE VISIT (OUTPATIENT)
Dept: OBGYN | Facility: CLINIC | Age: 27
End: 2017-10-03
Payer: COMMERCIAL

## 2017-10-03 ENCOUNTER — HOSPITAL ENCOUNTER (OUTPATIENT)
Dept: ULTRASOUND IMAGING | Facility: CLINIC | Age: 27
Discharge: HOME | End: 2017-10-03
Attending: OBSTETRICS & GYNECOLOGY | Admitting: OBSTETRICS & GYNECOLOGY
Payer: COMMERCIAL

## 2017-10-03 VITALS
DIASTOLIC BLOOD PRESSURE: 82 MMHG | SYSTOLIC BLOOD PRESSURE: 118 MMHG | WEIGHT: 198.1 LBS | HEIGHT: 63 IN | BODY MASS INDEX: 35.1 KG/M2 | HEART RATE: 88 BPM

## 2017-10-03 DIAGNOSIS — Z34.00 SUPERVISION OF NORMAL FIRST PREGNANCY, ANTEPARTUM: ICD-10-CM

## 2017-10-03 DIAGNOSIS — O24.414 INSULIN CONTROLLED GESTATIONAL DIABETES MELLITUS (GDM) IN THIRD TRIMESTER: Primary | ICD-10-CM

## 2017-10-03 DIAGNOSIS — O24.414 INSULIN CONTROLLED GESTATIONAL DIABETES MELLITUS (GDM) IN THIRD TRIMESTER: ICD-10-CM

## 2017-10-03 PROCEDURE — 99207 ZZC PRENATAL VISIT: CPT | Performed by: FAMILY MEDICINE

## 2017-10-03 PROCEDURE — 76811 OB US DETAILED SNGL FETUS: CPT

## 2017-10-03 PROCEDURE — 76819 FETAL BIOPHYS PROFIL W/O NST: CPT | Performed by: OBSTETRICS & GYNECOLOGY

## 2017-10-03 NOTE — NURSING NOTE
"Chief Complaint   Patient presents with     Prenatal Care     33+3       Initial /82  Pulse 88  Ht 5' 3\" (1.6 m)  Wt 198 lb 1.6 oz (89.9 kg)  LMP 02/10/2017 (Exact Date)  BMI 35.09 kg/m2 Estimated body mass index is 35.09 kg/(m^2) as calculated from the following:    Height as of this encounter: 5' 3\" (1.6 m).    Weight as of this encounter: 198 lb 1.6 oz (89.9 kg).  BP completed using cuff size: regular        The following HM Due: NONE      The following patient reported/Care Every where data was sent to:  P ABSTRACT QUALITY INITIATIVES [07004]  NA     patient has appointment for today    Glendy LUNA               "

## 2017-10-03 NOTE — PROGRESS NOTES
"CC: Here for routine prenatal visit   26 year old y/o  @ 33w3d with Estimated Date of Delivery: 2017   HPI: Patient has gestational diabetes. She reports she was on 2 units of insulin (humulin) at night and increased to 3 yesterday due to high fasting glucose levels. Seeing MFM twice weekly. 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 Otherwise doing well. + fetal movement. No vaginal bleeding, no LOF, no contractions.    This document serves as a record of the services and decisions personally performed and made by Lisset Joaquin DO. It was created on his/her behalf by Casey Chan, a trained medical scribe. The creation of this document is based the provider's statements to the medical scribe.  Darron Chan 9:54 AM, October 3, 2017    /82  Pulse 88  Ht 1.6 m (5' 3\")  Wt 89.9 kg (198 lb 1.6 oz)  LMP 02/10/2017 (Exact Date)  BMI 35.09 kg/m2  See OB flowsheet    1) concerns: none  2) Routine care: gestational diabetes - currently using humulin  3) Plan for induction on 17  4) Return to clinic in 2 weeks    The information in this document, created by the medical scribe for me, accurately reflects the services I personally performed and the decisions made by me. I have reviewed and approved this document for accuracy prior to leaving the patient care area.  Lisset Joaquin DO  9:56 AM, 10/03/17    Jemal    "

## 2017-10-03 NOTE — PATIENT INSTRUCTIONS
Return in 2 weeks     Dr. Lisset Joaquin,     Obstetrics and Gynecology  [facility] [place]

## 2017-10-03 NOTE — MR AVS SNAPSHOT
After Visit Summary   10/3/2017    Paulo Lema    MRN: [id number]           Patient Information     Date Of Birth          1990        Visit Information        Provider Department      10/3/2017 9:15 AM Sindy Alvarez MD [facility] Maternal Fetal Medicine [facility]        Today's Diagnoses     Insulin controlled gestational diabetes mellitus (GDM) in third trimester    -  1       Follow-ups after your visit        Your next 10 appointments already scheduled     Oct 03, 2017  9:45 AM CDT   ESTABLISHED PRENATAL with Lisset Joaquin DO   [facility]    [address] [place]  [facility] [address]   [phone]            Oct 06, 2017  3:00 PM CDT   MFM BPP SINGLE with [id number]   [facility] Maternal Fetal Medicine Ultrasound - [facility])    [address]  [facility] [address]   [phone]            Oct 06, 2017  3:30 PM CDT   Radiology MD with CORRY MENDES MD   [facility] Maternal Fetal Medicine [facility] ([facility])    [address]  [facility] [address]   [phone]           Please arrive at the time given for your first appointment. This visit is used internally to schedule the physician's time during your ultrasound.            Oct 10, 2017  8:45 AM CDT   MFM BPP SINGLE with RHMFMUSR1   [facility] Maternal Fetal Medicine Ultrasound - [facility])    [address]  [facility] [address]   [phone]            Oct 10, 2017  9:15 AM CDT   Radiology MD with OCRRY MENDES MD   [facility] Maternal Fetal Medicine [facility] ([facility])    [address]  [facility] [address]   [phone]           Please arrive at the time given for your first appointment. This visit is used internally to schedule the physician's time during your ultrasound.            Oct 13, 2017  3:00 PM CDT   MFM BPP SINGLE with RHMFMUSR3    [facility] Maternal Fetal Medicine Ultrasound - [facility] ([facility])    [address]  [facility] [address]   [phone]            Oct 13, 2017  3:30 PM CDT   Radiology MD with CORRY MENDES MD   [facility] Maternal Fetal Medicine [facility] ([facility])    [address]  [facility] [address] [phone]           Please arrive at the time given for your first appointment. This visit is used internally to schedule the physician's time during your ultrasound.            Oct 17, 2017  9:45 AM CDT   ESTABLISHED PRENATAL with Disha Tsang MD   [facility] ([facility])    [address]  [facility] [address] [phone]            Oct 24, 2017 11:15 AM CDT   ESTABLISHED PRENATAL with Disha Tsang MD   [facility] ([facility])    [address]  [facility] [address] [phone]            Oct 31, 2017  9:15 AM CDT   ESTABLISHED PRENATAL with Disha Tsang MD   [facility] ([facility])    [address]  [facility] [address] [phone]              Who to contact     If you have questions or need follow up information about today's clinic visit or your schedule please contact [facility] MATERNAL FETAL MEDICINE [facility] directly at [phone].  Normal or non-critical lab and imaging results will be communicated to you by MyChart, letter or phone within 4 business days after the clinic has received the results. If you do not hear from us within 7 days, please contact the clinic through Ocohart or phone. If you have a critical or abnormal lab result, we will notify you by phone as soon as possible.  Submit refill requests through Zolpy or call your pharmacy and they will forward the refill request to us. Please allow 3 business days for your refill to be completed.          Additional Information About Your  "Visit        Xlumenahart Information     [employer] Gateway lets you send messages to your doctor, view your test results, renew your prescriptions, schedule appointments and more. To sign up, go to [email or web address] . Click on \"Log in\" on the left side of the screen, which will take you to the Welcome page. Then click on \"Sign up Now\" on the right side of the page.     You will be asked to enter the access code listed below, as well as some personal information. Please follow the directions to create your username and password.     Your access code is: [email or web address]  Expires: 10/12/2017 10:27 AM     Your access code will  in 90 days. If you need help or a new code, please call your [place] clinic or [phone].        Care EveryWhere ID     This is your Care EveryWhere ID. This could be used by other organizations to access your [place] medical records  [id number]        Your Vitals Were     Last Period                   02/10/2017 (Exact Date)            Blood Pressure from Last 3 Encounters:   17 122/74   17 116/68   08/10/17 124/60    Weight from Last 3 Encounters:   17 89.4 kg (197 lb)   17 89.3 kg (196 lb 14.4 oz)   08/10/17 88 kg (194 lb)              Today, you had the following     No orders found for display       Primary Care Provider Fax #    Physician No Ref-Primary [phone]       No address on file        Equal Access to Services     [facility] : Hadii fátima thakur hadasho Soomaali, waaxda luqadaha, qaybta kaalmada adeegyada, sidra gresham . So [facility] [phone].    ATENCIÓN: Si habla español, tiene a golden disposición servicios gratuitos de asistencia lingüística. Llame al [phone].    We comply with applicable federal civil rights laws and [place] laws. We do not discriminate on the basis of race, color, national origin, age, disability, sex, sexual orientation, or gender identity.            Thank you!     Thank you for choosing [facility] " MATERNAL FETAL MEDICINE - [facility]  for your care. Our goal is always to provide you with excellent care. Hearing back from our patients is one way we can continue to improve our services. Please take a few minutes to complete the written survey that you may receive in the mail after your visit with us. Thank you!             Your Updated Medication List - Protect others around you: Learn how to safely use, store and throw away your medicines at www.disposemymeds.org.          This list is accurate as of: 10/3/17  9:31 AM.  Always use your most recent med list.                   Brand Name Dispense Instructions for use Diagnosis    acetone (Urine) test Strp     100 each    Test once daily x1 week, then reduce to once weekly if consistently negative    Gestational diabetes       DACIA CONTOUR NEXT test strip   Generic drug:  blood glucose monitoring     300 strip    USE TO TEST BLOOD SUGAR FOUR TIMES DAILY OR AS DIRECTED    Gestational diabetes       blood glucose monitoring lancets     100 each    Use to test blood sugar 4 times daily or as directed.    Gestational diabetes       insulin isophane human 100 UNIT/ML injection    HumuLIN N PEN    30 mL    Inject  2 units before bed every day.    Gestational diabetes       insulin pen needle 32G X 4 MM    INSUPEN PEN NEEDLES    100 each    Use 1 pen needle daily or as directed.    Gestational diabetes       prenatal multivitamin plus iron 27-0.8 MG Tabs per tablet      Take 1 tablet by mouth daily

## 2017-10-03 NOTE — MR AVS SNAPSHOT
After Visit Summary   10/3/2017    Paulo Lema    MRN: [id number]           Patient Information     Date Of Birth          1990        Visit Information        Provider Department      10/3/2017 9:45 AM Lisset Joaquin, DO [facility]        Today's Diagnoses     Supervision of normal first pregnancy, antepartum          Care Instructions    Return in 2 weeks     Dr. Lisset Joaquin, DO    Obstetrics and Gynecology  [facility] [place]                 Follow-ups after your visit        Your next 10 appointments already scheduled     Oct 06, 2017  3:00 PM CDT   MFM BPP SINGLE with [id number]   [facility] ([facility])    [address]  [facility] [address]   [phone]            Oct 06, 2017  3:30 PM CDT   Radiology MD with RH VAUGHN BAUTISTA   [facility])    [address]  [facility] [address]   [phone]           Please arrive at the time given for your first appointment. This visit is used internally to schedule the physician's time during your ultrasound.            Oct 10, 2017  8:45 AM CDT   MFM BPP SINGLE with [id number]   [facility] ([facility])    [address]  [facility] [address]   [phone]            Oct 10, 2017  9:15 AM CDT   Radiology MD with RH VAUGHN BAUTISTA   [facility] ([facility])    [address]  [facility] [address]   [phone]           Please arrive at the time given for your first appointment. This visit is used internally to schedule the physician's time during your ultrasound.            Oct 13, 2017  3:00 PM CDT   MFM BPP SINGLE with [id number]   [facility] ([facility])    [address] [facility] [address]  [facility] [address]   [phone]            Oct 13, 2017  3:30 PM CDT   Radiology MD with  VAUGHN BAUTISTA   [facility] ([facility])    [address] [facility] [address]  [facility] [address]   [phone]           Please arrive at the time given for your first appointment. This visit is used internally to schedule the physician's time during your ultrasound.            Oct 17, 2017  9:45 AM CDT   ESTABLISHED PRENATAL with Disha Tsang MD   [facility] ([facility])    [address]  [facility] [address]   [phone]            Oct 24, 2017 11:15 AM CDT   ESTABLISHED PRENATAL with Disha Tsang MD   [facility] ([facility])    [address]  [facility] [address]   [phone]            Oct 31, 2017  9:15 AM CDT   ESTABLISHED PRENATAL with Disha Tsang MD   [facility] ([facility])    [address]  [facility] [address]   [phone]            Nov 07, 2017  9:00 AM CST   ESTABLISHED PRENATAL with Disha Tsang MD   [facility] ([facility])    [address]  [facility] [address]   [phone]              Future tests that were ordered for you today     Open Future Orders        Priority Expected Expires Ordered    MFM BPP Single Routine 10/17/2017 8/3/2018 10/3/2017    MFM BPP Single Routine 10/20/2017 8/3/2018 10/3/2017            Who to contact     If you have questions or need follow up information about today's clinic visit or your schedule please contact [facility] directly at [phone].  Normal or non-critical lab and imaging results will be communicated to you by MyChart, letter or phone within 4 business days after the clinic has received the results. If you do not hear from us within 7 days, please contact the clinic  "through PackLink or phone. If you have a critical or abnormal lab result, we will notify you by phone as soon as possible.  Submit refill requests through PackLink or call your pharmacy and they will forward the refill request to us. Please allow 3 business days for your refill to be completed.          Additional Information About Your Visit        MavenharAny+Times Information     PackLink lets you send messages to your doctor, view your test results, renew your prescriptions, schedule appointments and more. To sign up, go to [email or web address] . Click on \"Log in\" on the left side of the screen, which will take you to the Welcome page. Then click on \"Sign up Now\" on the right side of the page.     You will be asked to enter the access code listed below, as well as some personal information. Please follow the directions to create your username and password.     Your access code is: [email or web address]  Expires: 10/12/2017 10:27 AM     Your access code will  in 90 days. If you need help or a new code, please call your [place] clinic or [phone].        Care EveryWhere ID     This is your Care EveryWhere ID. This could be used by other organizations to access your [place] medical records  [id number]        Your Vitals Were     Pulse Height Last Period BMI (Body Mass Index)          88 5' 3\" (1.6 m) 02/10/2017 (Exact Date) 35.09 kg/m2         Blood Pressure from Last 3 Encounters:   10/03/17 118/82   17 122/74   17 116/68    Weight from Last 3 Encounters:   10/03/17 198 lb 1.6 oz (89.9 kg)   17 197 lb (89.4 kg)   17 196 lb 14.4 oz (89.3 kg)              Today, you had the following     No orders found for display       Primary Care Provider Fax #    Physician No Ref-Primary [phone]       No address on file        Equal Access to Services     LINDSAY RODRIGUEZ : Anel Martínez, waaxda luqadaha, qaybta kaalangela meek, sidra gresham . So wa " [phone].    ATENCIÓN: Si massiel crabtree, tiene a golden disposición servicios gratuitos de asistencia lingüística. Kristine mtz [phone].    We comply with applicable federal civil rights laws and [place] laws. We do not discriminate on the basis of race, color, national origin, age, disability, sex, sexual orientation, or gender identity.            Thank you!     Thank you for choosing [facility]  for your care. Our goal is always to provide you with excellent care. Hearing back from our patients is one way we can continue to improve our services. Please take a few minutes to complete the written survey that you may receive in the mail after your visit with us. Thank you!             Your Updated Medication List - Protect others around you: Learn how to safely use, store and throw away your medicines at www.disposemymeds.org.          This list is accurate as of: 10/3/17  9:55 AM.  Always use your most recent med list.                   Brand Name Dispense Instructions for use Diagnosis    acetone (Urine) test Strp     100 each    Test once daily x1 week, then reduce to once weekly if consistently negative    Gestational diabetes       DACIA CONTOUR NEXT test strip   Generic drug:  blood glucose monitoring     300 strip    USE TO TEST BLOOD SUGAR FOUR TIMES DAILY OR AS DIRECTED    Gestational diabetes       blood glucose monitoring lancets     100 each    Use to test blood sugar 4 times daily or as directed.    Gestational diabetes       doxylamine 25 MG Tabs tablet    UNISOM     Take 25 mg by mouth At Bedtime        insulin isophane human 100 UNIT/ML injection    HumuLIN N PEN    30 mL    Inject  2 units before bed every day.    Gestational diabetes       insulin pen needle 32G X 4 MM    INSUPEN PEN NEEDLES    100 each    Use 1 pen needle daily or as directed.    Gestational diabetes       prenatal multivitamin plus iron 27-0.8 MG Tabs per tablet      Take 1 tablet by mouth daily        VITAMIN  B6 PO

## 2017-10-05 ENCOUNTER — VIRTUAL VISIT (OUTPATIENT)
Dept: EDUCATION SERVICES | Facility: CLINIC | Age: 27
End: 2017-10-05

## 2017-10-05 DIAGNOSIS — O24.414 INSULIN CONTROLLED GESTATIONAL DIABETES MELLITUS (GDM) IN THIRD TRIMESTER: ICD-10-CM

## 2017-10-05 NOTE — MR AVS SNAPSHOT
After Visit Summary   10/5/2017    Paulo Lema    MRN: [id number]           Patient Information     Date Of Birth          1990        Visit Information        Provider Department      10/5/2017 8:30 AM [facility] ED [facility]        Today's Diagnoses     Insulin controlled gestational diabetes mellitus (GDM) in third trimester           Follow-ups after your visit        Your next 10 appointments already scheduled     Oct 06, 2017  3:00 PM CDT   MFM BPP SINGLE with [id number]   [facility])    [address]  [facility] [address]   [phone]            Oct 06, 2017  3:30 PM CDT   Radiology MD with CORRY MENDES MD   [facility] Maternal Fetal Medicine [facility])    [address]  [facility] [address]   [phone]           Please arrive at the time given for your first appointment. This visit is used internally to schedule the physician's time during your ultrasound.            Oct 10, 2017  8:45 AM CDT   MFM BPP SINGLE with [id number]   [facility] ([facility])    [address]  [facility] [address]   [phone]            Oct 10, 2017  9:15 AM CDT   Radiology MD with CORRY MENDES MD   [facility] Maternal Fetal Medicine [facility])    [address]  [facility] [address]   [phone]           Please arrive at the time given for your first appointment. This visit is used internally to schedule the physician's time during your ultrasound.            Oct 13, 2017  3:00 PM CDT   MFM BPP SINGLE with [id number]   [facility])    [address]  [facility] [address]   [phone]            Oct 13, 2017  3:30 PM CDT   Radiology MD with CORRY MENDES  MD   [facility] Maternal Fetal Medicine [facility] ([facility])    [address]  [facility] [address]   [phone]           Please arrive at the time given for your first appointment. This visit is used internally to schedule the physician's time during your ultrasound.            Oct 17, 2017  9:45 AM CDT   ESTABLISHED PRENATAL with Disha Tsang MD   [facility]    [address] [place]  [facility] [address]   [phone]            Oct 17, 2017 10:15 AM CDT   MFM BPP SINGLE with [id number]   [facility] Maternal Fetal Medicine Ultrasound - [facility])    [address]  [facility] [address]   [phone]            Oct 17, 2017 10:45 AM CDT   Radiology MD with RH MICHELLE BAUTISTA   [facility] Maternal Fetal [facility])    [address]  [facility] [address]   [phone]           Please arrive at the time given for your first appointment. This visit is used internally to schedule the physician's time during your ultrasound.            Oct 20, 2017  8:00 AM CDT   MFM BPP SINGLE with [id number]   [facility] Maternal Fetal Medicine Ultrasound [facility])    [address]  [facility] [address]   [phone]              Who to contact     If you have questions or need follow up information about today's clinic visit or your schedule please contact [facility] directly at [phone].  Normal or non-critical lab and imaging results will be communicated to you by MyChart, letter or phone within 4 business days after the clinic has received the results. If you do not hear from us within 7 days, please contact the clinic through MyChart or phone. If you have a critical or abnormal lab result, we will notify you by phone as soon as possible.  Submit refill requests through [employer]Localt or call  "your pharmacy and they will forward the refill request to us. Please allow 3 business days for your refill to be completed.          Additional Information About Your Visit        MyChart Information     GeniusMatcher lets you send messages to your doctor, view your test results, renew your prescriptions, schedule appointments and more. To sign up, go to [email or web address] . Click on \"Log in\" on the left side of the screen, which will take you to the Welcome page. Then click on \"Sign up Now\" on the right side of the page.     You will be asked to enter the access code listed below, as well as some personal information. Please follow the directions to create your username and password.     Your access code is: [email or web address]  Expires: 10/12/2017 10:27 AM     Your access code will  in 90 days. If you need help or a new code, please call your [place] clinic or [phone].        Care EveryWhere ID     This is your Care EveryWhere ID. This could be used by other organizations to access your [place] medical records  [id number]        Your Vitals Were     Last Period                   02/10/2017 (Exact Date)            Blood Pressure from Last 3 Encounters:   10/03/17 118/82   17 122/74   17 116/68    Weight from Last 3 Encounters:   10/03/17 89.9 kg (198 lb 1.6 oz)   17 89.4 kg (197 lb)   17 89.3 kg (196 lb 14.4 oz)              Today, you had the following     No orders found for display         Today's Medication Changes          These changes are accurate as of: 10/5/17 11:44 AM.  If you have any questions, ask your nurse or doctor.               These medicines have changed or have updated prescriptions.        Dose/Directions    insulin isophane human 100 UNIT/ML injection   Commonly known as:  HumuLIN N PEN   This may have changed:  additional instructions   Used for:  Insulin controlled gestational diabetes mellitus (GDM) in third trimester        Inject up to 5 units before bed " every day.   Quantity:  30 mL   Refills:  1            Where to get your medicines      Some of these will need a paper prescription and others can be bought over the counter.  Ask your nurse if you have questions.     You don't need a prescription for these medications     insulin isophane human 100 UNIT/ML injection                Primary Care Provider Fax #    Physician No Ref-Primary [phone]       No address on file        Equal Access to Services     [facility] : Hadii aad ku hadasho Soomaali, waaxda luqadaha, qaybta kaalmada adeegyada, waxay adonayin hayhoracion adehilda giles lajoshgloria . So [facility] [phone].    ATENCIÓN: Si habla español, tiene a golden disposición servicios gratuitos de asistencia lingüística. Llame al [phone].    We comply with applicable federal civil rights laws and [place] laws. We do not discriminate on the basis of race, color, national origin, age, disability, sex, sexual orientation, or gender identity.            Thank you!     Thank you for choosing [facility]  for your care. Our goal is always to provide you with excellent care. Hearing back from our patients is one way we can continue to improve our services. Please take a few minutes to complete the written survey that you may receive in the mail after your visit with us. Thank you!             Your Updated Medication List - Protect others around you: Learn how to safely use, store and throw away your medicines at www.disposemymeds.org.          This list is accurate as of: 10/5/17 11:44 AM.  Always use your most recent med list.                   Brand Name Dispense Instructions for use Diagnosis    acetone (Urine) test Strp     100 each    Test once daily x1 week, then reduce to once weekly if consistently negative    Gestational diabetes       DACIA CONTOUR NEXT test strip   Generic drug:  blood glucose monitoring     300 strip    USE TO TEST BLOOD SUGAR FOUR TIMES DAILY OR AS DIRECTED    Gestational  diabetes       blood glucose monitoring lancets     100 each    Use to test blood sugar 4 times daily or as directed.    Gestational diabetes       doxylamine 25 MG Tabs tablet    UNISOM     Take 25 mg by mouth At Bedtime        insulin isophane human 100 UNIT/ML injection    HumuLIN N PEN    30 mL    Inject up to 5 units before bed every day.    Insulin controlled gestational diabetes mellitus (GDM) in third trimester       insulin pen needle 32G X 4 MM    INSUPEN PEN NEEDLES    100 each    Use 1 pen needle daily or as directed.    Gestational diabetes       prenatal multivitamin plus iron 27-0.8 MG Tabs per tablet      Take 1 tablet by mouth daily        VITAMIN B6 PO

## 2017-10-06 ENCOUNTER — OFFICE VISIT (OUTPATIENT)
Dept: MATERNAL FETAL MEDICINE | Facility: CLINIC | Age: 27
End: 2017-10-06
Attending: OBSTETRICS & GYNECOLOGY
Payer: COMMERCIAL

## 2017-10-06 ENCOUNTER — HOSPITAL ENCOUNTER (OUTPATIENT)
Dept: ULTRASOUND IMAGING | Facility: CLINIC | Age: 27
Discharge: HOME | End: 2017-10-06
Attending: OBSTETRICS & GYNECOLOGY | Admitting: OBSTETRICS & GYNECOLOGY
Payer: COMMERCIAL

## 2017-10-06 DIAGNOSIS — O24.414 INSULIN CONTROLLED GESTATIONAL DIABETES MELLITUS (GDM) IN THIRD TRIMESTER: Primary | ICD-10-CM

## 2017-10-06 DIAGNOSIS — O24.414 INSULIN CONTROLLED GESTATIONAL DIABETES MELLITUS (GDM) IN THIRD TRIMESTER: ICD-10-CM

## 2017-10-06 PROCEDURE — 76819 FETAL BIOPHYS PROFIL W/O NST: CPT

## 2017-10-06 NOTE — MR AVS SNAPSHOT
After Visit Summary   10/6/2017    Paulo Leam    MRN: [id number]           Patient Information     Date Of Birth          1990        Visit Information        Provider Department      10/6/2017 3:30 PM Luh Gonzalez DO [facility]        Today's Diagnoses     Insulin controlled gestational diabetes mellitus (GDM) in third trimester    -  1       Follow-ups after your visit        Your next 10 appointments already scheduled     Oct 06, 2017  3:30 PM CDT   Radiology MD with Luh Gonzalez DO   [facility] ([facility])    [address]  [facility] [address]   [phone]           Please arrive at the time given for your first appointment. This visit is used internally to schedule the physician's time during your ultrasound.            Oct 10, 2017  8:45 AM CDT   MFM BPP SINGLE with [id number]   [facility] ([facility])    [address]  [facility] [address]   [phone]            Oct 10, 2017  9:15 AM CDT   Radiology MD with RH VAUGHN BAUTISTA   [facility] ([facility])    [address]  [facility] [address]   [phone]           Please arrive at the time given for your first appointment. This visit is used internally to schedule the physician's time during your ultrasound.            Oct 13, 2017  3:00 PM CDT   MFM BPP SINGLE with [id number]   [facility] ([facility])    [address]  [facility] [address]   [phone]            Oct 13, 2017  3:30 PM CDT   Radiology MD with RH VAUGHN BAUTISTA   [facility] ([facility])    [address]  [facility] [address]   [phone]           Please arrive at the time given for your first  appointment. This visit is used internally to schedule the physician's time during your ultrasound.            Oct 17, 2017  9:45 AM CDT   ESTABLISHED PRENATAL with Disha Tsang MD   [facility]    [address]  [facility] [address]   [phone]            Oct 17, 2017 10:15 AM CDT   MFM BPP SINGLE with [id number]   [facility] Maternal Fetal Medicine Ultrasound - [facility] ([facility])    [address]  [facility] [address]   [phone]            Oct 17, 2017 10:45 AM CDT   Radiology MD with RH VAUGHN BAUTISTA   [facility] Maternal Fetal [facility] ([facility])    [address]  [facility] [address]   [phone]           Please arrive at the time given for your first appointment. This visit is used internally to schedule the physician's time during your ultrasound.            Oct 20, 2017  8:00 AM CDT   MFM BPP SINGLE with [id number]   [facility] Fetal Medicine Ultrasound - [facility] ([facility])    [address]  [facility] [address]   [phone]            Oct 20, 2017  8:30 AM CDT   Radiology MD with RH VAUGHN BAUTISTA   [facility] Maternal [facility] ([facility])    [address]  [facility] [address]   [phone]           Please arrive at the time given for your first appointment. This visit is used internally to schedule the physician's time during your ultrasound.              Who to contact     If you have questions or need follow up information about today's clinic visit or your schedule please contact [facility] directly at [phone].  Normal or non-critical lab and imaging results will be communicated to you by MyChart, letter or phone within 4 business days after the clinic has received the results. If you do not hear from us within 7 days, please contact the clinic  "through [employer] or phone. If you have a critical or abnormal lab result, we will notify you by phone as soon as possible.  Submit refill requests through [employer] or call your pharmacy and they will forward the refill request to us. Please allow 3 business days for your refill to be completed.          Additional Information About Your Visit        [employer] Information     [employer] lets you send messages to your doctor, view your test results, renew your prescriptions, schedule appointments and more. To sign up, go to [email or web address] . Click on \"Log in\" on the left side of the screen, which will take you to the Welcome page. Then click on \"Sign up Now\" on the right side of the page.     You will be asked to enter the access code listed below, as well as some personal information. Please follow the directions to create your username and password.     Your access code is: [email or web address]  Expires: 10/12/2017 10:27 AM     Your access code will  in 90 days. If you need help or a new code, please call your [place] clinic or [phone].        Care EveryWhere ID     This is your Care EveryWhere ID. This could be used by other organizations to access your [place] medical records  [id number]        Your Vitals Were     Last Period                   02/10/2017 (Exact Date)            Blood Pressure from Last 3 Encounters:   10/03/17 118/82   17 122/74   17 116/68    Weight from Last 3 Encounters:   10/03/17 89.9 kg (198 lb 1.6 oz)   17 89.4 kg (197 lb)   17 89.3 kg (196 lb 14.4 oz)              Today, you had the following     No orders found for display       Primary Care Provider Fax #    Physician No Ref-Primary [phone]       No address on file        Equal Access to Services     CARROL RODRIGUEZ : Anel Martínez, wamarada massimo, qaybta kaalmada fantasma, sidra soriano. So [facility] [place] [phone].    ATENCIÓN: Si habla español, tiene a golden " disposición servicios gratuitos de asistencia lingüística. Kristine mtz [phone].    We comply with applicable federal civil rights laws and [place] laws. We do not discriminate on the basis of race, color, national origin, age, disability, sex, sexual orientation, or gender identity.            Thank you!     Thank you for choosing [facility]  for your care. Our goal is always to provide you with excellent care. Hearing back from our patients is one way we can continue to improve our services. Please take a few minutes to complete the written survey that you may receive in the mail after your visit with us. Thank you!             Your Updated Medication List - Protect others around you: Learn how to safely use, store and throw away your medicines at www.disposemymeds.org.          This list is accurate as of: 10/6/17  3:12 PM.  Always use your most recent med list.                   Brand Name Dispense Instructions for use Diagnosis    acetone (Urine) test Strp     100 each    Test once daily x1 week, then reduce to once weekly if consistently negative    Gestational diabetes       DACIA CONTOUR NEXT test strip   Generic drug:  blood glucose monitoring     300 strip    USE TO TEST BLOOD SUGAR FOUR TIMES DAILY OR AS DIRECTED    Gestational diabetes       blood glucose monitoring lancets     100 each    Use to test blood sugar 4 times daily or as directed.    Gestational diabetes       doxylamine 25 MG Tabs tablet    UNISOM     Take 25 mg by mouth At Bedtime        insulin isophane human 100 UNIT/ML injection    HumuLIN N PEN    30 mL    Inject up to 5 units before bed every day.    Insulin controlled gestational diabetes mellitus (GDM) in third trimester       insulin pen needle 32G X 4 MM    INSUPEN PEN NEEDLES    100 each    Use 1 pen needle daily or as directed.    Gestational diabetes       prenatal multivitamin plus iron 27-0.8 MG Tabs per tablet      Take 1 tablet by mouth daily         VITAMIN B6 PO

## 2017-10-09 ENCOUNTER — VIRTUAL VISIT (OUTPATIENT)
Dept: EDUCATION SERVICES | Facility: CLINIC | Age: 27
End: 2017-10-09

## 2017-10-09 DIAGNOSIS — O24.414 INSULIN CONTROLLED GESTATIONAL DIABETES MELLITUS (GDM) IN THIRD TRIMESTER: ICD-10-CM

## 2017-10-09 NOTE — MR AVS SNAPSHOT
After Visit Summary   10/9/2017    Paulo Lema    MRN: [id number]           Patient Information     Date Of Birth          1990        Visit Information        Provider Department      10/9/2017 11:30 AM [facility] DIABETIC ED RESOURCE [facility]        Today's Diagnoses     Insulin controlled gestational diabetes mellitus (GDM) in third trimester           Follow-ups after your visit        Your next 10 appointments already scheduled     Oct 09, 2017 11:30 AM CDT   Telephone Visit with  DIABETIC ED RESOURCE   [facility] ([facility])    [address] [id number] [phone]           Note: this is not an onsite visit; there is no need to come to the facility.            Oct 10, 2017  8:45 AM CDT   MFM BPP SINGLE with [id number]   [facility])    [address]  [facility] [address]   [phone]            Oct 10, 2017  9:15 AM CDT   Radiology MD with CORRY MENDES MD   [facility])    [address]  [facility] [address]   [phone]           Please arrive at the time given for your first appointment. This visit is used internally to schedule the physician's time during your ultrasound.            Oct 13, 2017  3:00 PM CDT   MFM BPP SINGLE with [id number]   [facility])    [address]  [facility] [address]   [phone]            Oct 13, 2017  3:30 PM CDT   Radiology MD with CORRY MENDES MD   [facility] Maternal Fetal Medicine [facility])    [address]  [facility] [address]   [phone]           Please arrive at the time given for your first appointment. This visit is used internally to schedule the physician's time  during your ultrasound.            Oct 17, 2017  9:45 AM CDT   ESTABLISHED PRENATAL with Disha Tsang MD   [facility] ([facility])    [address]  [facility] [address]   [phone]            Oct 17, 2017 10:15 AM CDT   MFM BPP SINGLE with [id number]   [facility] Maternal Fetal Medicine Ultrasound - [facility] ([facility])    [address]  [facility] [address]   [phone]            Oct 17, 2017 10:45 AM CDT   Radiology MD with RH MFM MD   [facility] Maternal Fetal Medicine [facility])    [address]  [facility] [address]   [phone]           Please arrive at the time given for your first appointment. This visit is used internally to schedule the physician's time during your ultrasound.            Oct 20, 2017  8:00 AM CDT   MFM BPP SINGLE with [id number]   [facility] Maternal Fetal Medicine Ultrasound - [facility])    [address]  [facility] [address]   [phone]            Oct 20, 2017  8:30 AM CDT   Radiology MD with RH VAUGHN BAUTISTA   [facility])    [address]  [facility] [address]   [phone]           Please arrive at the time given for your first appointment. This visit is used internally to schedule the physician's time during your ultrasound.              Who to contact     If you have questions or need follow up information about today's clinic visit or your schedule please contact [facility] directly at [phone].  Normal or non-critical lab and imaging results will be communicated to you by MyChart, letter or phone within 4 business days after the clinic has received the results. If you do not hear from us within 7 days, please contact the clinic through MyChart or phone. If you have a critical or abnormal lab result, we will  "notify you by phone as soon as possible.  Submit refill requests through [employer] or call your pharmacy and they will forward the refill request to us. Please allow 3 business days for your refill to be completed.          Additional Information About Your Visit        [employer] Information     [employer] lets you send messages to your doctor, view your test results, renew your prescriptions, schedule appointments and more. To sign up, go to [email or web address] . Click on \"Log in\" on the left side of the screen, which will take you to the Welcome page. Then click on \"Sign up Now\" on the right side of the page.     You will be asked to enter the access code listed below, as well as some personal information. Please follow the directions to create your username and password.     Your access code is: [email or web address]  Expires: 10/12/2017 10:27 AM     Your access code will  in 90 days. If you need help or a new code, please call your [place] clinic or [phone].        Care EveryWhere ID     This is your Care EveryWhere ID. This could be used by other organizations to access your [place] medical records  [id number]        Your Vitals Were     Last Period                   02/10/2017 (Exact Date)            Blood Pressure from Last 3 Encounters:   10/03/17 118/82   17 122/74   17 116/68    Weight from Last 3 Encounters:   10/03/17 89.9 kg (198 lb 1.6 oz)   17 89.4 kg (197 lb)   17 89.3 kg (196 lb 14.4 oz)              Today, you had the following     No orders found for display         Today's Medication Changes          These changes are accurate as of: 10/9/17 11:08 AM.  If you have any questions, ask your nurse or doctor.               These medicines have changed or have updated prescriptions.        Dose/Directions    insulin isophane human 100 UNIT/ML injection   Commonly known as:  HumuLIN N PEN   This may have changed:  additional instructions   Used for:  Insulin controlled " gestational diabetes mellitus (GDM) in third trimester        Inject up to 12 units before bed every day.   Quantity:  30 mL   Refills:  1            Where to get your medicines      These medications were sent to [id number] - [place], [place] - [address] AT [facility] [address]  [address], [place] [address]     Phone:  [phone]     insulin isophane human 100 UNIT/ML injection                Primary Care Provider    Physician No Ref-Primary       NO REF-PRIMARY PHYSICIAN        Equal Access to Services     LINDSAY RODRIGUEZ : Hadii aad ku hadasho Soomaali, waaxda luqadaha, qaybta kaalmada adeegyada, waxay adonayin hayrebekah gresham . So [facility] [phone].    ATENCIÓN: Si habla español, tiene a golden disposición servicios gratuitos de asistencia lingüística. Llame al [phone].    We comply with applicable federal civil rights laws and [place] laws. We do not discriminate on the basis of race, color, national origin, age, disability, sex, sexual orientation, or gender identity.            Thank you!     Thank you for choosing [facility]  for your care. Our goal is always to provide you with excellent care. Hearing back from our patients is one way we can continue to improve our services. Please take a few minutes to complete the written survey that you may receive in the mail after your visit with us. Thank you!             Your Updated Medication List - Protect others around you: Learn how to safely use, store and throw away your medicines at www.disposemymeds.org.          This list is accurate as of: 10/9/17 11:08 AM.  Always use your most recent med list.                   Brand Name Dispense Instructions for use Diagnosis    acetone (Urine) test Strp     100 each    Test once daily x1 week, then reduce to once weekly if consistently negative    Gestational diabetes       DACIA CONTOUR NEXT test strip   Generic drug:  blood glucose  monitoring     300 strip    USE TO TEST BLOOD SUGAR FOUR TIMES DAILY OR AS DIRECTED    Gestational diabetes       blood glucose monitoring lancets     100 each    Use to test blood sugar 4 times daily or as directed.    Gestational diabetes       doxylamine 25 MG Tabs tablet    UNISOM     Take 25 mg by mouth At Bedtime        insulin isophane human 100 UNIT/ML injection    HumuLIN N PEN    30 mL    Inject up to 12 units before bed every day.    Insulin controlled gestational diabetes mellitus (GDM) in third trimester       insulin pen needle 32G X 4 MM    INSUPEN PEN NEEDLES    100 each    Use 1 pen needle daily or as directed.    Gestational diabetes       prenatal multivitamin plus iron 27-0.8 MG Tabs per tablet      Take 1 tablet by mouth daily        VITAMIN B6 PO

## 2017-10-09 NOTE — PROGRESS NOTES
Gestational Diabetes Follow-up    Subjective/Objective:    Paulo Lema sent in blood glucose log for review. Last date of communication was: 10/5.    Gestational diabetes is being managed with Humulin/Novolin NPH Insulin 3 units at bedtime    Estimated Date of Delivery: Nov 18, 2017    BG/Food Log:   Good morning,    My name is Paulo Lema and my date of birth is [birth year].     Attached are my blood sugar numbers since the last time I checked in which was October 5.    On Friday morning my sugar was okay in the morning but every morning since then my fasting numbers have been high. I also increased my insulin from 4 to 5 units.     My bedtime snack was a piece of whole wheat toast with peanut butter and a Chobani yogurt each night.     Thank you,   Paulo duong,    My name is Paulo Lema and my date of birth is [birth year].     Attached are my blood sugar numbers since the last time I checked in which was October 5.    On Friday morning my sugar was okay in the morning but every morning since then my fasting numbers have been high. I also increased my insulin from 4 to 5 units.     My bedtime snack was a piece of whole wheat toast with peanut butter and a Chobani yogurt each night.     Thank you,   Paulo        Assessment:    Ketones:not checked  Fasting blood glucoses: 37% in target.  After breakfast: 75% in target.  After lunch: 100% in target.  After dinner: 88% in target.    Plan/Response:  Recommend increase to insulin  NPH to 6 units starting tonight.  Email:  Dave Bates!    Looking good after meals, just that darn morning number is being stubborn.  Powerful mom hormones hard at work ;)    It s great that you went ahead and increased the insulin to try to get ahead of the game.  It looks like you still need a bit more to get that pattern in the morning in goal.     Please increase the NPH to 6 units starting tonight.   If you see a high reading on Tuesday, Wednesday, or Thursday, go ahead and  increase to 7 on Thursday night. We always want to give it 3 days to show what it can do.  If you re comfortable, you can send in readings again Monday and we ll put heads together again.     If you prefer, feel free to send in Friday and we ll make sure all looks good going into the weekend!  Hussein,   Nancy Rivera RD, LD, CDE      Any diabetes medication dose changes were made via the CDE Protocol and Collaborative Practice Agreement with the patient's OB/GYN provider. A copy of this encounter was shared with the provider.

## 2017-10-09 NOTE — Clinical Note
Dr. Wilkins, Continuing to gradually increase insulin to control fasting BG. Thank you! Chelsea Rivera RD, LD, CDE

## 2017-10-10 ENCOUNTER — HOSPITAL ENCOUNTER (OUTPATIENT)
Dept: ULTRASOUND IMAGING | Facility: CLINIC | Age: 27
Discharge: HOME | End: 2017-10-10
Attending: OBSTETRICS & GYNECOLOGY | Admitting: OBSTETRICS & GYNECOLOGY
Payer: COMMERCIAL

## 2017-10-10 ENCOUNTER — TELEPHONE (OUTPATIENT)
Dept: OBGYN | Facility: CLINIC | Age: 27
End: 2017-10-10

## 2017-10-10 ENCOUNTER — OFFICE VISIT (OUTPATIENT)
Dept: MATERNAL FETAL MEDICINE | Facility: CLINIC | Age: 27
End: 2017-10-10
Attending: OBSTETRICS & GYNECOLOGY
Payer: COMMERCIAL

## 2017-10-10 DIAGNOSIS — O24.414 INSULIN CONTROLLED GESTATIONAL DIABETES MELLITUS (GDM) IN THIRD TRIMESTER: ICD-10-CM

## 2017-10-10 DIAGNOSIS — O24.414 INSULIN CONTROLLED GESTATIONAL DIABETES MELLITUS (GDM) IN THIRD TRIMESTER: Primary | ICD-10-CM

## 2017-10-10 PROCEDURE — 76819 FETAL BIOPHYS PROFIL W/O NST: CPT

## 2017-10-10 NOTE — MR AVS SNAPSHOT
After Visit Summary   10/10/2017    Paulo Lema    MRN: [id number]           Patient Information     Date Of Birth          1990        Visit Information        Provider Department      10/10/2017 9:15 AM Anthony Velásquez MD [facility] Maternal Fetal Medicine [facility]        Today's Diagnoses     Insulin controlled gestational diabetes mellitus (GDM) in third trimester    -  1       Follow-ups after your visit        Your next 10 appointments already scheduled     Oct 13, 2017  3:00 PM CDT   MFM BPP SINGLE with [id number]   [facility] Maternal Fetal Medicine Ultrasound - [facility] ([facility])    [address]  [facility] [address]   [phone]            Oct 13, 2017  3:30 PM CDT   Radiology MD with CORRY MENDES MD   [facility] Maternal Fetal Medicine [facility] ([facility])    [address]  [facility] [address]   [phone]           Please arrive at the time given for your first appointment. This visit is used internally to schedule the physician's time during your ultrasound.            Oct 17, 2017  9:45 AM CDT   ESTABLISHED PRENATAL with Disha Tsang MD   [facility] ([facility])    [address]  [facility] [address]   [phone]            Oct 17, 2017 10:15 AM CDT   MFM BPP SINGLE with [id number]   [facility] Maternal Fetal Medicine Ultrasound - [facility])    [address]  [facility] [address]   [phone]            Oct 17, 2017 10:45 AM CDT   Radiology MD with CORRY MENDES MD   [facility] Maternal Fetal Medicine [facility] ([facility])    [address]  [facility] [address]   [phone]           Please arrive at the time given for your first appointment. This visit is used internally to schedule the physician's time during your ultrasound.            Oct 20, 2017  8:00 AM CDT   MFM BPP SINGLE with RHMFMUSR2    [facility] Maternal Fetal Medicine Ultrasound [facility] ([facility])    [address]  [facility] [address]   [phone]            Oct 20, 2017  8:30 AM CDT   Radiology MD with RH VAUGHN BAUTISTA   [facility])    [address]  [facility] [address]   [phone]           Please arrive at the time given for your first appointment. This visit is used internally to schedule the physician's time during your ultrasound.            Oct 24, 2017 11:15 AM CDT   ESTABLISHED PRENATAL with Disha Tsang MD   [facility] ([facility])    [address] [place]  [facility] [address]   [phone]            Oct 24, 2017 11:45 AM CDT   MFM BPP SINGLE with RHMFMUSR3   [facility] Fetal Medicine Ultrasound [facility])    [address]  [facility] [address]   [phone]            Oct 24, 2017 12:15 PM CDT   Radiology MD with CORRY MENDES MD   [facility])    [address]  [facility] [address] [phone]           Please arrive at the time given for your first appointment. This visit is used internally to schedule the physician's time during your ultrasound.              Who to contact     If you have questions or need follow up information about today's clinic visit or your schedule please contact [facility] FETAL [facility] directly at [phone].  Normal or non-critical lab and imaging results will be communicated to you by MyChart, letter or phone within 4 business days after the clinic has received the results. If you do not hear from us within 7 days, please contact the clinic through MyChart or phone. If you have a critical or abnormal lab result, we will notify you by phone as soon as possible.  Submit refill requests through [employer] or call  "your pharmacy and they will forward the refill request to us. Please allow 3 business days for your refill to be completed.          Additional Information About Your Visit        MyChart Information     [employer] Information Management lets you send messages to your doctor, view your test results, renew your prescriptions, schedule appointments and more. To sign up, go to [email or web address] . Click on \"Log in\" on the left side of the screen, which will take you to the Welcome page. Then click on \"Sign up Now\" on the right side of the page.     You will be asked to enter the access code listed below, as well as some personal information. Please follow the directions to create your username and password.     Your access code is: [email or web address]  Expires: 10/12/2017 10:27 AM     Your access code will  in 90 days. If you need help or a new code, please call your [place] clinic or [phone].        Care EveryWhere ID     This is your Care EveryWhere ID. This could be used by other organizations to access your [place] medical records  [id number]        Your Vitals Were     Last Period                   02/10/2017 (Exact Date)            Blood Pressure from Last 3 Encounters:   10/03/17 118/82   17 122/74   17 116/68    Weight from Last 3 Encounters:   10/03/17 89.9 kg (198 lb 1.6 oz)   17 89.4 kg (197 lb)   17 89.3 kg (196 lb 14.4 oz)              Today, you had the following     No orders found for display       Primary Care Provider    Physician No Ref-Primary       NO REF-PRIMARY PHYSICIAN        Equal Access to Services     [facility]: Hadii aad ku hadasho Soomaali, waaxda luqadaha, qaybta kaalmada fantasma, sidra gresham . So [facility] [phone].    ATENCIÓN: Si habla español, tiene a golden disposición servicios gratuitos de asistencia lingüística. Llame al [phone].    We comply with applicable federal civil rights laws and [place] laws. We do not discriminate on the " basis of race, color, national origin, age, disability, sex, sexual orientation, or gender identity.            Thank you!     Thank you for choosing [facility]  for your care. Our goal is always to provide you with excellent care. Hearing back from our patients is one way we can continue to improve our services. Please take a few minutes to complete the written survey that you may receive in the mail after your visit with us. Thank you!             Your Updated Medication List - Protect others around you: Learn how to safely use, store and throw away your medicines at www.disposemymeds.org.          This list is accurate as of: 10/10/17  9:26 AM.  Always use your most recent med list.                   Brand Name Dispense Instructions for use Diagnosis    acetone (Urine) test Strp     100 each    Test once daily x1 week, then reduce to once weekly if consistently negative    Gestational diabetes       DACIA CONTOUR NEXT test strip   Generic drug:  blood glucose monitoring     300 strip    USE TO TEST BLOOD SUGAR FOUR TIMES DAILY OR AS DIRECTED    Gestational diabetes       blood glucose monitoring lancets     100 each    Use to test blood sugar 4 times daily or as directed.    Gestational diabetes       doxylamine 25 MG Tabs tablet    UNISOM     Take 25 mg by mouth At Bedtime        insulin isophane human 100 UNIT/ML injection    HumuLIN N PEN    30 mL    Inject up to 12 units before bed every day.    Insulin controlled gestational diabetes mellitus (GDM) in third trimester       insulin pen needle 32G X 4 MM    INSUPEN PEN NEEDLES    100 each    Use 1 pen needle daily or as directed.    Gestational diabetes       prenatal multivitamin plus iron 27-0.8 MG Tabs per tablet      Take 1 tablet by mouth daily        VITAMIN B6 PO

## 2017-10-10 NOTE — PROGRESS NOTES
Please refer to ultrasound report under 'Imaging' Studies of 'Chart Review' tabs.    Anthony Velásquez M.D.

## 2017-10-13 ENCOUNTER — HOSPITAL ENCOUNTER (OUTPATIENT)
Dept: ULTRASOUND IMAGING | Facility: CLINIC | Age: 27
Discharge: HOME | End: 2017-10-13
Attending: OBSTETRICS & GYNECOLOGY | Admitting: OBSTETRICS & GYNECOLOGY
Payer: COMMERCIAL

## 2017-10-13 ENCOUNTER — OFFICE VISIT (OUTPATIENT)
Dept: MATERNAL FETAL MEDICINE | Facility: CLINIC | Age: 27
End: 2017-10-13
Attending: OBSTETRICS & GYNECOLOGY
Payer: COMMERCIAL

## 2017-10-13 DIAGNOSIS — O24.414 INSULIN CONTROLLED GESTATIONAL DIABETES MELLITUS (GDM) IN THIRD TRIMESTER: Primary | ICD-10-CM

## 2017-10-13 DIAGNOSIS — O24.414 INSULIN CONTROLLED GESTATIONAL DIABETES MELLITUS (GDM) IN THIRD TRIMESTER: ICD-10-CM

## 2017-10-13 PROCEDURE — 76819 FETAL BIOPHYS PROFIL W/O NST: CPT

## 2017-10-13 NOTE — PROGRESS NOTES
"Please see \"Imaging\" tab under \"Chart Review\" for details of today's US at the [facility].    Tony Modi MD  Maternal-Fetal Medicine    "

## 2017-10-13 NOTE — MR AVS SNAPSHOT
After Visit Summary   10/13/2017    Paulo Lema    MRN: [id number]           Patient Information     Date Of Birth          1990        Visit Information        Provider Department      10/13/2017 3:30 PM Tony Modi MD [facility] Maternal Fetal Medicine [place] [facility]        Today's Diagnoses     Insulin controlled gestational diabetes mellitus (GDM) in third trimester    -  1       Follow-ups after your visit        Your next 10 appointments already scheduled     Oct 13, 2017  3:30 PM CDT   Radiology MD with Tony Modi MD   [facility] Maternal Fetal Medicine [facility])    [address]  [facility] [address]   [phone]           Please arrive at the time given for your first appointment. This visit is used internally to schedule the physician's time during your ultrasound.            Oct 17, 2017  9:45 AM CDT   ESTABLISHED PRENATAL with Disha Tsang MD   [facility]    [address]  [facility] [address]   [phone]            Oct 17, 2017 10:15 AM CDT   MFM BPP SINGLE with [id number]   [facility] Maternal Fetal Medicine Ultrasound - [facility])    [address]  [facility] [address]   [phone]            Oct 17, 2017 10:45 AM CDT   Radiology MD with  MFM MD   [facility] Maternal Fetal Medicine [place] [facility] ([facility] [place] [facility])    [address]  [facility] [address]   [phone]           Please arrive at the time given for your first appointment. This visit is used internally to schedule the physician's time during your ultrasound.            Oct 20, 2017  8:00 AM CDT   MFM BPP SINGLE with [id number]   [facility] Maternal Fetal Medicine Ultrasound - [facility])    [address]  [facility] [address]   [phone]            Oct 20, 2017  8:30 AM CDT   Radiology MD with  CORRY MENDES MD   [facility] Maternal Fetal Medicine [facility] ([facility])    [address]  [facility] [address]   [phone]           Please arrive at the time given for your first appointment. This visit is used internally to schedule the physician's time during your ultrasound.            Oct 24, 2017 11:15 AM CDT   ESTABLISHED PRENATAL with Disha Tsang MD   [facility])    [address] [place]  [facility] [address]   [phone]            Oct 24, 2017 11:45 AM CDT   [facility] BPP SINGLE with [id number]   [facility] Maternal Fetal Medicine Ultrasound - [facility])    [address] [facility] [address]  [facility] [address]   [phone]            Oct 24, 2017 12:15 PM CDT   Radiology MD with CORRY MENDES MD   [facility] ([facility])    [address] [facility] [address]  [facility] [address]   [phone]           Please arrive at the time given for your first appointment. This visit is used internally to schedule the physician's time during your ultrasound.            Oct 27, 2017  3:00 PM CDT   [facility] BPP SINGLE with [id number]   [facility] Maternal Fetal [facility] Ultrasound [facility])    [address] [facility] [address]  [facility] [address]   [phone]              Who to contact     If you have questions or need follow up information about today's clinic visit or your schedule please contact [facility] MATERNAL FETAL [facility] directly at [phone].  Normal or non-critical lab and imaging results will be communicated to you by MyChart, letter or phone within 4 business days after the clinic has received the results. If you do not hear from us within 7 days, please contact the clinic through MyChart or phone. If you have a critical or abnormal lab result, we will notify you by phone as soon as possible.  Submit refill requests through  "MyChart or call your pharmacy and they will forward the refill request to us. Please allow 3 business days for your refill to be completed.          Additional Information About Your Visit        MyChart Information     [employer] labshart lets you send messages to your doctor, view your test results, renew your prescriptions, schedule appointments and more. To sign up, go to [email or web address] . Click on \"Log in\" on the left side of the screen, which will take you to the Welcome page. Then click on \"Sign up Now\" on the right side of the page.     You will be asked to enter the access code listed below, as well as some personal information. Please follow the directions to create your username and password.     Your access code is: [email or web address]  Expires: 2018  3:28 PM     Your access code will  in 90 days. If you need help or a new code, please call your [place] clinic or [phone].        Care EveryWhere ID     This is your Care EveryWhere ID. This could be used by other organizations to access your [place] medical records  [id number]        Your Vitals Were     Last Period                   02/10/2017 (Exact Date)            Blood Pressure from Last 3 Encounters:   10/03/17 118/82   17 122/74   17 116/68    Weight from Last 3 Encounters:   10/03/17 89.9 kg (198 lb 1.6 oz)   17 89.4 kg (197 lb)   17 89.3 kg (196 lb 14.4 oz)              Today, you had the following     No orders found for display       Primary Care Provider    Physician No Ref-Primary       NO REF-PRIMARY PHYSICIAN        Equal Access to Services     [facility]: Hadii aad ku hadasho Soomaali, waaxda luqadaha, qaybta kaalmada fantasma, sidra gresham . So [facility] [phone].    ATENCIÓN: Si habla español, tiene a golden disposición servicios gratuitos de asistencia lingüística. Llame al [phone].    We comply with applicable federal civil rights laws and [place] laws. We do not " discriminate on the basis of race, color, national origin, age, disability, sex, sexual orientation, or gender identity.            Thank you!     Thank you for choosing [facility]  for your care. Our goal is always to provide you with excellent care. Hearing back from our patients is one way we can continue to improve our services. Please take a few minutes to complete the written survey that you may receive in the mail after your visit with us. Thank you!             Your Updated Medication List - Protect others around you: Learn how to safely use, store and throw away your medicines at www.disposemymeds.org.          This list is accurate as of: 10/13/17  3:28 PM.  Always use your most recent med list.                   Brand Name Dispense Instructions for use Diagnosis    acetone (Urine) test Strp     100 each    Test once daily x1 week, then reduce to once weekly if consistently negative    Gestational diabetes       DACIA CONTOUR NEXT test strip   Generic drug:  blood glucose monitoring     300 strip    USE TO TEST BLOOD SUGAR FOUR TIMES DAILY OR AS DIRECTED    Gestational diabetes       blood glucose monitoring lancets     100 each    Use to test blood sugar 4 times daily or as directed.    Gestational diabetes       doxylamine 25 MG Tabs tablet    UNISOM     Take 25 mg by mouth At Bedtime        insulin isophane human 100 UNIT/ML injection    HumuLIN N PEN    30 mL    Inject up to 12 units before bed every day.    Insulin controlled gestational diabetes mellitus (GDM) in third trimester       insulin pen needle 32G X 4 MM    INSUPEN PEN NEEDLES    100 each    Use 1 pen needle daily or as directed.    Gestational diabetes       prenatal multivitamin plus iron 27-0.8 MG Tabs per tablet      Take 1 tablet by mouth daily        VITAMIN B6 PO

## 2017-10-16 ENCOUNTER — NURSE TRIAGE (OUTPATIENT)
Dept: NURSING | Facility: CLINIC | Age: 27
End: 2017-10-16

## 2017-10-16 ENCOUNTER — HOSPITAL ENCOUNTER (OUTPATIENT)
Facility: CLINIC | Age: 27
Discharge: HOME | End: 2017-10-17
Attending: OBSTETRICS & GYNECOLOGY | Admitting: OBSTETRICS & GYNECOLOGY
Payer: COMMERCIAL

## 2017-10-16 VITALS — DIASTOLIC BLOOD PRESSURE: 55 MMHG | SYSTOLIC BLOOD PRESSURE: 106 MMHG

## 2017-10-16 PROBLEM — Z36.89 ENCOUNTER FOR TRIAGE IN PREGNANT PATIENT: Status: ACTIVE | Noted: 2017-10-16

## 2017-10-16 LAB — A1 MICROGLOB PLACENTAL VAG QL: NEGATIVE

## 2017-10-16 PROCEDURE — 99214 OFFICE O/P EST MOD 30 MIN: CPT

## 2017-10-16 PROCEDURE — 84112 EVAL AMNIOTIC FLUID PROTEIN: CPT | Performed by: OBSTETRICS & GYNECOLOGY

## 2017-10-16 NOTE — IP AVS SNAPSHOT
[facility] Labor and Delivery    [address]    [facility] [address]    Phone:  [phone]    Fax:  [phone]                                       After Visit Summary   10/16/2017    Paulo Lema    MRN: [id number]           After Visit Summary Signature Page     I have received my discharge instructions, and my questions have been answered. I have discussed any challenges I see with this plan with the nurse or doctor.    ..........................................................................................................................................  Patient/Patient Representative Signature      ..........................................................................................................................................  Patient Representative Print Name and Relationship to Patient    ..................................................               ................................................  Date                                            Time    ..........................................................................................................................................  Reviewed by Signature/Title    ...................................................              ..............................................  Date                                                            Time

## 2017-10-16 NOTE — IP AVS SNAPSHOT
MRN:[id number]                      After Visit Summary   10/16/2017    Paulo Lema    MRN: [id number]           Thank you!     Thank you for choosing [facility] for your care. Our goal is always to provide you with excellent care. Hearing back from our patients is one way we can continue to improve our services. Please take a few minutes to complete the written survey that you may receive in the mail after you visit. If you would like to speak to someone directly about your visit please contact Patient Relations at [phone]. Thank you!          Patient Information     Date Of Birth          1990        About your hospital stay     You were admitted on:  October 16, 2017 You last received care in the:  [facility] Labor [facility] Delivery    You were discharged on:  October 17, 2017       Who to Call     For medical emergencies, please call 911.  For non-urgent questions about your medical care, please call your primary care provider or clinic, None          Attending Provider     Provider Specialty    Jeffery Patrick MD OB/Gyn       Primary Care Provider    Physician No Ref-Primary      Your next 10 appointments already scheduled     Oct 17, 2017  9:45 AM CDT   ESTABLISHED PRENATAL with Disha Tsang MD   [facility]    [address] [place]  [facility] [address]   [phone]            Oct 17, 2017 10:15 AM CDT   VAUGHN BPP SINGLE with [email or web address]   [facility] Maternal Fetal Medicine Ultrasound - [facility])    [address]  [facility] [address]   [phone]            Oct 17, 2017 10:45 AM CDT   Radiology MD with  VAUGHN BAUTISTA   [facility] Maternal Fetal Medicine [facility])    [address]  [facility] [address]   [phone]           Please arrive at the time given for your first appointment. This visit is used internally to schedule  the physician's time during your ultrasound.            Oct 20, 2017  8:00 AM CDT   MFM BPP SINGLE with [email or web address]   [facility]etal [facility] Ultrasound - [facility] ([facility])    [address]  [facility] [address]   [phone]            Oct 20, 2017  8:30 AM CDT   Radiology MD with RH VAUGHN BAUTISTA   [facility] Fetal Medicine [facility] ([facility])    [address]  [facility] [address]   [phone]           Please arrive at the time given for your first appointment. This visit is used internally to schedule the physician's time during your ultrasound.            Oct 24, 2017 11:15 AM CDT   ESTABLISHED PRENATAL with Disha Tsang MD   [facility] ([facility])    [address] [place]  [facility] [address]   [phone]            Oct 24, 2017 11:45 AM CDT   MFM BPP SINGLE with [email or web address]   [facility] Maternal Fetal Medicine Ultrasound - [facility] ([facility])    [address]  [facility] [address]   [phone]            Oct 24, 2017 12:15 PM CDT   Radiology MD with RH VAUGHN BAUTISTA   [facility] Fetal Medicine [facility] ([facility])    [address]  [facility] [address]   [phone]           Please arrive at the time given for your first appointment. This visit is used internally to schedule the physician's time during your ultrasound.            Oct 27, 2017  3:00 PM CDT   MFM BPP SINGLE with [email or web address]   [facility] Maternal Fetal Medicine Ultrasound - [facility] ([facility])    [address]  [facility] [address]   [phone]            Oct 27, 2017  3:30 PM CDT   Radiology MD with RH VAUGHN BAUTISTA   [facility] Fetal Medicine [facility])    [address]  [facility] [address]   [phone]           Please arrive at the time given for your first  appointment. This visit is used internally to schedule the physician's time during your ultrasound.              Further instructions from your care team       Discharge Instruction for Undelivered Patients      You were seen for: Membrane Assessment  We Consulted: Dr Patrick  You had (Test or Medicine):Amnisure and EFM.     Diet:   Drink 8 to 12 glasses of liquids (milk, juice, water) every day.  You may eat meals and snacks.  To manager your diabetes, follow the guidelines for eating and drinking given to you by your Clinic Provider or Diabetes Educator.       Activity:  Call your doctor or nurse midwife if your baby is moving less than usual.     Call your provider if you notice:  Swelling in your face or increased swelling in your hands or legs.  Headaches that are not relieved by Tylenol (acetaminophen).  Changes in your vision (blurring: seeing spots or stars.)  Nausea (sick to your stomach) and vomiting (throwing up).   Weight gain of 5 pounds or more per week.  Heartburn that doesn't go away.  Signs of bladder infection: pain when you urinate (use the toilet), need to go more often and more urgently.  The bag of rodgers (rupture of membranes) breaks, or you notice leaking in your underwear.  Bright red blood in your underwear.  Abdominal (lower belly) or stomach pain.  For first baby: Contractions (tightening) less than 5 minutes apart for one hour or more.  Increase or change in vaginal discharge (note the color and amount)      Follow-up:  As scheduled in the clinic          Pending Results     No orders found for last 3 day(s).            Admission Information     Date & Time Provider Department Dept. Phone    10/16/2017 Jeffery Patrick MD [facility] Labor and Delivery [phone]      Your Vitals Were     Blood Pressure Last Period                106/55 02/10/2017 (Exact Date)          MyChart Information     [employer] lets you send messages to your doctor, view your test results, renew your  "prescriptions, schedule appointments and more. To sign up, go to [email or web address] . Click on \"Log in\" on the left side of the screen, which will take you to the Welcome page. Then click on \"Sign up Now\" on the right side of the page.     You will be asked to enter the access code listed below, as well as some personal information. Please follow the directions to create your username and password.     Your access code is: [email or web address]  Expires: 2018  3:28 PM     Your access code will  in 90 days. If you need help or a new code, please call your [facility] clinic or [phone].        Care EveryWhere ID     This is your Care EveryWhere ID. This could be used by other organizations to access your [facility] medical records  [id number]        Equal Access to Services     LINDSAY RODRIGUEZ : Anel Martínez, tova keys, ericka meek, sidra gresham . So [facility] [phone].    ATENCIÓN: Si habla español, tiene a golden disposición servicios gratuitos de asistencia lingüística. Llame al [phone].    We comply with applicable federal civil rights laws and [place] laws. We do not discriminate on the basis of race, color, national origin, age, disability, sex, sexual orientation, or gender identity.               Review of your medicines      UNREVIEWED medicines. Ask your doctor about these medicines        Dose / Directions    doxylamine 25 MG Tabs tablet   Commonly known as:  UNISOM        Dose:  25 mg   Take 25 mg by mouth At Bedtime   Refills:  0       insulin isophane human 100 UNIT/ML injection   Commonly known as:  HumuLIN N PEN   Used for:  Insulin controlled gestational diabetes mellitus (GDM) in third trimester        Inject up to 12 units before bed every day.   Quantity:  30 mL   Refills:  1       prenatal multivitamin plus iron 27-0.8 MG Tabs per tablet        Dose:  1 tablet   Take 1 tablet by mouth daily   Refills:  0       VITAMIN B6 PO "        Refills:  0         CONTINUE these medicines which have NOT CHANGED        Dose / Directions    acetone (Urine) test Strp   Used for:  Gestational diabetes        Test once daily x1 week, then reduce to once weekly if consistently negative   Quantity:  100 each   Refills:  3       DACIA CONTOUR NEXT test strip   Used for:  Gestational diabetes   Generic drug:  blood glucose monitoring        USE TO TEST BLOOD SUGAR FOUR TIMES DAILY OR AS DIRECTED   Quantity:  300 strip   Refills:  3       blood glucose monitoring lancets   Used for:  Gestational diabetes        Use to test blood sugar 4 times daily or as directed.   Quantity:  100 each   Refills:  1       insulin pen needle 32G X 4 MM   Commonly known as:  INSUPEN PEN NEEDLES   Used for:  Gestational diabetes        Use 1 pen needle daily or as directed.   Quantity:  100 each   Refills:  1                Protect others around you: Learn how to safely use, store and throw away your medicines at www.disposemymeds.org.             Medication List: This is a list of all your medications and when to take them. Check marks below indicate your daily home schedule. Keep this list as a reference.      Medications           Morning Afternoon Evening Bedtime As Needed    acetone (Urine) test Strp   Test once daily x1 week, then reduce to once weekly if consistently negative                                DACIA CONTOUR NEXT test strip   USE TO TEST BLOOD SUGAR FOUR TIMES DAILY OR AS DIRECTED   Generic drug:  blood glucose monitoring                                blood glucose monitoring lancets   Use to test blood sugar 4 times daily or as directed.                                doxylamine 25 MG Tabs tablet   Commonly known as:  UNISOM   Take 25 mg by mouth At Bedtime                                insulin isophane human 100 UNIT/ML injection   Commonly known as:  HumuLIN N PEN   Inject up to 12 units before bed every day.                                insulin pen  needle 32G X 4 MM   Commonly known as:  INSUPEN PEN NEEDLES   Use 1 pen needle daily or as directed.                                prenatal multivitamin plus iron 27-0.8 MG Tabs per tablet   Take 1 tablet by mouth daily                                VITAMIN B6 PO

## 2017-10-17 ENCOUNTER — OFFICE VISIT (OUTPATIENT)
Dept: MATERNAL FETAL MEDICINE | Facility: CLINIC | Age: 27
End: 2017-10-17
Attending: OBSTETRICS & GYNECOLOGY
Payer: COMMERCIAL

## 2017-10-17 ENCOUNTER — HOSPITAL ENCOUNTER (OUTPATIENT)
Dept: ULTRASOUND IMAGING | Facility: CLINIC | Age: 27
Discharge: HOME | End: 2017-10-17
Attending: OBSTETRICS & GYNECOLOGY | Admitting: OBSTETRICS & GYNECOLOGY
Payer: COMMERCIAL

## 2017-10-17 ENCOUNTER — PRENATAL OFFICE VISIT (OUTPATIENT)
Dept: OBGYN | Facility: CLINIC | Age: 27
End: 2017-10-17
Payer: COMMERCIAL

## 2017-10-17 VITALS
WEIGHT: 201 LBS | SYSTOLIC BLOOD PRESSURE: 128 MMHG | BODY MASS INDEX: 35.61 KG/M2 | HEART RATE: 80 BPM | DIASTOLIC BLOOD PRESSURE: 70 MMHG | HEIGHT: 63 IN

## 2017-10-17 DIAGNOSIS — O99.810 ABNORMAL MATERNAL GLUCOSE TOLERANCE, ANTEPARTUM: Primary | ICD-10-CM

## 2017-10-17 DIAGNOSIS — O24.414 INSULIN CONTROLLED GESTATIONAL DIABETES MELLITUS (GDM) IN THIRD TRIMESTER: Primary | ICD-10-CM

## 2017-10-17 DIAGNOSIS — O24.414 INSULIN CONTROLLED GESTATIONAL DIABETES MELLITUS (GDM) IN THIRD TRIMESTER: ICD-10-CM

## 2017-10-17 PROCEDURE — 99207 ZZC PRENATAL VISIT: CPT | Performed by: OBSTETRICS & GYNECOLOGY

## 2017-10-17 PROCEDURE — 76819 FETAL BIOPHYS PROFIL W/O NST: CPT

## 2017-10-17 PROCEDURE — 99213 OFFICE O/P EST LOW 20 MIN: CPT | Mod: 25

## 2017-10-17 NOTE — NURSING NOTE
"Chief Complaint   Patient presents with     Prenatal Care       Initial /70 (BP Location: Right arm, Patient Position: Sitting, Cuff Size: Adult Regular)  Pulse 80  Ht 5' 3\" (1.6 m)  Wt 201 lb (91.2 kg)  LMP 02/10/2017 (Exact Date)  Breastfeeding? No  BMI 35.61 kg/m2 Estimated body mass index is 35.61 kg/(m^2) as calculated from the following:    Height as of this encounter: 5' 3\" (1.6 m).    Weight as of this encounter: 201 lb (91.2 kg).  Medication Reconciliation: complete     35w3d  + FM daily  - contractions  - bleeding  + discharge - increased last NOC and went to L&D for amnisure (neg)  + heartburn - TUMS  - headache  Sharonda Au LPN      "

## 2017-10-17 NOTE — TELEPHONE ENCOUNTER
Caller states she had an ultra sound  3 days ago and radiologist told her that her amniotic fluid amount had decreased; today has noted that she is  leaking small amounts of clear fluid; unsure if it is urine or  amniotic fluid;  No urinary symptoms  Triage protocol reviewed  No contractions  On call provider  Dr. Correa paged via  @ [phone] to call patient at home @ [phone] per [facility] protocol for  [facility]  Reason for Disposition    Possible incontinence of urine, BUT patient uncertain (all triage questions negative)    Protocols used: PREGNANCY - RUPTURE OF MEMBRANES-ADULT-  Diane Barksdale RN  [facility]

## 2017-10-17 NOTE — MR AVS SNAPSHOT
After Visit Summary   10/17/2017    Paulo Lema    MRN: [id number]           Patient Information     Date Of Birth          1990        Visit Information        Provider Department      10/17/2017 9:45 AM Disha Tsang MD [facility]        Today's Diagnoses     Insulin controlled gestational diabetes mellitus (GDM) in third trimester    -  1       Follow-ups after your visit        Your next 10 appointments already scheduled     Oct 17, 2017 10:15 AM CDT   MFM BPP SINGLE with [email or web address]   [facility])    [address]  [facility] [address]   [phone]            Oct 17, 2017 10:45 AM CDT   Radiology MD with CORRY MENDES MD   [facility] Fetal Medicine [facility])    [address]  [facility] [address]   [phone]           Please arrive at the time given for your first appointment. This visit is used internally to schedule the physician's time during your ultrasound.            Oct 20, 2017  8:00 AM CDT   MFM BPP SINGLE with [email or web address]   [facility] ([facility])    [address]  [facility] [address]   [phone]            Oct 20, 2017  8:30 AM CDT   Radiology MD with CORRY MENDES MD   [facility] Maternal Fetal Medicine [facility])    [address]  [facility] [address]   [phone]           Please arrive at the time given for your first appointment. This visit is used internally to schedule the physician's time during your ultrasound.            Oct 24, 2017 11:45 AM CDT   MFM BPP SINGLE with [email or web address]   [facility] Maternal Fetal Medicine Ultrasound - [facility])    [address]  [facility] [address]   [phone]            Oct 24, 2017 12:15 PM CDT   Radiology MD with CORRY MENDES MD    [facility] Maternal Fetal Medicine [facility])    [address]  [facility] [address] [phone]           Please arrive at the time given for your first appointment. This visit is used internally to schedule the physician's time during your ultrasound.            Oct 24, 2017  1:15 PM CDT   ESTABLISHED PRENATAL with Isaiah Madrid MD   [facility]    [address] [facility] [address] [phone]            Oct 27, 2017  3:00 PM CDT   MFM BPP SINGLE with [email or web address]   [facility] Maternal Fetal Medicine Ultrasound - [facility])    [address] [facility] [address] [place] [address] [phone]            Oct 27, 2017  3:30 PM CDT   Radiology MD with  VAUGHN BAUTISTA   [facility] Medicine [facility])    [address]  [facility] [address] [phone]           Please arrive at the time given for your first appointment. This visit is used internally to schedule the physician's time during your ultrasound.            Oct 31, 2017  9:15 AM CDT   ESTABLISHED PRENATAL with Disha Tsang MD   [facility] ([facility]    [address] [place], [place] [address] [facility] [address] [phone]              Who to contact     If you have questions or need follow up information about today's clinic visit or your schedule please contact [facility] directly at [phone].  Normal or non-critical lab and imaging results will be communicated to you by MyChart, letter or phone within 4 business days after the clinic has received the results. If you do not hear from us within 7 days, please contact the clinic through MyChart or phone. If you have a critical or abnormal lab result, we will notify you by phone as soon as possible.  Submit refill requests through [employer] or call your pharmacy and they  "will forward the refill request to us. Please allow 3 business days for your refill to be completed.          Additional Information About Your Visit        [employer] Information     [employer] lets you send messages to your doctor, view your test results, renew your prescriptions, schedule appointments and more. To sign up, go to [email or web address] . Click on \"Log in\" on the left side of the screen, which will take you to the Welcome page. Then click on \"Sign up Now\" on the right side of the page.     You will be asked to enter the access code listed below, as well as some personal information. Please follow the directions to create your username and password.     Your access code is: [email or web address]  Expires: 2018  3:28 PM     Your access code will  in 90 days. If you need help or a new code, please call your [facility] clinic or [phone].        Care EveryWhere ID     This is your [facility] EveryWhere ID. This could be used by other organizations to access your [facility] medical records  [id number]        Your Vitals Were     Pulse Height Last Period Breastfeeding? BMI (Body Mass Index)       80 5' 3\" (1.6 m) 02/10/2017 (Exact Date) No 35.61 kg/m2        Blood Pressure from Last 3 Encounters:   10/17/17 128/70   10/16/17 106/55   10/03/17 118/82    Weight from Last 3 Encounters:   10/17/17 201 lb (91.2 kg)   10/03/17 198 lb 1.6 oz (89.9 kg)   17 197 lb (89.4 kg)              Today, you had the following     No orders found for display       Primary Care Provider    Physician No Ref-Primary       NO REF-PRIMARY PHYSICIAN        Equal Access to Services     [facility] Hospital[facility] : Hadii fátima Martínez, rileyda massimo, qayasirta kaalmada fantasma, sidra gresham . So [facility] [phone].    ATENCIÓN: Si habla español, tiene a golden disposición servicios gratuitos de asistencia lingüística. Llame al [phone].    We comply with applicable federal civil rights laws and [place] " laws. We do not discriminate on the basis of race, color, national origin, age, disability, sex, sexual orientation, or gender identity.            Thank you!     Thank you for choosing [facility]  for your care. Our goal is always to provide you with excellent care. Hearing back from our patients is one way we can continue to improve our services. Please take a few minutes to complete the written survey that you may receive in the mail after your visit with us. Thank you!             Your Updated Medication List - Protect others around you: Learn how to safely use, store and throw away your medicines at www.disposemymeds.org.          This list is accurate as of: 10/17/17 10:09 AM.  Always use your most recent med list.                   Brand Name Dispense Instructions for use Diagnosis    acetone (Urine) test Strp     100 each    Test once daily x1 week, then reduce to once weekly if consistently negative    Gestational diabetes       DACIA CONTOUR NEXT test strip   Generic drug:  blood glucose monitoring     300 strip    USE TO TEST BLOOD SUGAR FOUR TIMES DAILY OR AS DIRECTED    Gestational diabetes       blood glucose monitoring lancets     100 each    Use to test blood sugar 4 times daily or as directed.    Gestational diabetes       doxylamine 25 MG Tabs tablet    UNISOM     Take 25 mg by mouth At Bedtime        insulin isophane human 100 UNIT/ML injection    HumuLIN N PEN    30 mL    Inject up to 12 units before bed every day.    Insulin controlled gestational diabetes mellitus (GDM) in third trimester       insulin pen needle 32G X 4 MM    INSUPEN PEN NEEDLES    100 each    Use 1 pen needle daily or as directed.    Gestational diabetes       prenatal multivitamin plus iron 27-0.8 MG Tabs per tablet      Take 1 tablet by mouth daily        VITAMIN B6 PO

## 2017-10-17 NOTE — MR AVS SNAPSHOT
After Visit Summary   10/17/2017    Paulo Lema    MRN: [id number]           Patient Information     Date Of Birth          1990        Visit Information        Provider Department      10/17/2017 10:45 AM Sindy Alvarez MD [facility] Maternal Fetal Medicine [facility]        Today's Diagnoses     Abnormal maternal glucose tolerance, antepartum    -  1       Follow-ups after your visit        Your next 10 appointments already scheduled     Oct 20, 2017  8:00 AM CDT   MFM BPP SINGLE with RHMFMUSR2   [facility] Maternal Fetal Medicine Ultrasound - [facility])    [address]  [facility] [address]   [phone]            Oct 20, 2017  8:30 AM CDT   Radiology MD with RH VAUGHN BAUTISTA   [facility] Maternal Fetal Medicine [facility])    [address]  [facility] [address]   [phone]           Please arrive at the time given for your first appointment. This visit is used internally to schedule the physician's time during your ultrasound.            Oct 24, 2017 11:45 AM CDT   MFM BPP SINGLE with RHMFMUSR3   [facility] Maternal Fetal Medicine Ultrasound - [facility] ([facility] [place])    [address]  [facility] [address]   [phone]            Oct 24, 2017 12:15 PM CDT   Radiology MD with RH VAUGHN BAUTISTA   [facility] Maternal Fetal Medicine [facility] ([facility] [place])    [address]  [facility] [address]   [phone]           Please arrive at the time given for your first appointment. This visit is used internally to schedule the physician's time during your ultrasound.            Oct 24, 2017  1:15 PM CDT   [facility]ABLISHED PRENATAL with [facility]    [address]  [facility] [address]   [phone]            Oct 27, 2017  3:00 PM CDT   MFM BPP SINGLE with RHMFMUSR2   [facility] Maternal Fetal  Medicine Ultrasound - [facility] ([facility])    [address] [facility] [address]  [facility] [address]   [phone]            Oct 27, 2017  3:30 PM CDT   Radiology MD with  VAUGHN BAUTISTA   [facility] Maternal [facility] ([facility])    [address] [facility] [address]  [facility] [address]   [phone]           Please arrive at the time given for your first appointment. This visit is used internally to schedule the physician's time during your ultrasound.            Oct 31, 2017  8:00 AM CDT   MFM US COMPRE SINGLE F/U with RHMFMUSR3   [facility] Ultrasound [facility] ([facility])    [address]  [facility] [address] [phone]           Wear comfortable clothes and leave your valuables at home.            Oct 31, 2017  8:30 AM CDT   Radiology MD with  VAUGHN BAUTISTA   [facility] ([facility])    [address]  [facility] [id number] [phone]           Please arrive at the time given for your first appointment. This visit is used internally to schedule the physician's time during your ultrasound.            Oct 31, 2017  9:15 AM CDT   ESTABLISHED PRENATAL with Disha Tsang MD   [facility]    [address] [facility] [address]   [phone]              Who to contact     If you have questions or need follow up information about today's clinic visit or your schedule please contact [facility] directly at [phone].  Normal or non-critical lab and imaging results will be communicated to you by MyChart, letter or phone within 4 business days after the clinic has received the results. If you do not hear from us within 7 days, please contact the clinic through MyChart or phone. If you have a critical or abnormal lab result, we will notify you by phone as soon as  "possible.  Submit refill requests through Melon or call your pharmacy and they will forward the refill request to us. Please allow 3 business days for your refill to be completed.          Additional Information About Your Visit        [employer] Information     Melon lets you send messages to your doctor, view your test results, renew your prescriptions, schedule appointments and more. To sign up, go to [email or web address] . Click on \"Log in\" on the left side of the screen, which will take you to the Welcome page. Then click on \"Sign up Now\" on the right side of the page.     You will be asked to enter the access code listed below, as well as some personal information. Please follow the directions to create your username and password.     Your access code is: [email or web address]  Expires: 2018  3:28 PM     Your access code will  in 90 days. If you need help or a new code, please call your [place] clinic or [phone].        Care EveryWhere ID     This is your Care EveryWhere ID. This could be used by other organizations to access your [place] medical records  [id number]        Your Vitals Were     Last Period                   02/10/2017 (Exact Date)            Blood Pressure from Last 3 Encounters:   10/17/17 128/70   10/16/17 106/55   10/03/17 118/82    Weight from Last 3 Encounters:   10/17/17 91.2 kg (201 lb)   10/03/17 89.9 kg (198 lb 1.6 oz)   17 89.4 kg (197 lb)              Today, you had the following     No orders found for display       Primary Care Provider    Physician No Ref-Primary       NO REF-PRIMARY PHYSICIAN        Equal Access to Services     [facility]: Hadii aad ku hadfabian Martínez, waaxda luqadaha, qaybta kaalmada fantasma, sidra gresham . So [facility] [phone].    ATENCIÓN: Si habla español, tiene a golden disposición servicios gratuitos de asistencia lingüística. Llame al [phone].    We comply with applicable federal civil rights laws and " [place] laws. We do not discriminate on the basis of race, color, national origin, age, disability, sex, sexual orientation, or gender identity.            Thank you!     Thank you for choosing [facility]  for your care. Our goal is always to provide you with excellent care. Hearing back from our patients is one way we can continue to improve our services. Please take a few minutes to complete the written survey that you may receive in the mail after your visit with us. Thank you!             Your Updated Medication List - Protect others around you: Learn how to safely use, store and throw away your medicines at www.disposemymeds.org.          This list is accurate as of: 10/17/17  4:16 PM.  Always use your most recent med list.                   Brand Name Dispense Instructions for use Diagnosis    acetone (Urine) test Strp     100 each    Test once daily x1 week, then reduce to once weekly if consistently negative    Gestational diabetes       DACIA CONTOUR NEXT test strip   Generic drug:  blood glucose monitoring     300 strip    USE TO TEST BLOOD SUGAR FOUR TIMES DAILY OR AS DIRECTED    Gestational diabetes       blood glucose monitoring lancets     100 each    Use to test blood sugar 4 times daily or as directed.    Gestational diabetes       doxylamine 25 MG Tabs tablet    UNISOM     Take 25 mg by mouth At Bedtime        insulin isophane human 100 UNIT/ML injection    HumuLIN N PEN    30 mL    Inject up to 12 units before bed every day.    Insulin controlled gestational diabetes mellitus (GDM) in third trimester       insulin pen needle 32G X 4 MM    INSUPEN PEN NEEDLES    100 each    Use 1 pen needle daily or as directed.    Gestational diabetes       prenatal multivitamin plus iron 27-0.8 MG Tabs per tablet      Take 1 tablet by mouth daily        VITAMIN B6 PO

## 2017-10-17 NOTE — PLAN OF CARE
Problem: Patient Care Overview  Goal: Plan of Care/Patient Progress Review  Outcome: No Change  Data: Patient [facility] at 2254.   Reason for maternal/fetal assessment per patient is Spontaneous Rupture of Membrane  . Patient is a . Prenatal record reviewed.      Obstetric History       T0      L0     SAB0   TAB0   Ectopic0   Multiple0   Live Births0        # Outcome Date GA Lbr González/2nd Weight Sex Delivery Anes PTL Lv   1 Current                            Medical History:   Past Medical History:   Diagnosis Date     Diabetes (H)       w/ first pregnancy-on insulin   . Gestational Age 35w3d. VSS. Cervix: not examined.  Fetal movement present. Patient denies cramping, backache, vaginal discharge, pelvic pressure, UTI symptoms, GI problems, bloody show, vaginal bleeding, edema, headache, visual disturbances, epigastric or URQ pain, abdominal pain.Support persons  present.  Action: Verbal consent for EFM. Triage assessment completed. EFM applied for Fetal wellbeing. Uterine assessment done with toco. Fetal assessment: Presumed adequate fetal oxygenation documented (see flow record).Amnisure collected and sent to lab.Patient instructed to report change in fetal movement, vaginal leaking of fluid or bleeding, abdominal pain, or any concerns related to the pregnancy to her nurse/physician.   Response: Dr. Patrick informed of pt status; Amnisure negative. Plan per provider is DC home. Patient verbalized understanding of education and verbalized agreement with plan. Discharged ambulatory at 0010.

## 2017-10-17 NOTE — PLAN OF CARE
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

## 2017-10-17 NOTE — PROGRESS NOTES
PROBLEM LIST  LABS: Apos/RI Gender is a surprise    1. Gestational diabetes, on insulin: twice weekly BPP, growth US in MFM.    Doing well overall. SHER was borderline low at her last BPP, repeat is scheduled for today. She had an increase in discharge last night, was seen in L&D to evaluate for ROM. All tests negative. Today, no leakage of fluid noted, no vaginal bleeding or contractions. BS have been normal on Humulin 3 U qhs. Plan Group B Strep next week.    Disha Tsang MD

## 2017-10-17 NOTE — DISCHARGE INSTRUCTIONS
Discharge Instruction for Undelivered Patients      You were seen for: Membrane Assessment  We Consulted: Dr Patrick  You had (Test or Medicine):Amnisure and EFM.     Diet:   Drink 8 to 12 glasses of liquids (milk, juice, water) every day.  You may eat meals and snacks.  To manager your diabetes, follow the guidelines for eating and drinking given to you by your Clinic Provider or Diabetes Educator.       Activity:  Call your doctor or nurse midwife if your baby is moving less than usual.     Call your provider if you notice:  Swelling in your face or increased swelling in your hands or legs.  Headaches that are not relieved by Tylenol (acetaminophen).  Changes in your vision (blurring: seeing spots or stars.)  Nausea (sick to your stomach) and vomiting (throwing up).   Weight gain of 5 pounds or more per week.  Heartburn that doesn't go away.  Signs of bladder infection: pain when you urinate (use the toilet), need to go more often and more urgently.  The bag of rodgers (rupture of membranes) breaks, or you notice leaking in your underwear.  Bright red blood in your underwear.  Abdominal (lower belly) or stomach pain.  For first baby: Contractions (tightening) less than 5 minutes apart for one hour or more.  Increase or change in vaginal discharge (note the color and amount)      Follow-up:  As scheduled in the clinic

## 2017-10-20 ENCOUNTER — OFFICE VISIT (OUTPATIENT)
Dept: MATERNAL FETAL MEDICINE | Facility: CLINIC | Age: 27
End: 2017-10-20
Attending: OBSTETRICS & GYNECOLOGY
Payer: COMMERCIAL

## 2017-10-20 ENCOUNTER — HOSPITAL ENCOUNTER (OUTPATIENT)
Dept: ULTRASOUND IMAGING | Facility: CLINIC | Age: 27
Discharge: HOME | End: 2017-10-20
Attending: OBSTETRICS & GYNECOLOGY | Admitting: OBSTETRICS & GYNECOLOGY
Payer: COMMERCIAL

## 2017-10-20 DIAGNOSIS — O24.414 INSULIN CONTROLLED GESTATIONAL DIABETES MELLITUS (GDM) IN THIRD TRIMESTER: ICD-10-CM

## 2017-10-20 DIAGNOSIS — O24.414 INSULIN CONTROLLED GESTATIONAL DIABETES MELLITUS (GDM) IN THIRD TRIMESTER: Primary | ICD-10-CM

## 2017-10-20 PROCEDURE — 76819 FETAL BIOPHYS PROFIL W/O NST: CPT

## 2017-10-23 NOTE — TELEPHONE ENCOUNTER
Jeffery Patrick MD   [facility] Ob Gyn Triage 59 minutes ago (4:16 PM)                 Okay to switch; dosing per diabetic nurse or managing provider (Routing comment)               Call to pharmacy. Updated. They will make change.

## 2017-10-24 ENCOUNTER — PRENATAL OFFICE VISIT (OUTPATIENT)
Dept: OBGYN | Facility: CLINIC | Age: 27
End: 2017-10-24
Payer: COMMERCIAL

## 2017-10-24 ENCOUNTER — OFFICE VISIT (OUTPATIENT)
Dept: MATERNAL FETAL MEDICINE | Facility: CLINIC | Age: 27
End: 2017-10-24
Attending: OBSTETRICS & GYNECOLOGY
Payer: COMMERCIAL

## 2017-10-24 ENCOUNTER — HOSPITAL ENCOUNTER (OUTPATIENT)
Dept: ULTRASOUND IMAGING | Facility: CLINIC | Age: 27
Discharge: HOME | End: 2017-10-24
Attending: OBSTETRICS & GYNECOLOGY | Admitting: OBSTETRICS & GYNECOLOGY
Payer: COMMERCIAL

## 2017-10-24 VITALS — BODY MASS INDEX: 36.31 KG/M2 | DIASTOLIC BLOOD PRESSURE: 74 MMHG | SYSTOLIC BLOOD PRESSURE: 120 MMHG | WEIGHT: 205 LBS

## 2017-10-24 DIAGNOSIS — O24.414 INSULIN CONTROLLED GESTATIONAL DIABETES MELLITUS (GDM) IN THIRD TRIMESTER: ICD-10-CM

## 2017-10-24 DIAGNOSIS — O24.414 INSULIN CONTROLLED GESTATIONAL DIABETES MELLITUS (GDM) DURING PREGNANCY, ANTEPARTUM: Primary | ICD-10-CM

## 2017-10-24 DIAGNOSIS — O24.419 GDM, CLASS A2: ICD-10-CM

## 2017-10-24 DIAGNOSIS — Z34.00 SUPERVISION OF NORMAL FIRST PREGNANCY, ANTEPARTUM: Primary | ICD-10-CM

## 2017-10-24 PROBLEM — O09.619 HIGH RISK PREGNANCY IN YOUNG PRIMIGRAVIDA: Status: ACTIVE | Noted: 2017-10-24

## 2017-10-24 PROBLEM — Z36.89 ENCOUNTER FOR TRIAGE IN PREGNANT PATIENT: Status: RESOLVED | Noted: 2017-10-16 | Resolved: 2017-10-24

## 2017-10-24 PROCEDURE — 87653 STREP B DNA AMP PROBE: CPT | Performed by: OBSTETRICS & GYNECOLOGY

## 2017-10-24 PROCEDURE — 76819 FETAL BIOPHYS PROFIL W/O NST: CPT

## 2017-10-24 PROCEDURE — 99207 ZZC PRENATAL VISIT: CPT | Performed by: OBSTETRICS & GYNECOLOGY

## 2017-10-24 NOTE — MR AVS SNAPSHOT
After Visit Summary   10/24/2017    Paulo Lema    MRN: [id number]           Patient Information     Date Of Birth          1990        Visit Information        Provider Department      10/24/2017 1:15 PM Isaiah Madrid MD [facility]        Today's Diagnoses     Supervision of normal first pregnancy, antepartum    -  1    GDM, class A2          Care Instructions    You can reach your [place] Care Team any time of the day by calling [phone]. This number will put you in touch with the 24 hour nurse line if the clinic is closed.    To contact your OB/GYN Station Coordinator/Surgery Scheduler please call [phone]. This is a direct number for your care team between 8 a.m. and 4 p.m. Monday through Friday.    [facility] is open for your convenience:  Monday through Friday 8 a.m. to 6 p.m.  Closed weekends and all major holidays.            Follow-ups after your visit        Follow-up notes from your care team     Return in about 1 week (around 10/31/2017) for prenatal Visit.      Your next 10 appointments already scheduled     Oct 27, 2017  3:00 PM CDT   VAUGHN BPP SINGLE with RHMFMUSR2   [facility] Maternal [facility]etal [facility] Ultrasound - [facility])    [address]  [facility] [address] [phone]            Oct 27, 2017  3:30 PM CDT   Radiology MD with RH VAUGHN BAUTISTA   [facility]etal [facility] - [facility])    [address]  [facility] [address] [phone]           Please arrive at the time given for your first appointment. This visit is used internally to schedule the physician's time during your ultrasound.            Oct 31, 2017  8:00 AM CDT   MFM US COMPRE SINGLE F/U with RHMFMUSR3   [facility]etal [facility]trasound - [facility])    [address] [facility] [address]  [facility] [address] [phone]           Wear comfortable clothes  "and leave your valuables at home.            Oct 31, 2017  8:30 AM CDT   Radiology MD with  VAUGHN BAUTISTA   [facility] Maternal Fetal Medicine [facility] ([facility])    [address] [facility] [address]  [facility] [address] [phone]           Please arrive at the time given for your first appointment. This visit is used internally to schedule the physician's time during your ultrasound.            Oct 31, 2017  9:15 AM CDT   ESTABLISHED PRENATAL with Disha Tsang MD   [facility]    [address]  [facility] [address] [phone]            Nov 07, 2017  9:00 AM CST   ESTABLISHED PRENATAL with Disha Tsang MD   [facility] ([facility])    [address]  [facility] [address] [phone]              Who to contact     If you have questions or need follow up information about today's clinic visit or your schedule please contact [facility] directly at [phone].  Normal or non-critical lab and imaging results will be communicated to you by Dayforcehart, letter or phone within 4 business days after the clinic has received the results. If you do not hear from us within 7 days, please contact the clinic through gumit or phone. If you have a critical or abnormal lab result, we will notify you by phone as soon as possible.  Submit refill requests through Clone or call your pharmacy and they will forward the refill request to us. Please allow 3 business days for your refill to be completed.          Additional Information About Your Visit        Clone Information     Clone lets you send messages to your doctor, view your test results, renew your prescriptions, schedule appointments and more. To sign up, go to [email or web address] . Click on \"Log in\" on the left side of the screen, which will take you to the Welcome page. Then click on \"Sign up Now\" on the right side of " the page.     You will be asked to enter the access code listed below, as well as some personal information. Please follow the directions to create your username and password.     Your access code is: [email or web address]  Expires: 2018  3:28 PM     Your access code will  in 90 days. If you need help or a new code, please call your [place] clinic or [phone].        Care EveryWhere ID     This is your Care EveryWhere ID. This could be used by other organizations to access your [place] medical records  [id number]        Your Vitals Were     Last Period Breastfeeding? BMI (Body Mass Index)             02/10/2017 (Exact Date) Unknown 36.31 kg/m2          Blood Pressure from Last 3 Encounters:   10/24/17 120/74   10/17/17 128/70   10/16/17 106/55    Weight from Last 3 Encounters:   10/24/17 205 lb (93 kg)   10/17/17 201 lb (91.2 kg)   10/03/17 198 lb 1.6 oz (89.9 kg)              We Performed the Following     Group B strep PCR        Primary Care Provider    Physician No Ref-Primary       NO REF-PRIMARY PHYSICIAN        Equal Access to Services     [facility]: Hadii aad ku hadasho Soelpidio, waaxda lupaddy, qaybta kaalmada adeyuko, sidra gresham . So [facility] [phone].    ATENCIÓN: Si habla español, tiene a golden disposición servicios gratuitos de asistencia lingüística. Llame al [phone].    We comply with applicable federal civil rights laws and [place] laws. We do not discriminate on the basis of race, color, national origin, age, disability, sex, sexual orientation, or gender identity.            Thank you!     Thank you for choosing [facility]  for your care. Our goal is always to provide you with excellent care. Hearing back from our patients is one way we can continue to improve our services. Please take a few minutes to complete the written survey that you may receive in the mail after your visit with us. Thank you!             Your Updated  Medication List - Protect others around you: Learn how to safely use, store and throw away your medicines at www.disposemymeds.org.          This list is accurate as of: 10/24/17  1:47 PM.  Always use your most recent med list.                   Brand Name Dispense Instructions for use Diagnosis    acetone (Urine) test Strp     100 each    Test once daily x1 week, then reduce to once weekly if consistently negative    Gestational diabetes       DACIA CONTOUR NEXT test strip   Generic drug:  blood glucose monitoring     300 strip    USE TO TEST BLOOD SUGAR FOUR TIMES DAILY OR AS DIRECTED    Gestational diabetes       blood glucose monitoring lancets     100 each    Use to test blood sugar 4 times daily or as directed.    Gestational diabetes       doxylamine 25 MG Tabs tablet    UNISOM     Take 25 mg by mouth At Bedtime        insulin isophane human 100 UNIT/ML injection    HumuLIN N PEN    30 mL    Inject up to 12 units before bed every day.    Insulin controlled gestational diabetes mellitus (GDM) in third trimester       insulin pen needle 32G X 4 MM    INSUPEN PEN NEEDLES    100 each    Use 1 pen needle daily or as directed.    Gestational diabetes       prenatal multivitamin plus iron 27-0.8 MG Tabs per tablet      Take 1 tablet by mouth daily        VITAMIN B6 PO

## 2017-10-24 NOTE — PROGRESS NOTES
Please see full imaging report from ViewPoint program under imaging tab.    BPP 8/8    Estephanie Cueva MD  Maternal Fetal Medicine

## 2017-10-24 NOTE — PATIENT INSTRUCTIONS
You can reach your [place] Care Team any time of the day by calling [phone]. This number will put you in touch with the 24 hour nurse line if the clinic is closed.    To contact your OB/GYN Station Coordinator/Surgery Scheduler please call [phone]. This is a direct number for your care team between 8 a.m. and 4 p.m. Monday through Friday.    [facility] is open for your convenience:  Monday through Friday 8 a.m. to 6 p.m.  Closed weekends and all major holidays.

## 2017-10-24 NOTE — MR AVS SNAPSHOT
After Visit Summary   10/24/2017    Paulo Lema    MRN: [id number]           Patient Information     Date Of Birth          1990        Visit Information        Provider Department      10/24/2017 12:15 PM Estephanie Cueva MD [facility] Maternal Fetal Medicine [facility]        Today's Diagnoses     Insulin controlled gestational diabetes mellitus (GDM) during pregnancy, antepartum    -  1       Follow-ups after your visit        Your next 10 appointments already scheduled     Oct 24, 2017 12:15 PM CDT   Radiology MD with Estephanie Cueva MD   [facility] Maternal Fetal [facility])    [address]  [facility] [address]   [phone]           Please arrive at the time given for your first appointment. This visit is used internally to schedule the physician's time during your ultrasound.            Oct 24, 2017  1:15 PM CDT   ESTABLISHED PRENATAL with Isaiah Madrid MD   [facility] ([facility])    [address]  [facility] [address]   [phone]            Oct 27, 2017  3:00 PM CDT   MFM BPP SINGLE with [id number]   [facility] Maternal Fetal Medicine Ultrasound - [facility])    [address]  [facility] [address]   [phone]            Oct 27, 2017  3:30 PM CDT   Radiology MD with  MFMICHELLE BAUTISTA   [facility] Maternal Fetal Medicine [facility])    [address]  [facility] [address]   [phone]           Please arrive at the time given for your first appointment. This visit is used internally to schedule the physician's time during your ultrasound.            Oct 31, 2017  8:00 AM CDT   MFM US COMPRE SINGLE F/U with [id number]   [facility] Maternal Fetal Medicine Ultrasound [facility])    [address] [facility] [address]  [facility] [address]   [phone]           Wear comfortable clothes  "and leave your valuables at home.            Oct 31, 2017  8:30 AM CDT   Radiology MD with  VAUGHN BAUTISTA   [facility] Maternal [facility] ([facility])    [address]  [facility] [address] [phone]           Please arrive at the time given for your first appointment. This visit is used internally to schedule the physician's time during your ultrasound.            Oct 31, 2017  9:15 AM CDT   ESTABLISHED PRENATAL with Disha Tsagn MD   [facility]    [address]  [facility] [address] [phone]            Nov 07, 2017  9:00 AM CST   ESTABLISHED PRENATAL with Disha Tsang MD   [facility]    [address] [place]  [facility] [address] [phone]              Who to contact     If you have questions or need follow up information about today's clinic visit or your schedule please contact [facility] MATERNAL FETAL [facility] directly at [phone].  Normal or non-critical lab and imaging results will be communicated to you by Rangespanhart, letter or phone within 4 business days after the clinic has received the results. If you do not hear from us within 7 days, please contact the clinic through Rangespanhart or phone. If you have a critical or abnormal lab result, we will notify you by phone as soon as possible.  Submit refill requests through Nextpeer or call your pharmacy and they will forward the refill request to us. Please allow 3 business days for your refill to be completed.          Additional Information About Your Visit        RangespanharLandscape Mobile Information     Nextpeer lets you send messages to your doctor, view your test results, renew your prescriptions, schedule appointments and more. To sign up, go to [email or web address] . Click on \"Log in\" on the left side of the screen, which will take you to the Welcome page. Then click on \"Sign up Now\" on the " right side of the page.     You will be asked to enter the access code listed below, as well as some personal information. Please follow the directions to create your username and password.     Your access code is: [email or web address]  Expires: 2018  3:28 PM     Your access code will  in 90 days. If you need help or a new code, please call your [place] clinic or [phone].        Care EveryWhere ID     This is your Care EveryWhere ID. This could be used by other organizations to access your [facility] medical records  [id number]        Your Vitals Were     Last Period                   02/10/2017 (Exact Date)            Blood Pressure from Last 3 Encounters:   10/17/17 128/70   10/16/17 106/55   10/03/17 118/82    Weight from Last 3 Encounters:   10/17/17 91.2 kg (201 lb)   10/03/17 89.9 kg (198 lb 1.6 oz)   17 89.4 kg (197 lb)              Today, you had the following     No orders found for display       Primary Care Provider    Physician No Ref-Primary       NO REF-PRIMARY PHYSICIAN        Equal Access to Services     [facility]: Hadii fátima Martínez, waaxda massimo, qaybta caitlyn meek, sidra gresham . [facility] [phone].    ATENCIÓN: Si habla español, tiene a golden disposición servicios gratuitos de asistencia lingüística. Kristine al [phone].    We comply with applicable federal civil rights laws and [place] laws. We do not discriminate on the basis of race, color, national origin, age, disability, sex, sexual orientation, or gender identity.            Thank you!     Thank you for choosing [facility]  for your care. Our goal is always to provide you with excellent care. Hearing back from our patients is one way we can continue to improve our services. Please take a few minutes to complete the written survey that you may receive in the mail after your visit with us. Thank you!             Your Updated Medication List -  Protect others around you: Learn how to safely use, store and throw away your medicines at www.disposemymeds.org.          This list is accurate as of: 10/24/17 12:14 PM.  Always use your most recent med list.                   Brand Name Dispense Instructions for use Diagnosis    acetone (Urine) test Strp     100 each    Test once daily x1 week, then reduce to once weekly if consistently negative    Gestational diabetes       DACIA CONTOUR NEXT test strip   Generic drug:  blood glucose monitoring     300 strip    USE TO TEST BLOOD SUGAR FOUR TIMES DAILY OR AS DIRECTED    Gestational diabetes       blood glucose monitoring lancets     100 each    Use to test blood sugar 4 times daily or as directed.    Gestational diabetes       doxylamine 25 MG Tabs tablet    UNISOM     Take 25 mg by mouth At Bedtime        insulin isophane human 100 UNIT/ML injection    HumuLIN N PEN    30 mL    Inject up to 12 units before bed every day.    Insulin controlled gestational diabetes mellitus (GDM) in third trimester       insulin pen needle 32G X 4 MM    INSUPEN PEN NEEDLES    100 each    Use 1 pen needle daily or as directed.    Gestational diabetes       prenatal multivitamin plus iron 27-0.8 MG Tabs per tablet      Take 1 tablet by mouth daily        VITAMIN B6 PO

## 2017-10-24 NOTE — PROGRESS NOTES
Paulo Lema is a 26 year old female  Estimated Date of Delivery: 17 At 36-3/7 weeks gestation presents today for prenatal care.  Pregnancy complicated by White's classification A2 gestational diabetes. This was increased to 6 units of NPH at bedtime.  Her fasting blood sugars have been within acceptable ranges. Her one-hour postprandials have been less than or equal to 125. Patient biophysical profile score today of 8 out of 8 with a normal SHER    A/P:  Intrauterine pregnancy 36-3/7 weeks gestation White's classification A2 gestational diabetes on 6 units of NPH at bedtime doing well  Will continue with twice weekly biophysical profiles weekly office visits. GBS was done today. Patient is scheduled for induction of labor at 39 weeks which would be 17.

## 2017-10-24 NOTE — NURSING NOTE
"Chief Complaint   Patient presents with     Prenatal Care       Initial /74  Wt 205 lb (93 kg)  LMP 02/10/2017 (Exact Date)  BMI 36.31 kg/m2 Estimated body mass index is 36.31 kg/(m^2) as calculated from the following:    Height as of 10/17/17: 5' 3\" (1.6 m).    Weight as of this encounter: 205 lb (93 kg).  Medication Reconciliation: complete     36w3d  States feeling good, No concerns.  Having good fetal movements.    Fluid is at a 10      Dayana Little [facility]       "

## 2017-10-25 DIAGNOSIS — O24.414 INSULIN CONTROLLED GESTATIONAL DIABETES MELLITUS (GDM) IN THIRD TRIMESTER: ICD-10-CM

## 2017-10-25 LAB
GP B STREP DNA SPEC QL NAA+PROBE: NEGATIVE
SPECIMEN SOURCE: NORMAL

## 2017-10-26 ENCOUNTER — TELEPHONE (OUTPATIENT)
Dept: EDUCATION SERVICES | Facility: CLINIC | Age: 27
End: 2017-10-26

## 2017-10-26 NOTE — TELEPHONE ENCOUNTER
Fill of insulin was issued on 10/9/17.   Called Pt to see if she actually needs refill.   The Pt states she has already picked up her medication and is not needing a refill.   Denial sent to pharmacy with note.     Judy Steve RN -- [facility]

## 2017-10-26 NOTE — TELEPHONE ENCOUNTER
Gestational Diabetes Follow-up    Subjective/Objective:    Paulo Lema sent in blood glucose log for review. Last date of communication was: 10/9/17.    Gestational diabetes is being managed with Humulin/Novolin NPH Insulin 6 units at bedtime    Estimated Date of Delivery: 11/18/17    BG/Food Log:   Good morning,   My name is Paulo Lema and my date of birth is [birth year].   Here are my blood sugar numbers.   I have been nauseous since yesterday. In the middle of the night it was so bad that I couldn t really sleep so around 2am I had a snack to see if that would help. I had celery with peanut butter and apple sauce. I was surprised the apple sauce didn t make my blood sugar high this morning. Is there anything I can take to help my nausea?     Thank you,   Paulo    Assessment:    Ketones:not reported.   Fasting blood glucoses: 100% in target.  After breakfast: 100% in target.  After lunch: 100% in target.  After dinner: 100% in target.    Plan/Response:  Dave Bates,   Your numbers are looking good!  If the nausea just happens at night I would check blood sugar just to make sure it is not dropping towards low end. Occasionally that can cause nausea. You could try ginger ale or unsweetened carbonated water with lemon. Make sure you are getting enough water and adequate rest.  Cochran carbs such as saltine crackers might help.    Contact us with any further questions or if nausea persists talk with your doctor.  Otherwise send us an update in a week.     Nguyen Perez RDN, LAURA, CDE      Any diabetes medication dose changes were made via the CDE Protocol and Collaborative Practice Agreement with the patient's OB/GYN provider. A copy of this encounter was shared with the provider.

## 2017-10-27 ENCOUNTER — OFFICE VISIT (OUTPATIENT)
Dept: MATERNAL FETAL MEDICINE | Facility: CLINIC | Age: 27
End: 2017-10-27
Attending: OBSTETRICS & GYNECOLOGY
Payer: COMMERCIAL

## 2017-10-27 ENCOUNTER — HOSPITAL ENCOUNTER (OUTPATIENT)
Dept: ULTRASOUND IMAGING | Facility: CLINIC | Age: 27
Discharge: HOME | End: 2017-10-27
Attending: OBSTETRICS & GYNECOLOGY | Admitting: OBSTETRICS & GYNECOLOGY
Payer: COMMERCIAL

## 2017-10-27 DIAGNOSIS — O24.414 INSULIN CONTROLLED GESTATIONAL DIABETES MELLITUS (GDM) IN THIRD TRIMESTER: ICD-10-CM

## 2017-10-27 DIAGNOSIS — O24.414 INSULIN CONTROLLED GESTATIONAL DIABETES MELLITUS (GDM) IN THIRD TRIMESTER: Primary | ICD-10-CM

## 2017-10-27 PROCEDURE — 76819 FETAL BIOPHYS PROFIL W/O NST: CPT

## 2017-10-27 NOTE — MR AVS SNAPSHOT
After Visit Summary   10/27/2017    Paulo Lema    MRN: [id number]           Patient Information     Date Of Birth          1990        Visit Information        Provider Department      10/27/2017 3:30 PM Sindy Alvarez MD [facility] Maternal Fetal Medicine [facility]        Today's Diagnoses     Insulin controlled gestational diabetes mellitus (GDM) in third trimester    -  1       Follow-ups after your visit        Your next 10 appointments already scheduled     Oct 31, 2017  8:00 AM CDT   MFM US COMPRE SINGLE F/U with [id number]   [facility] Maternal Fetal Medicine Ultrasound - [facility] ([facility])    [address] [place] [address]   [phone]           Wear comfortable clothes and leave your valuables at home.            Oct 31, 2017  8:30 AM CDT   Radiology MD with CORRY MENDES MD   [facility] Maternal Fetal Medicine [facility] ([facility])    [address] [place] [address]   [phone]           Please arrive at the time given for your first appointment. This visit is used internally to schedule the physician's time during your ultrasound.            Oct 31, 2017  9:15 AM CDT   ESTABLISHED PRENATAL with Disha Tsang MD   [facility]    [address] [place]  [facility] [address]   [phone]            Nov 03, 2017  3:30 PM CDT   MFM BPP SINGLE with [id number]   [facility] Maternal Fetal Medicine Ultrasound - [facility] ([facility])    [address]  [facility] [address]   [phone]            Nov 03, 2017  4:00 PM CDT   Radiology MD with RH VAUGHN BAUTISTA   [facility] Maternal Fetal Medicine [facility] ([facility])    [address]  [facility] [address]   [phone]           Please arrive at the time given for your first appointment. This visit is used internally to schedule the physician's time during your  ultrasound.            Nov 07, 2017  8:00 AM CST   MFM BPP SINGLE with [id number]   [facility] Maternal Fetal Medicine Ultrasound - [facility] ([facility])    [address]  [facility] [address]   [phone]            Nov 07, 2017  8:30 AM CST   Radiology MD with RH VAUGHN BAUTISTA   [facility] ([facility])    [address]  [facility] [address]   [phone]           Please arrive at the time given for your first appointment. This visit is used internally to schedule the physician's time during your ultrasound.            Nov 07, 2017  9:00 AM CST   ESTABLISHED PRENATAL with Disha Tsang MD   [facility] ([facility])    [address] [place]  [facility] [address]   [phone]            Nov 10, 2017  3:30 PM CST   MFM BPP SINGLE with [id number]   [facility] Fetal [facility] Ultrasound - [facility])    [address]  [facility] [address]   [phone]            Nov 10, 2017  4:00 PM CST   Radiology MD with RH VAUGHN BAUTISTA   [facility] ([facility])    [address]  [facility] [address]   [phone]           Please arrive at the time given for your first appointment. This visit is used internally to schedule the physician's time during your ultrasound.              Future tests that were ordered for you today     Open Future Orders        Priority Expected Expires Ordered    MFM BPP Single Routine 11/3/2017 8/27/2018 10/27/2017    MFM BPP Single Routine 11/7/2017 8/27/2018 10/27/2017    MFM BPP Single Routine 11/7/2017 8/27/2018 10/27/2017            Who to contact     If you have questions or need follow up information about today's clinic visit or your schedule please contact [facility] F[facility]AL [facility] directly at [phone].  Normal or non-critical lab and  "imaging results will be communicated to you by MyChart, letter or phone within 4 business days after the clinic has received the results. If you do not hear from us within 7 days, please contact the clinic through Ciapplet or phone. If you have a critical or abnormal lab result, we will notify you by phone as soon as possible.  Submit refill requests through Bit Cauldron or call your pharmacy and they will forward the refill request to us. Please allow 3 business days for your refill to be completed.          Additional Information About Your Visit        ClusterFlunkharVirtuOz Information     Bit Cauldron lets you send messages to your doctor, view your test results, renew your prescriptions, schedule appointments and more. To sign up, go to [email or web address] . Click on \"Log in\" on the left side of the screen, which will take you to the Welcome page. Then click on \"Sign up Now\" on the right side of the page.     You will be asked to enter the access code listed below, as well as some personal information. Please follow the directions to create your username and password.     Your access code is: [email or web address]  Expires: 2018  3:28 PM     Your access code will  in 90 days. If you need help or a new code, please call your [facility] clinic or [phone].        Care EveryWhere ID     This is your Care EveryWhere ID. This could be used by other organizations to access your [facility] medical records  [id number]        Your Vitals Were     Last Period                   02/10/2017 (Exact Date)            Blood Pressure from Last 3 Encounters:   10/24/17 120/74   10/17/17 128/70   10/16/17 106/55    Weight from Last 3 Encounters:   10/24/17 93 kg (205 lb)   10/17/17 91.2 kg (201 lb)   10/03/17 89.9 kg (198 lb 1.6 oz)               Primary Care Provider    Physician No Ref-Primary       NO REF-PRIMARY PHYSICIAN        Equal Access to Services     [facility]: Hadii fátima Martínez, tova keys, qasanford brady " sidra meekhilda villaaan ah. [facility] [phone].    ATENCIÓN: Si habla bro, tiene a golden disposición servicios gratuitos de asistencia lingüística. Kristine al [phone].    We comply with applicable federal civil rights laws and [place] laws. We do not discriminate on the basis of race, color, national origin, age, disability, sex, sexual orientation, or gender identity.            Thank you!     Thank you for choosing [facility]  for your care. Our goal is always to provide you with excellent care. Hearing back from our patients is one way we can continue to improve our services. Please take a few minutes to complete the written survey that you may receive in the mail after your visit with us. Thank you!             Your Updated Medication List - Protect others around you: Learn how to safely use, store and throw away your medicines at www.disposemymeds.org.          This list is accurate as of: 10/27/17  3:36 PM.  Always use your most recent med list.                   Brand Name Dispense Instructions for use Diagnosis    acetone (Urine) test Strp     100 each    Test once daily x1 week, then reduce to once weekly if consistently negative    Gestational diabetes       DACIA CONTOUR NEXT test strip   Generic drug:  blood glucose monitoring     300 strip    USE TO TEST BLOOD SUGAR FOUR TIMES DAILY OR AS DIRECTED    Gestational diabetes       blood glucose monitoring lancets     100 each    Use to test blood sugar 4 times daily or as directed.    Gestational diabetes       doxylamine 25 MG Tabs tablet    UNISOM     Take 25 mg by mouth At Bedtime        insulin isophane human 100 UNIT/ML injection    HumuLIN N PEN    30 mL    Inject up to 12 units before bed every day.    Insulin controlled gestational diabetes mellitus (GDM) in third trimester       insulin pen needle 32G X 4 MM    INSUPEN PEN NEEDLES    100 each    Use 1 pen needle daily or as directed.     Gestational diabetes       prenatal multivitamin plus iron 27-0.8 MG Tabs per tablet      Take 1 tablet by mouth daily        VITAMIN B6 PO

## 2017-10-31 ENCOUNTER — HOSPITAL ENCOUNTER (OUTPATIENT)
Dept: ULTRASOUND IMAGING | Facility: CLINIC | Age: 27
Discharge: HOME | End: 2017-10-31
Attending: OBSTETRICS & GYNECOLOGY | Admitting: OBSTETRICS & GYNECOLOGY
Payer: COMMERCIAL

## 2017-10-31 ENCOUNTER — OFFICE VISIT (OUTPATIENT)
Dept: MATERNAL FETAL MEDICINE | Facility: CLINIC | Age: 27
End: 2017-10-31
Attending: OBSTETRICS & GYNECOLOGY
Payer: COMMERCIAL

## 2017-10-31 ENCOUNTER — PRENATAL OFFICE VISIT (OUTPATIENT)
Dept: OBGYN | Facility: CLINIC | Age: 27
End: 2017-10-31
Payer: COMMERCIAL

## 2017-10-31 VITALS — DIASTOLIC BLOOD PRESSURE: 70 MMHG | WEIGHT: 206.6 LBS | SYSTOLIC BLOOD PRESSURE: 120 MMHG | BODY MASS INDEX: 36.6 KG/M2

## 2017-10-31 DIAGNOSIS — O24.414 INSULIN CONTROLLED GESTATIONAL DIABETES MELLITUS (GDM) IN THIRD TRIMESTER: ICD-10-CM

## 2017-10-31 DIAGNOSIS — O24.414 INSULIN CONTROLLED GESTATIONAL DIABETES MELLITUS (GDM) IN THIRD TRIMESTER: Primary | ICD-10-CM

## 2017-10-31 PROCEDURE — 76819 FETAL BIOPHYS PROFIL W/O NST: CPT | Performed by: OBSTETRICS & GYNECOLOGY

## 2017-10-31 PROCEDURE — 99207 ZZC PRENATAL VISIT: CPT | Performed by: OBSTETRICS & GYNECOLOGY

## 2017-10-31 PROCEDURE — 76816 OB US FOLLOW-UP PER FETUS: CPT

## 2017-10-31 NOTE — MR AVS SNAPSHOT
After Visit Summary   10/31/2017    Paulo Lema    MRN: [id number]           Patient Information     Date Of Birth          1990        Visit Information        Provider Department      10/31/2017 9:15 AM Disha Tsang MD [facility]        Today's Diagnoses     Insulin controlled gestational diabetes mellitus (GDM) in third trimester    -  1       Follow-ups after your visit        Your next 10 appointments already scheduled     Nov 03, 2017  3:30 PM CDT   MFM BPP SINGLE with [id number]   [facility] - [facility])    [address]  [facility] [address]   [phone]            Nov 03, 2017  4:00 PM CDT   Radiology MD with RH VAUGHN BAUTISTA   [facility] Maternal Fetal Medicine [facility])    [address]  [facility] [address]   [phone]           Please arrive at the time given for your first appointment. This visit is used internally to schedule the physician's time during your ultrasound.            Nov 07, 2017  8:00 AM CST   MFM BPP SINGLE with [id number]   [facility])    [address]  [facility] [address]   [phone]            Nov 07, 2017  8:30 AM CST   Radiology MD with RH MFMICHELLE BAUTISTA   [facility]etal [facility])    [address]  [facility] [address]   [phone]           Please arrive at the time given for your first appointment. This visit is used internally to schedule the physician's time during your ultrasound.            Nov 07, 2017  9:00 AM CST   ESTABLISHED PRENATAL with Disha Tsang MD   [facility] ([facility])    [address]  [facility] [address]   [phone]            Nov 10, 2017  3:30 PM CST   MFM BPP SINGLE with [id number]   [facility] - [facility] ([facility])    [address]  [facility] [address]   [phone]            Nov 10, 2017  4:00 PM CST   Radiology MD with  VAUGHN BAUTISTA   [facility] ([facility])    [address]  [facility] [address]   [phone]           Please arrive at the time given for your first appointment. This visit is used internally to schedule the physician's time during your ultrasound.              Who to contact     If you have questions or need follow up information about today's clinic visit or your schedule please contact [facility] directly at [phone].  Normal or non-critical lab and imaging results will be communicated to you by Pact Fitnesshart, letter or phone within 4 business days after the clinic has received the results. If you do not hear from us within 7 days, please contact the clinic through Pact Fitnesshart or phone. If you have a critical or abnormal lab result, we will notify you by phone as soon as possible.  Submit refill requests through GFS IT or call your pharmacy and they will forward the refill request to us. Please allow 3 business days for your refill to be completed.          Additional Information About Your Visit        GFS IT Information     GFS IT lets you send messages to your doctor, view your test results, renew your prescriptions, schedule appointments and more. To sign up, go to [email or web address] . Click on \"Log in\" on the left side of the screen, which will take you to the Welcome page. Then click on \"Sign up Now\" on the right side of the page.     You will be asked to enter the access code listed below, as well as some personal information. Please follow the directions to create your username and password.     Your access code is: [email or web address]  Expires: 2018  3:28 PM     Your access code will  in 90 days. If you need help or a " new code, please call your [place] clinic or [phone].        Care EveryWhere ID     This is your Care EveryWhere ID. This could be used by other organizations to access your [place] medical records  [id number]        Your Vitals Were     Last Period BMI (Body Mass Index)                02/10/2017 (Exact Date) 36.6 kg/m2           Blood Pressure from Last 3 Encounters:   10/31/17 120/70   10/24/17 120/74   10/17/17 128/70    Weight from Last 3 Encounters:   10/31/17 206 lb 9.6 oz (93.7 kg)   10/24/17 205 lb (93 kg)   10/17/17 201 lb (91.2 kg)              Today, you had the following     No orders found for display       Primary Care Provider    Physician No Ref-Primary       NO REF-PRIMARY PHYSICIAN        Equal Access to Services     LINDSAY RODRIGUEZ : Hadii fátima parra Soelpidio, waaxda luqadaha, qaybta kaalmada vidayashahram, sidra gresham . So [facility] [phone].    ATENCIÓN: Si habla español, tiene a golden disposición servicios gratuitos de asistencia lingüística. Llame al [phone].    We comply with applicable federal civil rights laws and [place] laws. We do not discriminate on the basis of race, color, national origin, age, disability, sex, sexual orientation, or gender identity.            Thank you!     Thank you for choosing [facility]  for your care. Our goal is always to provide you with excellent care. Hearing back from our patients is one way we can continue to improve our services. Please take a few minutes to complete the written survey that you may receive in the mail after your visit with us. Thank you!             Your Updated Medication List - Protect others around you: Learn how to safely use, store and throw away your medicines at www.disposemymeds.org.          This list is accurate as of: 10/31/17 10:38 AM.  Always use your most recent med list.                   Brand Name Dispense Instructions for use Diagnosis    acetone (Urine) test Strp      100 each    Test once daily x1 week, then reduce to once weekly if consistently negative    Gestational diabetes       DACIA CONTOUR NEXT test strip   Generic drug:  blood glucose monitoring     300 strip    USE TO TEST BLOOD SUGAR FOUR TIMES DAILY OR AS DIRECTED    Gestational diabetes       blood glucose monitoring lancets     100 each    Use to test blood sugar 4 times daily or as directed.    Gestational diabetes       doxylamine 25 MG Tabs tablet    UNISOM     Take 25 mg by mouth At Bedtime        insulin isophane human 100 UNIT/ML injection    HumuLIN N PEN    30 mL    Inject up to 12 units before bed every day.    Insulin controlled gestational diabetes mellitus (GDM) in third trimester       insulin pen needle 32G X 4 MM    INSUPEN PEN NEEDLES    100 each    Use 1 pen needle daily or as directed.    Gestational diabetes       prenatal multivitamin plus iron 27-0.8 MG Tabs per tablet      Take 1 tablet by mouth daily        VITAMIN B6 PO

## 2017-10-31 NOTE — NURSING NOTE
"Chief Complaint   Patient presents with     Prenatal Care   37w3d  Discuss induction date.  Marleny Almaraz MA    Initial /70 (BP Location: Right arm, Patient Position: Chair, Cuff Size: Adult Large)  Wt 206 lb 9.6 oz (93.7 kg)  LMP 02/10/2017 (Exact Date)  BMI 36.6 kg/m2 Estimated body mass index is 36.6 kg/(m^2) as calculated from the following:    Height as of 10/17/17: 5' 3\" (1.6 m).    Weight as of this encounter: 206 lb 9.6 oz (93.7 kg).  Medication Reconciliation: complete    "

## 2017-10-31 NOTE — MR AVS SNAPSHOT
After Visit Summary   10/31/2017    Paulo Lema    MRN: [id number]           Patient Information     Date Of Birth          1990        Visit Information        Provider Department      10/31/2017 8:30 AM Andry Jaimes MD [facility] Maternal Fetal Medicine [facility]        Today's Diagnoses     Insulin controlled gestational diabetes mellitus (GDM) in third trimester    -  1       Follow-ups after your visit        Your next 10 appointments already scheduled     Oct 31, 2017  9:15 AM CDT   ESTABLISHED PRENATAL with D[facility] Tsang MD   [facility]    [address]  [facility] [address]   [phone]            Nov 03, 2017  3:30 PM CDT   MFM BPP SINGLE with [id number]   [facility] Maternal Fetal Medicine Ultrasound [facility])    [address]  [facility] [address]   [phone]            Nov 03, 2017  4:00 PM CDT   Radiology MD with  VAUGHN BAUTISTA   [facility] Fetal [facility] ([facility])    [address]  [facility] [address]   [phone]           Please arrive at the time given for your first appointment. This visit is used internally to schedule the physician's time during your ultrasound.            Nov 07, 2017  8:00 AM CST   MFM BPP SINGLE with [email or web address]   [facility] Maternal Fetal Medicine Ultrasound - [facility] ([facility])    [address]  [facility] [address]   [phone]            Nov 07, 2017  8:30 AM CST   Radiology MD with  VAUGHN BAUTISTA   [facility] Maternal Fetal Medicine [facility] ([facility])    [address]  [facility] [address]   [phone]           Please arrive at the time given for your first appointment. This visit is used internally to schedule the physician's time during your ultrasound.            Nov 07, 2017  9:00 AM CST   ESTABLISHED PRENATAL with  "Disha Tsang MD   [facility]    [address]  [facility] [address]   [phone]            Nov 10, 2017  3:30 PM CST   MFM BPP SINGLE with [id number]   [facility] Maternal Fetal Medicine Ultrasound - [facility] ([facility])    [address]  [facility] [address] [phone]            Nov 10, 2017  4:00 PM CST   Radiology MD with RH VAUGHN BAUTISTA   [facility] Maternal Fetal Medicine [facility] ([facility])    [address]  [facility] [address] [phone]           Please arrive at the time given for your first appointment. This visit is used internally to schedule the physician's time during your ultrasound.              Who to contact     If you have questions or need follow up information about today's clinic visit or your schedule please contact [facility] MATERNAL FETAL MEDICINE [facility] directly at [phone].  Normal or non-critical lab and imaging results will be communicated to you by TouchTunes Interactive Networkshart, letter or phone within 4 business days after the clinic has received the results. If you do not hear from us within 7 days, please contact the clinic through Animalvitaet or phone. If you have a critical or abnormal lab result, we will notify you by phone as soon as possible.  Submit refill requests through [email or web address] or call your pharmacy and they will forward the refill request to us. Please allow 3 business days for your refill to be completed.          Additional Information About Your Visit        [email or web address] Information     [email or web address] lets you send messages to your doctor, view your test results, renew your prescriptions, schedule appointments and more. To sign up, go to [email or web address] . Click on \"Log in\" on the left side of the screen, which will take you to the Welcome page. Then click on \"Sign up Now\" on the right side of the page.     You will be asked to enter the access code listed below, " as well as some personal information. Please follow the directions to create your username and password.     Your access code is: [email or web address]  Expires: 2018  3:28 PM     Your access code will  in 90 days. If you need help or a new code, please call your [place] clinic or [phone].        Care EveryWhere ID     This is your Care EveryWhere ID. This could be used by other organizations to access your [place] medical records  [id number]        Your Vitals Were     Last Period                   02/10/2017 (Exact Date)            Blood Pressure from Last 3 Encounters:   10/24/17 120/74   10/17/17 128/70   10/16/17 106/55    Weight from Last 3 Encounters:   10/24/17 93 kg (205 lb)   10/17/17 91.2 kg (201 lb)   10/03/17 89.9 kg (198 lb 1.6 oz)              Today, you had the following     No orders found for display       Primary Care Provider    Physician No Ref-Primary       NO REF-PRIMARY PHYSICIAN        Equal Access to Services     [facility]: Hadii aad ku hadasho Soomaali, waaxda luqadaha, qaybta kaalmada adeegyada, waxay adonayin hayhoracion vida gresham . So [facility] [phone].    ATENCIÓN: Si habla español, tiene a golden disposición servicios gratuitos de asistencia lingüística. Llame al [phone].    We comply with applicable federal civil rights laws and [place] laws. We do not discriminate on the basis of race, color, national origin, age, disability, sex, sexual orientation, or gender identity.            Thank you!     Thank you for choosing [facility]  for your care. Our goal is always to provide you with excellent care. Hearing back from our patients is one way we can continue to improve our services. Please take a few minutes to complete the written survey that you may receive in the mail after your visit with us. Thank you!             Your Updated Medication List - Protect others around you: Learn how to safely use, store and throw away your medicines  at www.disposemymeds.org.          This list is accurate as of: 10/31/17  8:59 AM.  Always use your most recent med list.                   Brand Name Dispense Instructions for use Diagnosis    acetone (Urine) test Strp     100 each    Test once daily x1 week, then reduce to once weekly if consistently negative    Gestational diabetes       DACIA CONTOUR NEXT test strip   Generic drug:  blood glucose monitoring     300 strip    USE TO TEST BLOOD SUGAR FOUR TIMES DAILY OR AS DIRECTED    Gestational diabetes       blood glucose monitoring lancets     100 each    Use to test blood sugar 4 times daily or as directed.    Gestational diabetes       doxylamine 25 MG Tabs tablet    UNISOM     Take 25 mg by mouth At Bedtime        insulin isophane human 100 UNIT/ML injection    HumuLIN N PEN    30 mL    Inject up to 12 units before bed every day.    Insulin controlled gestational diabetes mellitus (GDM) in third trimester       insulin pen needle 32G X 4 MM    INSUPEN PEN NEEDLES    100 each    Use 1 pen needle daily or as directed.    Gestational diabetes       prenatal multivitamin plus iron 27-0.8 MG Tabs per tablet      Take 1 tablet by mouth daily        VITAMIN B6 PO

## 2017-10-31 NOTE — PROGRESS NOTES
"Please see \"Imaging\" tab under \"Chart Review\" for details of today's ultrasound.    Andry Jaimes M.D.  Specialist in Maternal-Fetal Medicine     "

## 2017-10-31 NOTE — PROGRESS NOTES
PROBLEM LIST  LABS: Apos/RI Gender is a surprise    1. Gestational diabetes, on insulin: twice weekly BPP, growth US in MFM. IOL scheduled for 11/10 PM ripening and 11/11 IOL.    Doing well overall. SHER normal. On Humulin 6u q pm, excellent control. Discussed risks, benefits, and alternatives to induction of labor and reasons this is recommended at 39 weeks. Scheduled on L&D.    Disha Tsang MD

## 2017-11-03 ENCOUNTER — OFFICE VISIT (OUTPATIENT)
Dept: MATERNAL FETAL MEDICINE | Facility: CLINIC | Age: 27
End: 2017-11-03
Attending: OBSTETRICS & GYNECOLOGY
Payer: COMMERCIAL

## 2017-11-03 ENCOUNTER — HOSPITAL ENCOUNTER (OUTPATIENT)
Dept: ULTRASOUND IMAGING | Facility: CLINIC | Age: 27
Discharge: HOME | End: 2017-11-03
Attending: OBSTETRICS & GYNECOLOGY | Admitting: OBSTETRICS & GYNECOLOGY
Payer: COMMERCIAL

## 2017-11-03 DIAGNOSIS — O24.414 INSULIN CONTROLLED GESTATIONAL DIABETES MELLITUS (GDM) IN THIRD TRIMESTER: ICD-10-CM

## 2017-11-03 DIAGNOSIS — O24.414 INSULIN CONTROLLED GESTATIONAL DIABETES MELLITUS (GDM) IN THIRD TRIMESTER: Primary | ICD-10-CM

## 2017-11-03 PROCEDURE — 76819 FETAL BIOPHYS PROFIL W/O NST: CPT

## 2017-11-03 NOTE — MR AVS SNAPSHOT
After Visit Summary   11/3/2017    Paulo Lema    MRN: [id number]           Patient Information     Date Of Birth          1990        Visit Information        Provider Department      11/3/2017 4:00 PM Tony Modi MD [facility] Maternal Fetal Medicine [facility]        Today's Diagnoses     Insulin controlled gestational diabetes mellitus (GDM) in third trimester    -  1       Follow-ups after your visit        Your next 10 appointments already scheduled     Nov 07, 2017  8:00 AM CST   MFM BPP SINGLE with [id number]   [facility] Maternal Fetal Medicine Ultrasound - [facility] ([facility])    [address]  [facility] [address]   [phone]            Nov 07, 2017  8:30 AM CST   Radiology MD with RH VAUGHN BAUTISTA   [facility] Fetal Medicine [facility])    [address]  [facility] [address]   [phone]           Please arrive at the time given for your first appointment. This visit is used internally to schedule the physician's time during your ultrasound.            Nov 07, 2017  9:00 AM CST   ESTABLISHED PRENATAL with Disha Tsang MD   [facility]    [address]  [facility] [address]   [phone]            Nov 10, 2017  3:30 PM CST   MFM BPP SINGLE with [id number]   [facility] Maternal Fetal Medicine Ultrasound - [facility] ([facility])    [address]  [facility] [address]   [phone]            Nov 10, 2017  4:00 PM CST   Radiology MD with RH VAUGHN BAUTISTA   [facility] Fetal Medicine [facility] ([facility])    [address]  [facility] [address]   [phone]           Please arrive at the time given for your first appointment. This visit is used internally to schedule the physician's time during your ultrasound.              Who to contact     If you have questions or need follow  "up information about today's clinic visit or your schedule please contact [facility] MATERNAL FETAL MEDICINE [facility] directly at [phone].  Normal or non-critical lab and imaging results will be communicated to you by MyChart, letter or phone within 4 business days after the clinic has received the results. If you do not hear from us within 7 days, please contact the clinic through SuitMehart or phone. If you have a critical or abnormal lab result, we will notify you by phone as soon as possible.  Submit refill requests through PoweredAnalytics or call your pharmacy and they will forward the refill request to us. Please allow 3 business days for your refill to be completed.          Additional Information About Your Visit        SuitMeharImpact Radius Information     PoweredAnalytics lets you send messages to your doctor, view your test results, renew your prescriptions, schedule appointments and more. To sign up, go to [email or web address] . Click on \"Log in\" on the left side of the screen, which will take you to the Welcome page. Then click on \"Sign up Now\" on the right side of the page.     You will be asked to enter the access code listed below, as well as some personal information. Please follow the directions to create your username and password.     Your access code is: [email or web address]  Expires: 2018  3:28 PM     Your access code will  in 90 days. If you need help or a new code, please call your [place] clinic or [phone].        Care EveryWhere ID     This is your Care EveryWhere ID. This could be used by other organizations to access your [place] medical records  [id number]        Your Vitals Were     Last Period                   02/10/2017 (Exact Date)            Blood Pressure from Last 3 Encounters:   10/31/17 120/70   10/24/17 120/74   10/17/17 128/70    Weight from Last 3 Encounters:   10/31/17 93.7 kg (206 lb 9.6 oz)   10/24/17 93 kg (205 lb)   10/17/17 91.2 kg (201 lb)              Today, you had the following     " No orders found for display       Primary Care Provider    Physician No Ref-Primary       NO REF-PRIMARY PHYSICIAN        Equal Access to Services     TACOCARROL JENNIFER : Hadii fátima thakur maria fernandamiriam Tanya, wamarada luqadaha, qaybta kajacquelinda juliethyuko, sidra guerra moregloria clancyhilda sandromaximkalin soriano. So [facility] [phone].    ATENCIÓN: Si habla español, tiene a golden disposición servicios gratuitos de asistencia lingüística. Llame al [phone].    We comply with applicable federal civil rights laws and [place] laws. We do not discriminate on the basis of race, color, national origin, age, disability, sex, sexual orientation, or gender identity.            Thank you!     Thank you for choosing [facility]  for your care. Our goal is always to provide you with excellent care. Hearing back from our patients is one way we can continue to improve our services. Please take a few minutes to complete the written survey that you may receive in the mail after your visit with us. Thank you!             Your Updated Medication List - Protect others around you: Learn how to safely use, store and throw away your medicines at www.disposemymeds.org.          This list is accurate as of: 11/3/17  4:36 PM.  Always use your most recent med list.                   Brand Name Dispense Instructions for use Diagnosis    acetone (Urine) test Strp     100 each    Test once daily x1 week, then reduce to once weekly if consistently negative    Gestational diabetes       DACIA CONTOUR NEXT test strip   Generic drug:  blood glucose monitoring     300 strip    USE TO TEST BLOOD SUGAR FOUR TIMES DAILY OR AS DIRECTED    Gestational diabetes       blood glucose monitoring lancets     100 each    Use to test blood sugar 4 times daily or as directed.    Gestational diabetes       doxylamine 25 MG Tabs tablet    UNISOM     Take 25 mg by mouth At Bedtime        insulin isophane human 100 UNIT/ML injection    HumuLIN N PEN    30 mL    Inject  up to 12 units before bed every day.    Insulin controlled gestational diabetes mellitus (GDM) in third trimester       insulin pen needle 32G X 4 MM    INSUPEN PEN NEEDLES    100 each    Use 1 pen needle daily or as directed.    Gestational diabetes       prenatal multivitamin plus iron 27-0.8 MG Tabs per tablet      Take 1 tablet by mouth daily        VITAMIN B6 PO

## 2017-11-03 NOTE — PROGRESS NOTES
"Please see \"Imaging\" tab under \"Chart Review\" for details of today's US at the [facility].    Tony Modi MD  Maternal-Fetal Medicine    "

## 2017-11-07 ENCOUNTER — OFFICE VISIT (OUTPATIENT)
Dept: MATERNAL FETAL MEDICINE | Facility: CLINIC | Age: 27
End: 2017-11-07
Attending: OBSTETRICS & GYNECOLOGY
Payer: COMMERCIAL

## 2017-11-07 ENCOUNTER — HOSPITAL ENCOUNTER (OUTPATIENT)
Dept: ULTRASOUND IMAGING | Facility: CLINIC | Age: 27
Discharge: HOME | End: 2017-11-07
Attending: OBSTETRICS & GYNECOLOGY | Admitting: OBSTETRICS & GYNECOLOGY
Payer: COMMERCIAL

## 2017-11-07 ENCOUNTER — PRENATAL OFFICE VISIT (OUTPATIENT)
Dept: OBGYN | Facility: CLINIC | Age: 27
End: 2017-11-07
Payer: COMMERCIAL

## 2017-11-07 VITALS
BODY MASS INDEX: 36.32 KG/M2 | DIASTOLIC BLOOD PRESSURE: 72 MMHG | SYSTOLIC BLOOD PRESSURE: 126 MMHG | WEIGHT: 205 LBS | HEIGHT: 63 IN | HEART RATE: 100 BPM

## 2017-11-07 DIAGNOSIS — O24.414 INSULIN CONTROLLED GESTATIONAL DIABETES MELLITUS (GDM) IN THIRD TRIMESTER: Primary | ICD-10-CM

## 2017-11-07 DIAGNOSIS — O24.414 INSULIN CONTROLLED GESTATIONAL DIABETES MELLITUS (GDM) IN THIRD TRIMESTER: ICD-10-CM

## 2017-11-07 PROCEDURE — 99207 ZZC PRENATAL VISIT: CPT | Performed by: OBSTETRICS & GYNECOLOGY

## 2017-11-07 PROCEDURE — 76819 FETAL BIOPHYS PROFIL W/O NST: CPT

## 2017-11-07 RX ORDER — BREAST PUMP
1 EACH MISCELLANEOUS DAILY
Qty: 1 EACH | Refills: 0 | Status: SHIPPED | OUTPATIENT
Start: 2017-11-07 | End: 2018-06-05

## 2017-11-07 NOTE — NURSING NOTE
"Chief Complaint   Patient presents with     Prenatal Care       Initial /72 (BP Location: Right arm, Patient Position: Sitting, Cuff Size: Adult Regular)  Pulse 100  Ht 5' 3\" (1.6 m)  Wt 205 lb (93 kg)  LMP 02/10/2017 (Exact Date)  Breastfeeding? No  BMI 36.31 kg/m2 Estimated body mass index is 36.31 kg/(m^2) as calculated from the following:    Height as of this encounter: 5' 3\" (1.6 m).    Weight as of this encounter: 205 lb (93 kg).  Medication Reconciliation: complete     38w3d  + FM daily  + BH contractions  - cramping or bleeding  - leaking of fluid or abnormal discharge  Sharonda Au LPN      "

## 2017-11-07 NOTE — PROGRESS NOTES
PROBLEM LIST  LABS: Apos/RI Gender is a surprise    1. Gestational diabetes, on insulin: twice weekly BPP, growth US in MFM. IOL scheduled for 11/10 PM ripening and 11/11 IOL.    Doing well overall. SHER normal. On Humulin 6u q pm, excellent control. Discussed risks, benefits, and alternatives to induction of labor and reasons this is recommended at 39 weeks. Scheduled on L&D. Signs and symptoms of labor reviewed.      Disha Tsang MD

## 2017-11-07 NOTE — MR AVS SNAPSHOT
After Visit Summary   11/7/2017    Paulo Lema    MRN: [id number]           Patient Information     Date Of Birth          1990        Visit Information        Provider Department      11/7/2017 9:00 AM Disha Tsang MD [facility]        Today's Diagnoses     Insulin controlled gestational diabetes mellitus (GDM) in third trimester    -  1       Follow-ups after your visit        Your next 10 appointments already scheduled     Nov 10, 2017  3:30 PM CST   MFM BPP SINGLE with RHMFMUSR1   [facility] Maternal Fetal Medicine Ultrasound - [facility])    [address]  [facility] [address]   [phone]            Nov 10, 2017  4:00 PM CST   Radiology MD with RH VAUGHN BAUTISTA   [facility] Maternal Fetal Medicine - [facility])    [address]  [facility] [id number] [phone]           Please arrive at the time given for your first appointment. This visit is used internally to schedule the physician's time during your ultrasound.              Who to contact     If you have questions or need follow up information about today's clinic visit or your schedule please contact [facility] directly at [phone].  Normal or non-critical lab and imaging results will be communicated to you by MyChart, letter or phone within 4 business days after the clinic has received the results. If you do not hear from us within 7 days, please contact the clinic through Cellyhart or phone. If you have a critical or abnormal lab result, we will notify you by phone as soon as possible.  Submit refill requests through As It Is or call your pharmacy and they will forward the refill request to us. Please allow 3 business days for your refill to be completed.          Additional Information About Your Visit        [employer] [facility] Information     As It Is lets you send messages to your doctor, view your test results, renew your  "prescriptions, schedule appointments and more. To sign up, go to [email or web address] . Click on \"Log in\" on the left side of the screen, which will take you to the Welcome page. Then click on \"Sign up Now\" on the right side of the page.     You will be asked to enter the access code listed below, as well as some personal information. Please follow the directions to create your username and password.     Your access code is: [email or web address]  Expires: 2018  2:28 PM     Your access code will  in 90 days. If you need help or a new code, please call your [place] clinic or [phone].        Care EveryWhere ID     This is your Care EveryWhere ID. This could be used by other organizations to access your [place] medical records  [id number]        Your Vitals Were     Pulse Height Last Period Breastfeeding? BMI (Body Mass Index)       100 5' 3\" (1.6 m) 02/10/2017 (Exact Date) No 36.31 kg/m2        Blood Pressure from Last 3 Encounters:   17 126/72   10/31/17 120/70   10/24/17 120/74    Weight from Last 3 Encounters:   17 205 lb (93 kg)   10/31/17 206 lb 9.6 oz (93.7 kg)   10/24/17 205 lb (93 kg)              Today, you had the following     No orders found for display         Today's Medication Changes          These changes are accurate as of: 17 10:42 AM.  If you have any questions, ask your nurse or doctor.               Start taking these medicines.        Dose/Directions    breast pump Misc   Started by:  Disha Tsang MD        Dose:  1 each   1 each daily   Quantity:  1 each   Refills:  0            Where to get your medicines      Some of these will need a paper prescription and others can be bought over the counter.  Ask your nurse if you have questions.     Bring a paper prescription for each of these medications     breast pump Misc                Primary Care Provider    Physician No Ref-Primary       NO REF-PRIMARY PHYSICIAN        Equal Access to Services     LINDSAY RODRIGUEZ " AH: Shivamii fátima danielsonrandymiriam Tanya, waaxda luqadaha, qaybta kagiovanny meek, waxeliceo mari moregloria jomaximkalin soriano. So [facility] [phone].    ATENCIÓN: Si habla español, tiene a golden disposición servicios gratuitos de asistencia lingüística. Llame al [phone].    We comply with applicable federal civil rights laws and [place] laws. We do not discriminate on the basis of race, color, national origin, age, disability, sex, sexual orientation, or gender identity.            Thank you!     Thank you for choosing [facility]  for your care. Our goal is always to provide you with excellent care. Hearing back from our patients is one way we can continue to improve our services. Please take a few minutes to complete the written survey that you may receive in the mail after your visit with us. Thank you!             Your Updated Medication List - Protect others around you: Learn how to safely use, store and throw away your medicines at www.disposemymeds.org.          This list is accurate as of: 11/7/17 10:42 AM.  Always use your most recent med list.                   Brand Name Dispense Instructions for use Diagnosis    acetone (Urine) test Strp     100 each    Test once daily x1 week, then reduce to once weekly if consistently negative    Gestational diabetes       DACIA CONTOUR NEXT test strip   Generic drug:  blood glucose monitoring     300 strip    USE TO TEST BLOOD SUGAR FOUR TIMES DAILY OR AS DIRECTED    Gestational diabetes       blood glucose monitoring lancets     100 each    Use to test blood sugar 4 times daily or as directed.    Gestational diabetes       breast pump Misc     1 each    1 each daily        doxylamine 25 MG Tabs tablet    UNISOM     Take 25 mg by mouth At Bedtime        insulin isophane human 100 UNIT/ML injection    HumuLIN N PEN    30 mL    Inject up to 12 units before bed every day.    Insulin controlled gestational diabetes mellitus (GDM) in third trimester        insulin pen needle 32G X 4 MM    INSUPEN PEN NEEDLES    100 each    Use 1 pen needle daily or as directed.    Gestational diabetes       prenatal multivitamin plus iron 27-0.8 MG Tabs per tablet      Take 1 tablet by mouth daily        VITAMIN B6 PO

## 2017-11-07 NOTE — MR AVS SNAPSHOT
After Visit Summary   11/7/2017    Paulo Lema    MRN: [id number]           Patient Information     Date Of Birth          1990        Visit Information        Provider Department      11/7/2017 8:30 AM Luh Gonzalez, DO [facility] Maternal Fetal Medicine [facility]        Today's Diagnoses     Insulin controlled gestational diabetes mellitus (GDM) in third trimester    -  1       Follow-ups after your visit        Your next 10 appointments already scheduled     Nov 10, 2017  3:30 PM CST   VAUGHN BPP SINGLE with RHMFMUSR1   [facility] Maternal Fetal Medicine Ultrasound - [facility])    [address]  [facility] [address]   [phone]            Nov 10, 2017  4:00 PM CST   Radiology MD with  VAUGHN BAUTISTA   [facility] Maternal Fetal Medicine [facility])    [address]  [facility] [id number] [phone]           Please arrive at the time given for your first appointment. This visit is used internally to schedule the physician's time during your ultrasound.              Who to contact     If you have questions or need follow up information about today's clinic visit or your schedule please contact [facility]ET[facility] directly at [phone].  Normal or non-critical lab and imaging results will be communicated to you by [employer] Guard Biotechhart, letter or phone within 4 business days after the clinic has received the results. If you do not hear from us within 7 days, please contact the clinic through [employer] or phone. If you have a critical or abnormal lab result, we will notify you by phone as soon as possible.  Submit refill requests through [employer] or call your pharmacy and they will forward the refill request to us. Please allow 3 business days for your refill to be completed.          Additional Information About Your Visit        [employer] Information     [employer] lets you send messages to your doctor, view  "your test results, renew your prescriptions, schedule appointments and more. To sign up, go to [email or web address] . Click on \"Log in\" on the left side of the screen, which will take you to the Welcome page. Then click on \"Sign up Now\" on the right side of the page.     You will be asked to enter the access code listed below, as well as some personal information. Please follow the directions to create your username and password.     Your access code is: [email or web address]  Expires: 2018  2:28 PM     Your access code will  in 90 days. If you need help or a new code, please call your [place] clinic or [phone].        Care EveryWhere ID     This is your Care EveryWhere ID. This could be used by other organizations to access your [place] medical records  [id number]        Your Vitals Were     Last Period                   02/10/2017 (Exact Date)            Blood Pressure from Last 3 Encounters:   17 126/72   10/31/17 120/70   10/24/17 120/74    Weight from Last 3 Encounters:   17 93 kg (205 lb)   10/31/17 93.7 kg (206 lb 9.6 oz)   10/24/17 93 kg (205 lb)              Today, you had the following     No orders found for display       Primary Care Provider    Physician No Ref-Primary       NO REF-PRIMARY PHYSICIAN        Equal Access to Services     [facility]: Hadii aad ku hadasho Soomaali, waaxda luqadaha, qaybta kaalmada fantasma, sidra gresham . So [facility] [phone].    ATENCIÓN: Si habla español, tiene a golden disposición servicios gratuitos de asistencia lingüística. Kristine al [phone].    We comply with applicable federal civil rights laws and [place] laws. We do not discriminate on the basis of race, color, national origin, age, disability, sex, sexual orientation, or gender identity.            Thank you!     Thank you for choosing [facility]  for your care. Our goal is always to provide you with excellent care. Hearing " back from our patients is one way we can continue to improve our services. Please take a few minutes to complete the written survey that you may receive in the mail after your visit with us. Thank you!             Your Updated Medication List - Protect others around you: Learn how to safely use, store and throw away your medicines at www.disposemymeds.org.          This list is accurate as of: 11/7/17  9:43 AM.  Always use your most recent med list.                   Brand Name Dispense Instructions for use Diagnosis    acetone (Urine) test Strp     100 each    Test once daily x1 week, then reduce to once weekly if consistently negative    Gestational diabetes       DACIA CONTOUR NEXT test strip   Generic drug:  blood glucose monitoring     300 strip    USE TO TEST BLOOD SUGAR FOUR TIMES DAILY OR AS DIRECTED    Gestational diabetes       blood glucose monitoring lancets     100 each    Use to test blood sugar 4 times daily or as directed.    Gestational diabetes       doxylamine 25 MG Tabs tablet    UNISOM     Take 25 mg by mouth At Bedtime        insulin isophane human 100 UNIT/ML injection    HumuLIN N PEN    30 mL    Inject up to 12 units before bed every day.    Insulin controlled gestational diabetes mellitus (GDM) in third trimester       insulin pen needle 32G X 4 MM    INSUPEN PEN NEEDLES    100 each    Use 1 pen needle daily or as directed.    Gestational diabetes       prenatal multivitamin plus iron 27-0.8 MG Tabs per tablet      Take 1 tablet by mouth daily        VITAMIN B6 PO

## 2017-11-10 ENCOUNTER — HOSPITAL ENCOUNTER (OUTPATIENT)
Dept: ULTRASOUND IMAGING | Facility: CLINIC | Age: 27
Discharge: HOME | End: 2017-11-10
Attending: OBSTETRICS & GYNECOLOGY | Admitting: OBSTETRICS & GYNECOLOGY
Payer: COMMERCIAL

## 2017-11-10 ENCOUNTER — OFFICE VISIT (OUTPATIENT)
Dept: MATERNAL FETAL MEDICINE | Facility: CLINIC | Age: 27
End: 2017-11-10
Attending: OBSTETRICS & GYNECOLOGY
Payer: COMMERCIAL

## 2017-11-10 DIAGNOSIS — O24.414 INSULIN CONTROLLED GESTATIONAL DIABETES MELLITUS (GDM) IN THIRD TRIMESTER: ICD-10-CM

## 2017-11-10 DIAGNOSIS — O24.414 INSULIN CONTROLLED GESTATIONAL DIABETES MELLITUS (GDM) IN THIRD TRIMESTER: Primary | ICD-10-CM

## 2017-11-10 PROCEDURE — 76819 FETAL BIOPHYS PROFIL W/O NST: CPT

## 2017-11-10 NOTE — PROGRESS NOTES
"Please see \"Imaging\" tab under \"Chart Review\" for details of today's US at the [facility].    Tony Modi MD  Maternal-Fetal Medicine    "

## 2017-11-10 NOTE — MR AVS SNAPSHOT
After Visit Summary   11/10/2017    Paulo Lema    MRN: [id number]           Patient Information     Date Of Birth          1990        Visit Information        Provider Department      11/10/2017 4:00 PM Tony Modi MD [facility] Maternal Fetal Medicine [facility]        Today's Diagnoses     Insulin controlled gestational diabetes mellitus (GDM) in third trimester    -  1       Follow-ups after your visit        Your next 10 appointments already scheduled     Nov 10, 2017  3:30 PM CST   MFM BPP SINGLE with RHMFMUSR1   [facility] Maternal Fetal Medicine Ultrasound - [facility])    [address]  [facility] [id number] [phone]            Nov 10, 2017  4:00 PM CST   Radiology MD with Tony Modi MD   [facility] Maternal Fetal Medicine [facility])    [address]  [facility] [id number] [phone]           Please arrive at the time given for your first appointment. This visit is used internally to schedule the physician's time during your ultrasound.              Who to contact     If you have questions or need follow up information about today's clinic visit or your schedule please contact [facility]ET[facility] directly at [phone].  Normal or non-critical lab and imaging results will be communicated to you by Everyclickhart, letter or phone within 4 business days after the clinic has received the results. If you do not hear from us within 7 days, please contact the clinic through Everyclickhart or phone. If you have a critical or abnormal lab result, we will notify you by phone as soon as possible.  Submit refill requests through [employer] or call your pharmacy and they will forward the refill request to us. Please allow 3 business days for your refill to be completed.          Additional Information About Your Visit        [employer] Information     [employer] lets you send messages to your  "doctor, view your test results, renew your prescriptions, schedule appointments and more. To sign up, go to [email or web address] . Click on \"Log in\" on the left side of the screen, which will take you to the Welcome page. Then click on \"Sign up Now\" on the right side of the page.     You will be asked to enter the access code listed below, as well as some personal information. Please follow the directions to create your username and password.     Your access code is: [email or web address]  Expires: 2018  2:28 PM     Your access code will  in 90 days. If you need help or a new code, please call your [place] clinic or [phone].        Care EveryWhere ID     This is your Care EveryWhere ID. This could be used by other organizations to access your [place] medical records  [id number]        Your Vitals Were     Last Period                   02/10/2017 (Exact Date)            Blood Pressure from Last 3 Encounters:   17 126/72   10/31/17 120/70   10/24/17 120/74    Weight from Last 3 Encounters:   17 93 kg (205 lb)   10/31/17 93.7 kg (206 lb 9.6 oz)   10/24/17 93 kg (205 lb)              Today, you had the following     No orders found for display       Primary Care Provider    Physician No Ref-Primary       NO REF-PRIMARY PHYSICIAN        Equal Access to Services     [facility]: Hadii aad ku hadasho Soelpidio, waaxda luqadaha, qaybta kaalmada fantasma, sidra gresham . So [facility] [phone].    ATENCIÓN: Si habla español, tiene a golden disposición servicios gratuitos de asistencia lingüística. Cindyame al [phone].    We comply with applicable federal civil rights laws and [place] laws. We do not discriminate on the basis of race, color, national origin, age, disability, sex, sexual orientation, or gender identity.            Thank you!     Thank you for choosing [facility]  for your care. Our goal is always to provide you with excellent " care. Hearing back from our patients is one way we can continue to improve our services. Please take a few minutes to complete the written survey that you may receive in the mail after your visit with us. Thank you!             Your Updated Medication List - Protect others around you: Learn how to safely use, store and throw away your medicines at www.disposemymeds.org.          This list is accurate as of: 11/10/17  3:20 PM.  Always use your most recent med list.                   Brand Name Dispense Instructions for use Diagnosis    acetone (Urine) test Strp     100 each    Test once daily x1 week, then reduce to once weekly if consistently negative    Gestational diabetes       DACIA CONTOUR NEXT test strip   Generic drug:  blood glucose monitoring     300 strip    USE TO TEST BLOOD SUGAR FOUR TIMES DAILY OR AS DIRECTED    Gestational diabetes       blood glucose monitoring lancets     100 each    Use to test blood sugar 4 times daily or as directed.    Gestational diabetes       breast pump Misc     1 each    1 each daily        doxylamine 25 MG Tabs tablet    UNISOM     Take 25 mg by mouth At Bedtime        insulin isophane human 100 UNIT/ML injection    HumuLIN N PEN    30 mL    Inject up to 12 units before bed every day.    Insulin controlled gestational diabetes mellitus (GDM) in third trimester       insulin pen needle 32G X 4 MM    INSUPEN PEN NEEDLES    100 each    Use 1 pen needle daily or as directed.    Gestational diabetes       prenatal multivitamin plus iron 27-0.8 MG Tabs per tablet      Take 1 tablet by mouth daily        VITAMIN B6 PO

## 2017-11-11 ENCOUNTER — HOSPITAL ENCOUNTER (INPATIENT)
Facility: CLINIC | Age: 27
LOS: 4 days | Discharge: HOME | End: 2017-11-15
Attending: OBSTETRICS & GYNECOLOGY | Admitting: OBSTETRICS & GYNECOLOGY
Payer: COMMERCIAL

## 2017-11-11 DIAGNOSIS — Z98.891 S/P PRIMARY LOW TRANSVERSE C-SECTION: Primary | ICD-10-CM

## 2017-11-11 LAB
GLUCOSE BLDC GLUCOMTR-MCNC: 85 MG/DL (ref 70–99)
GLUCOSE BLDC GLUCOMTR-MCNC: 87 MG/DL (ref 70–99)
GLUCOSE BLDC GLUCOMTR-MCNC: 97 MG/DL (ref 70–99)
GLUCOSE SERPL-MCNC: 84 MG/DL (ref 70–99)
T PALLIDUM IGG+IGM SER QL: NEGATIVE

## 2017-11-11 PROCEDURE — 82947 ASSAY GLUCOSE BLOOD QUANT: CPT | Performed by: OBSTETRICS & GYNECOLOGY

## 2017-11-11 PROCEDURE — 25000132 ZZH RX MED GY IP 250 OP 250 PS 637: Performed by: OBSTETRICS & GYNECOLOGY

## 2017-11-11 PROCEDURE — 86900 BLOOD TYPING SEROLOGIC ABO: CPT | Performed by: OBSTETRICS & GYNECOLOGY

## 2017-11-11 PROCEDURE — 25000128 H RX IP 250 OP 636: Performed by: OBSTETRICS & GYNECOLOGY

## 2017-11-11 PROCEDURE — 85018 HEMOGLOBIN: CPT | Performed by: OBSTETRICS & GYNECOLOGY

## 2017-11-11 PROCEDURE — 86901 BLOOD TYPING SEROLOGIC RH(D): CPT | Performed by: OBSTETRICS & GYNECOLOGY

## 2017-11-11 PROCEDURE — 86780 TREPONEMA PALLIDUM: CPT | Performed by: OBSTETRICS & GYNECOLOGY

## 2017-11-11 PROCEDURE — 12000029 ZZH R&B OB INTERMEDIATE

## 2017-11-11 PROCEDURE — 00000146 ZZHCL STATISTIC GLUCOSE BY METER IP

## 2017-11-11 PROCEDURE — 86850 RBC ANTIBODY SCREEN: CPT | Performed by: OBSTETRICS & GYNECOLOGY

## 2017-11-11 RX ORDER — CARBOPROST TROMETHAMINE 250 UG/ML
250 INJECTION, SOLUTION INTRAMUSCULAR
Status: DISCONTINUED | OUTPATIENT
Start: 2017-11-11 | End: 2017-11-12

## 2017-11-11 RX ORDER — FENTANYL CITRATE 50 UG/ML
50-100 INJECTION, SOLUTION INTRAMUSCULAR; INTRAVENOUS
Status: DISCONTINUED | OUTPATIENT
Start: 2017-11-11 | End: 2017-11-12

## 2017-11-11 RX ORDER — NICOTINE POLACRILEX 4 MG
15-30 LOZENGE BUCCAL
Status: DISCONTINUED | OUTPATIENT
Start: 2017-11-11 | End: 2017-11-12

## 2017-11-11 RX ORDER — ONDANSETRON 2 MG/ML
4 INJECTION INTRAMUSCULAR; INTRAVENOUS EVERY 6 HOURS PRN
Status: DISCONTINUED | OUTPATIENT
Start: 2017-11-11 | End: 2017-11-12

## 2017-11-11 RX ORDER — OXYCODONE AND ACETAMINOPHEN 5; 325 MG/1; MG/1
1 TABLET ORAL
Status: DISCONTINUED | OUTPATIENT
Start: 2017-11-11 | End: 2017-11-12

## 2017-11-11 RX ORDER — NALBUPHINE HYDROCHLORIDE 10 MG/ML
10-20 INJECTION, SOLUTION INTRAMUSCULAR; INTRAVENOUS; SUBCUTANEOUS
Status: DISCONTINUED | OUTPATIENT
Start: 2017-11-11 | End: 2017-11-12

## 2017-11-11 RX ORDER — IBUPROFEN 800 MG/1
800 TABLET, FILM COATED ORAL
Status: DISCONTINUED | OUTPATIENT
Start: 2017-11-11 | End: 2017-11-12

## 2017-11-11 RX ORDER — DEXTROSE MONOHYDRATE 25 G/50ML
25-50 INJECTION, SOLUTION INTRAVENOUS
Status: DISCONTINUED | OUTPATIENT
Start: 2017-11-11 | End: 2017-11-12

## 2017-11-11 RX ORDER — SODIUM CHLORIDE 9 MG/ML
INJECTION, SOLUTION INTRAVENOUS CONTINUOUS
Status: DISCONTINUED | OUTPATIENT
Start: 2017-11-11 | End: 2017-11-12

## 2017-11-11 RX ORDER — OXYTOCIN/0.9 % SODIUM CHLORIDE 30/500 ML
100-340 PLASTIC BAG, INJECTION (ML) INTRAVENOUS CONTINUOUS PRN
Status: DISCONTINUED | OUTPATIENT
Start: 2017-11-11 | End: 2017-11-12

## 2017-11-11 RX ORDER — SODIUM CHLORIDE, SODIUM LACTATE, POTASSIUM CHLORIDE, CALCIUM CHLORIDE 600; 310; 30; 20 MG/100ML; MG/100ML; MG/100ML; MG/100ML
INJECTION, SOLUTION INTRAVENOUS CONTINUOUS
Status: DISCONTINUED | OUTPATIENT
Start: 2017-11-11 | End: 2017-11-12

## 2017-11-11 RX ORDER — NALOXONE HYDROCHLORIDE 0.4 MG/ML
.1-.4 INJECTION, SOLUTION INTRAMUSCULAR; INTRAVENOUS; SUBCUTANEOUS
Status: DISCONTINUED | OUTPATIENT
Start: 2017-11-11 | End: 2017-11-12

## 2017-11-11 RX ORDER — CALCIUM CARBONATE 500 MG/1
500-1000 TABLET, CHEWABLE ORAL DAILY PRN
Status: DISCONTINUED | OUTPATIENT
Start: 2017-11-11 | End: 2017-11-12

## 2017-11-11 RX ORDER — ZOLPIDEM TARTRATE 5 MG/1
5 TABLET ORAL ONCE
Status: COMPLETED | OUTPATIENT
Start: 2017-11-11 | End: 2017-11-11

## 2017-11-11 RX ORDER — ACETAMINOPHEN 325 MG/1
650 TABLET ORAL EVERY 4 HOURS PRN
Status: DISCONTINUED | OUTPATIENT
Start: 2017-11-11 | End: 2017-11-12

## 2017-11-11 RX ORDER — METHYLERGONOVINE MALEATE 0.2 MG/ML
200 INJECTION INTRAVENOUS
Status: DISCONTINUED | OUTPATIENT
Start: 2017-11-11 | End: 2017-11-12

## 2017-11-11 RX ORDER — OXYTOCIN 10 [USP'U]/ML
10 INJECTION, SOLUTION INTRAMUSCULAR; INTRAVENOUS
Status: DISCONTINUED | OUTPATIENT
Start: 2017-11-11 | End: 2017-11-12

## 2017-11-11 RX ORDER — DEXTROSE, SODIUM CHLORIDE, SODIUM LACTATE, POTASSIUM CHLORIDE, AND CALCIUM CHLORIDE 5; .6; .31; .03; .02 G/100ML; G/100ML; G/100ML; G/100ML; G/100ML
INJECTION, SOLUTION INTRAVENOUS CONTINUOUS
Status: DISCONTINUED | OUTPATIENT
Start: 2017-11-11 | End: 2017-11-12

## 2017-11-11 RX ADMIN — CALCIUM CARBONATE (ANTACID) CHEW TAB 500 MG 1000 MG: 500 CHEW TAB at 01:31

## 2017-11-11 RX ADMIN — DINOPROSTONE 10 MG: 10 INSERT VAGINAL at 16:58

## 2017-11-11 RX ADMIN — DINOPROSTONE 10 MG: 10 INSERT VAGINAL at 02:10

## 2017-11-11 RX ADMIN — ZOLPIDEM TARTRATE 5 MG: 5 TABLET, FILM COATED ORAL at 03:01

## 2017-11-11 RX ADMIN — SODIUM CHLORIDE, POTASSIUM CHLORIDE, SODIUM LACTATE AND CALCIUM CHLORIDE 500 ML: 600; 310; 30; 20 INJECTION, SOLUTION INTRAVENOUS at 21:21

## 2017-11-11 NOTE — PLAN OF CARE
Patient arrived with  Brennan for induction of labor due to gestational diabetes.  Patient states she has been feeling occasional mild contractions, but not painful or regular contractions.  Denies vaginal bleeding or leaking of fluid, but has had some scant discharge.  States she is feeling baby move.  External monitors applied.

## 2017-11-11 NOTE — PROGRESS NOTES
[facility] Labor and Delivery History and Physical    Paulo Lema MRN# [id number]   Age: 27 year old YOB: 1990     Date of Admission:  2017    Primary care provider: Disha Tsang MD  Admitting provider: Kaykay Eastman DO           Chief Complaint:   Paulo Lema is a 27 year old female who is 39w0d pregnant and being admitted for induction of labor, indication Gestational Diabetes - Insulin Controlled.          Pregnancy history:     OBSTETRIC HISTORY:    Obstetric History       T0      L0     SAB0   TAB0   Ectopic0   Multiple0   Live Births0       # Outcome Date GA Lbr González/2nd Weight Sex Delivery Anes PTL Lv   1 Current                   EDC: Estimated Date of Delivery: 17    Prenatal Labs: Lab Results   Component Value Date    ABO A 2017    RH Pos 2017    AS Neg 2017    HEPBANG Nonreactive 2017    CHPCRT  2017     Negative   Negative for C. trachomatis rRNA by transcription mediated amplification.   A negative result by transcription mediated amplification does not preclude the   presence of C. trachomatis infection because results are dependent on proper   and adequate collection, absence of inhibitors, and sufficient rRNA to be   detected.      GCPCRT  2017     Negative   Negative for N. gonorrhoeae rRNA by transcription mediated amplification.   A negative result by transcription mediated amplification does not preclude the   presence of N. gonorrhoeae infection because results are dependent on proper   and adequate collection, absence of inhibitors, and sufficient rRNA to be   detected.      TREPAB Negative 2017    HGB 10.0 (L) 08/10/2017       GBS Status:   Lab Results   Component Value Date    GBS Negative 10/24/2017       Active Problem List  Patient Active Problem List   Diagnosis     High risk pregnancy in young primigravida     Indication for care in labor or delivery       Medication Prior to  Admission  Prescriptions Prior to Admission   Medication Sig Dispense Refill Last Dose     Prenatal Vit-Fe Fumarate-FA (PRENATAL MULTIVITAMIN  PLUS IRON) 27-0.8 MG TABS per tablet Take 1 tablet by mouth daily   11/10/2017 at Unknown time     [facility] Devices (BREAST PUMP) [facility] 1 each daily 1 each 0      insulin isophane human (HUMULIN N PEN) 100 UNIT/ML injection Inject up to 12 units before bed every day. 30 mL 1 Taking     doxylamine (UNISOM) 25 MG TABS tablet Take 25 mg by mouth At Bedtime   Taking     insulin pen needle (INSUPEN PEN NEEDLES) 32G X 4 MM Use 1 pen needle daily or as directed. 100 each 1 Taking     blood glucose monitoring (DACIA MICROLET) lancets Use to test blood sugar 4 times daily or as directed. 100 each 1 Taking     acetone, Urine, test STRP Test once daily x1 week, then reduce to once weekly if consistently negative 100 each 3 Taking     DACIA CONTOUR NEXT test strip USE TO TEST BLOOD SUGAR FOUR TIMES DAILY OR AS DIRECTED 300 strip 3 Taking   .        Maternal Past Medical History:     Past Medical History:   Diagnosis Date     Diabetes (H)     w/ first pregnancy-on insulin                       Family History:     Family History   Problem Relation Age of Onset     Family History Negative Mother                Social History:     Social History   Substance Use Topics     Smoking status: Never Smoker     Smokeless tobacco: Never Used     Alcohol use No            Review of Systems:   The Review of Systems is negative other than noted in the HPI          Physical Exam:   Patient Vitals for the past 8 hrs:   BP Temp Temp src Resp   17 0728 136/75 97.5  F (36.4  C) Oral 16   17 0400 116/57 - - 16   17 0300 123/64 97.6  F (36.4  C) Oral 16       Cervix:   Membranes: intact   Dilation: 1   Effacement: 50%   Station:-3      Fetal Heart Rate Tracin, moderate variability, + accels  Tocometer: frequency q 2-8 minutes                      Assessment:   Paulo Lema is a 39w0d  pregnant female admitted with induction of labor, indication IDGD.  Anemia of pregnancy (Hgb 10).          Plan:   Admit - see IP orders  cervical ripening with cervidil    Kaykay Eastman DO

## 2017-11-11 NOTE — PROVIDER NOTIFICATION
11/11/17 1138   Provider Notification   Provider Name/Title Dr dobbs   Method of Notification In Department   Request Evaluate - Remote     MD rodriguez with pt ordering lunch. Orders received for fasting AM glucose and 1 hour post prandial glucose checks.

## 2017-11-11 NOTE — IP AVS SNAPSHOT
[facility]    [address]    [facility] [address]    Phone:  [phone]    Fax:  [phone]                                       After Visit Summary   11/11/2017    Paulo Lema    MRN: [id number]           After Visit Summary Signature Page     I have received my discharge instructions, and my questions have been answered. I have discussed any challenges I see with this plan with the nurse or doctor.    ..........................................................................................................................................  Patient/Patient Representative Signature      ..........................................................................................................................................  Patient Representative Print Name and Relationship to Patient    ..................................................               ................................................  Date                                            Time    ..........................................................................................................................................  Reviewed by Signature/Title    ...................................................              ..............................................  Date                                                            Time

## 2017-11-11 NOTE — PROVIDER NOTIFICATION
11/11/17 1424   Provider Notification   Provider Name/Title Dr dobbs   Method of Notification Phone   Request Evaluate - Remote   Notification Reason Uterine Activity;SVE;Status Update     MD updated on SVE after cervidil removal. Update on contraction pattern. Because pt is christopher and feeling contractions MD would like to wait for two hours, recheck SVE and if unchanged place another cervidil. Ok for pt to shower now.

## 2017-11-11 NOTE — IP AVS SNAPSHOT
MRN:[id number]                      After Visit Summary   2017    Paulo Lema    MRN: [id number]           Thank you!     Thank you for choosing [facility] for your care. Our goal is always to provide you with excellent care. Hearing back from our patients is one way we can continue to improve our services. Please take a few minutes to complete the written survey that you may receive in the mail after you visit. If you would like to speak to someone directly about your visit please contact Patient Relations at [phone]. Thank you!          Patient Information     Date Of Birth          1990        Designated Caregiver       Most Recent Value    Caregiver    Will someone help with your care after discharge? no      About your hospital stay     You were admitted on:  2017 You last received care in the:  [facility] Postpartum    You were discharged on:  November 15, 2017       Who to Call     For medical emergencies, please call 911.  For non-urgent questions about your medical care, please call your primary care provider or clinic, None  For questions related to your surgery, please call your surgery clinic        Attending Provider     Provider Kaykay Miller DO OB/Gyn    Blanca Villarreal MD OB/Gyn       Primary Care Provider    Physician No Ref-Primary      Further instructions from your care team       Homecare  [phone]    Lactation [phone]    Post Partum Discharge Instructions after a  Section    What to expect:   No matter how well you feel when you get home, these first few days at home are for rest and recuperation. You can be surprised by how much rest it takes to fully recover from the delivery and how little extra time there is for tasks other than eating, sleeping and caring for your . Limit your activities to caring for yourself and your baby; eat, rest and relax. Let others clean, cook  and supervise household needs (including older siblings). You will tire more quickly and this can be emotionally draining in a new situation without giving yourself a chance to recharge regularly.      Physical Changes:    Breast milk comes in within three to six days after delivery. When breast milk comes in, it is not unusual to get a low grade fever (max 101 F) and very hard, sensitive breasts with or without leaking.    Spotting or bleeding, like a period, on and off for six to eight weeks after delivery. Pattern of bleeding varies during the recovery period and will continue to vary with or without breastfeeding. Passing small to medium clots on occasion is also a normal experience.    Menstrual cramping may be more noticeable during and immediately following breastfeeding.    Legs and feet often swell more after delivery than before delivery; this usually returns to normal within two weeks postpartum.    Constipation is a common complaint - this can lead to significant abdominal pain and cramping. Drink plenty of fluids, eat fresh fruit and vegetables, as well as plenty of fiber.    Breast care:  Breastfeeding:    Breastfeeding can be difficult; learning how to breastfeed takes time and patience.     Your milk should come in 3-6 days after delivery. If you are not drinking enough fluids or had an unusual amount of blood loss at delivery, it may take a few days longer.    During the first few days the baby is getting colostrum- this is high in antibodies which are good for the baby.    After nursing, remember to express a little breast milk and rub it on your nipples and allow them to air dry for 10-15 minutes after each feeding. You may also use pure lanolin or vitamin E oil or cream if you prefer.     If your breasts become engorged, try warm compresses or a warm shower, followed by feeding the baby or expressing milk to relieve the discomfort.    Bottle feeding:    Wear a tight fitting bra or bind your  breasts with an ACE bandage. Ice packs will help relieve the soreness. Do not try to express the milk as this will only stimulate the production of more milk.    Emotions:    There are a wide range of emotions you may experience once you have gone home.    Many things will contribute to unusual feelings during the postpartum period:   -Feelings of anxiety about caring for your new baby   -Hormone changes   -Lack of sleep    Feelings of being overwhelmed, exhausted and out of your element are very normal. Be patient with yourself, this is a normal process of adjustment and you will get it with time.    You may be surprised at how fragile, alone, and overwhelmed you feel. About 70% of women have baby blues after childbirth. If you feel that your sadness is progressing to extreme sadness to a point of being unable to care for yourself or your baby, you may have postpartum depression. Counseling with a qualified therapist can be very helpful. If you do not know who to call or where to turn, call your physician.    Postpartum pain control:  It is normal to have abdominal pain following surgery. The goal is to get your pain level to a 3/10, enabling you to walk around comfortably. You may experience cramping for up to 1-2 weeks, particularly while breast feeding.     -Start your day by taking up to 2-4 tabs of regular strength ibuprofen (400-800mg), continue every 6 hours as needed for pain.    -You can additionally take 1-2 tabs of tylenol (325-650mg regular strength or 500-1000mg extra strength), every 6 hours for additional pain control as needed. Take no more than 4,000 mg of tylenol in 24 hours.     - Narcotic pain medication is available for breakthrough pain, you may find that you need it every 3-6 hours or just at night. You can take 1-2 tabs of oxycodone, roxicodone, or norco as needed. If you are taking a formulation that contains tylenol (oxycodone, norco) do NOT take additional tylenol.     It is best to  alternate your medications so you are taking something every 3 hours if you are having continued pain.    For example:  6am: ibuprofen 3 tabs (800 mg)   9am: tylenol 1000 mg  12pm: ibuprofen 800 mg  3pm: tylenol 1000 mg  6pm: ibuprofen 800 mg  9pm: tylenol 1000 mg  12am: ibuprofen 800 mg    Remember, you can use the oxycodone or other narcotic pain medication in between these times if needed for significant breakthrough pain that makes it hard to walk or function. This may be necessary for the first 3-7 days, especially.    If you have vaginal pain you can take sitz baths 1-2x/day. Fill the tub with 2-3 inches of warm water and soak the perineal and vaginal area for 10 minutes. This can be done 3-4 times a day. Ice or warm packs can also be applied for comfort.      Activity:    Rest as much as possible, limit visitors and take frequent naps at first    Do not lift anything heavier than your baby plus carrier for 3-4 weeks after a vaginal delivery. No lifting > 20lb for 6 weeks after a  delivery.     Showers are fine, no baths for two weeks.    If your bleeding increases or is bright red, you are doing too much.       Walk and climb stairs as tolerated     Avoid exercising for at least three weeks after a vaginal delivery and six weeks after a . Progress activity slowly.    Driving is not recommended for the first two weeks after your delivery; more restriction may apply. You should never drive if you are taking prescription pain medication (oxycodone, norco).    Avoid intercourse for four to six weeks after delivery. Beware that severe vaginal dryness may cause significant discomfort with intercourse. This is normal, but can be unpleasant. Using Jelly/Astroglide/Replens or similar may be needed. Remember to use reliable contraception as pregnancy is possible (even when breastfeeding)!    Diet:    Drink at least eight glasses of water each day, especially if you are breastfeeding.    If  breastfeeding, you will need 330kcal/day more than non-lactating women.     If not breastfeeding, you may resume your pre-pregnancy diet.    Continue taking in good calcium - at least four servings of calcium fortified foods per day.     Constipation:  You may use the following products to ease constipation:    1. Stool softeners such as metamucil or benefiber (over the counter)  2. Senna 1-2 times daily  3. Fiber supplements (over the counter)  4. Miralax (over the counter)  5. Colace  6. Dulcolax      Please be sure to keep adequately hydrated: 6-8 8oz glasses daily, more if needed to compensate for exercise, sweating, etc.      More specific dosing for constipation medications can be found below:    - Metamucil 28g daily PO with 8oz of water  - Senna-docusate 8.6-50 MG per tablet PO 1 tablet, Oral, 2 TIMES DAILY, Start with 1 tablet PO BID, reduce to 1 tablet daily when having daily BMs. Stop for loose stools.  - Docusate sodium (COLACE) capsule 100 mg, Oral, 2 TIMES DAILY. Stop for loose stools.  - Bisacodyl (DULCOLAX) suppository 10 mg, Rectal, DAILY PRN. Stop for loose stools.    Other Medications:  Keep taking your prenatal vitamin until you stop breastfeeding; if not breastfeeding, may stop one month after the delivery.  If you were on an iron supplement at the end of your pregnancy or after delivery, continue this for twelve weeks after delivery (or until prescription gone).    Staples:  Some patients have non-dissolving staples placed to close their incision. If you are discharged home with non-dissolving staples in place, please call your obstetrician's office to have them removed 2-3 days after discharge.    Stitches:    Your stitches will dissolve by themselves - both  and episiotomy/vaginal stitches.      Use the pericare bottle given to you in the hospital to rinse off your bottom after using the bathroom and gently pat dry with tissue.    For  incisions, keep the site clean and dry.  If there are steri-strips on your incision, you may remove them one week after your surgery     You may shower as often as you desire after delivery - either a vaginal or a  delivery.    Keep the incision open to air; no bandage needs to be kept on the incision.    If there is a small amount of liquid drainage - clear, red, white - this is normal and may be seen now and then. If there is a large amount of  drainage, increasing pain or redness of the incision and/or fever, call the office to be seen.      WHEN TO CALL-      Fever: 100.4 degrees or higher      Nausea and vomiting       Frequent and painful urination       Bleeding heavier than 1 pad per hour                                Open area of  incision                         Increasing pelvic/abdominal pain not controlled with your medications      Red, tender, painful area on the breast       Persistent perineal pain with increasing intensity                         Foul smelling discharge (increased volume of discharge is normal)      Pain, swelling, and tenderness in leg       Chest pain and cough       Severe headache, especially with visual changes    Please contact the clinic with any questions.  Please schedule a follow up appointment with your primary OB/Gyn provider in 2 weeks or sooner if you have any problems.   You can contact the clinic via [employer] or call [place] at [phone].    Schedule your well baby visit as directed by the pediatrician or family practice physician.        Pending Results     No orders found from 2017 to 2017.            Statement of Approval     Ordered          11/15/17 0940  I have reviewed and agree with all the recommendations and orders detailed in this document.  EFFECTIVE NOW     Approved and electronically signed by:  Kaykay Eastman DO             Admission Information     Date & Time Provider Department Dept. Phone    2017 Blanca Villarreal MD [facility]  "Postpartum [phone]      Your Vitals Were     Blood Pressure Pulse Temperature Respirations Height Weight    135/78 78 98.1  F (36.7  C) (Oral) 16 1.6 m (5' 2.99\") 91.6 kg (202 lb)    Last Period Pulse Oximetry BMI (Body Mass Index)             02/10/2017 (Exact Date) 97% 35.79 kg/m2         [employer] Information     [employer] lets you send messages to your doctor, view your test results, renew your prescriptions, schedule appointments and more. To sign up, go to [email or web address] . Click on \"Log in\" on the left side of the screen, which will take you to the Welcome page. Then click on \"Sign up Now\" on the right side of the page.     You will be asked to enter the access code listed below, as well as some personal information. Please follow the directions to create your username and password.     Your access code is: [email or web address]  Expires: 2018  2:28 PM     Your access code will  in 90 days. If you need help or a new code, please call your [place] clinic or [phone].        Care EveryWhere ID     This is your Care EveryWhere ID. This could be used by other organizations to access your [place] medical records  [id number]        Equal Access to Services     LINDSAY RODRIGUEZ AH: Hadruben parra Soelpidio, waaxda luqadaha, qaybta kaalmada adeegyada, sidra gresham . So [facility] [phone].    ATENCIÓN: Si habla español, tiene a golden disposición servicios gratuitos de asistencia lingüística. Llame al [phone].    We comply with applicable federal civil rights laws and [place] laws. We do not discriminate on the basis of race, color, national origin, age, disability, sex, sexual orientation, or gender identity.               Review of your medicines      START taking        Dose / Directions    ibuprofen 600 MG tablet   Commonly known as:  ADVIL/MOTRIN        Dose:  600 mg   Take 1 tablet (600 mg) by mouth every 6 hours as needed for moderate pain   Quantity:  100 tablet "   Refills:  3       oxyCODONE IR 5 MG tablet   Commonly known as:  ROXICODONE        Dose:  5-10 mg   Take 1-2 tablets (5-10 mg) by mouth every 4 hours as needed for pain maximum 8 tablet(s) per day   Quantity:  30 tablet   Refills:  0         CONTINUE these medicines which have NOT CHANGED        Dose / Directions    acetone (Urine) test Strp   Used for:  Gestational diabetes        Test once daily x1 week, then reduce to once weekly if consistently negative   Quantity:  100 each   Refills:  3       DACIA CONTOUR NEXT test strip   Used for:  Gestational diabetes   Generic drug:  blood glucose monitoring        USE TO TEST BLOOD SUGAR FOUR TIMES DAILY OR AS DIRECTED   Quantity:  300 strip   Refills:  3       blood glucose monitoring lancets   Used for:  Gestational diabetes        Use to test blood sugar 4 times daily or as directed.   Quantity:  100 each   Refills:  1       breast pump Misc        Dose:  1 each   1 each daily   Quantity:  1 each   Refills:  0       doxylamine 25 MG Tabs tablet   Commonly known as:  UNISOM        Dose:  25 mg   Take 25 mg by mouth At Bedtime   Refills:  0       insulin isophane human 100 UNIT/ML injection   Commonly known as:  HumuLIN N PEN   Used for:  Insulin controlled gestational diabetes mellitus (GDM) in third trimester        Inject up to 12 units before bed every day.   Quantity:  30 mL   Refills:  1       insulin pen needle 32G X 4 MM   Commonly known as:  INSUPEN PEN NEEDLES   Used for:  Gestational diabetes        Use 1 pen needle daily or as directed.   Quantity:  100 each   Refills:  1       prenatal multivitamin plus iron 27-0.8 MG Tabs per tablet        Dose:  1 tablet   Take 1 tablet by mouth daily   Refills:  0            Where to get your medicines      These medications were sent to [facility] [address] [address] [address]     Phone:  [phone]     ibuprofen 600 MG tablet         Some of  these will need a paper prescription and others can be bought over the counter. Ask your nurse if you have questions.     Bring a paper prescription for each of these medications     oxyCODONE IR 5 MG tablet                Protect others around you: Learn how to safely use, store and throw away your medicines at www.disposemymeds.org.             Medication List: This is a list of all your medications and when to take them. Check marks below indicate your daily home schedule. Keep this list as a reference.      Medications           Morning Afternoon Evening Bedtime As Needed    acetone (Urine) test Strp   Test once daily x1 week, then reduce to once weekly if consistently negative                                DACIA CONTOUR NEXT test strip   USE TO TEST BLOOD SUGAR FOUR TIMES DAILY OR AS DIRECTED   Generic drug:  blood glucose monitoring                                blood glucose monitoring lancets   Use to test blood sugar 4 times daily or as directed.                                breast pump Misc   1 each daily                                doxylamine 25 MG Tabs tablet   Commonly known as:  UNISOM   Take 25 mg by mouth At Bedtime                                ibuprofen 600 MG tablet   Commonly known as:  ADVIL/MOTRIN   Take 1 tablet (600 mg) by mouth every 6 hours as needed for moderate pain   Last time this was given:  800 mg on 11/15/2017 10:06 AM                                insulin isophane human 100 UNIT/ML injection   Commonly known as:  HumuLIN N PEN   Inject up to 12 units before bed every day.                                insulin pen needle 32G X 4 MM   Commonly known as:  INSUPEN PEN NEEDLES   Use 1 pen needle daily or as directed.                                oxyCODONE IR 5 MG tablet   Commonly known as:  ROXICODONE   Take 1-2 tablets (5-10 mg) by mouth every 4 hours as needed for pain maximum 8 tablet(s) per day   Last time this was given:  5 mg on 11/15/2017 10:06 AM                                 prenatal multivitamin plus iron 27-0.8 MG Tabs per tablet   Take 1 tablet by mouth daily   Last time this was given:  1 tablet on 11/15/2017  8:08 AM

## 2017-11-11 NOTE — PROVIDER NOTIFICATION
11/11/17 0105   Provider Notification   Provider Name/Title Dr. Gautam   Method of Notification Phone   Request Evaluate - Remote   Notification Reason Patient Arrived;SVE   Orders received for cervical ripening with cervidil.  Ok for patient to have ambien to sleep, and tums for heartburn.  Check blood sugar now and again in AM before breakfast.  Patient updated on POC and verbalized understanding.

## 2017-11-12 ENCOUNTER — ANESTHESIA (OUTPATIENT)
Dept: OBGYN | Facility: CLINIC | Age: 27
End: 2017-11-12
Payer: COMMERCIAL

## 2017-11-12 ENCOUNTER — ANESTHESIA EVENT (OUTPATIENT)
Dept: OBGYN | Facility: CLINIC | Age: 27
End: 2017-11-12
Payer: COMMERCIAL

## 2017-11-12 PROBLEM — Z98.891 S/P PRIMARY LOW TRANSVERSE C-SECTION: Status: ACTIVE | Noted: 2017-11-12

## 2017-11-12 LAB
ABO + RH BLD: NORMAL
ALT SERPL W P-5'-P-CCNC: 20 U/L (ref 0–50)
AST SERPL W P-5'-P-CCNC: 18 U/L (ref 0–45)
BLD GP AB SCN SERPL QL: NORMAL
BLOOD BANK CMNT PATIENT-IMP: NORMAL
BLOOD BANK CMNT PATIENT-IMP: NORMAL
CREAT SERPL-MCNC: 1.64 MG/DL (ref 0.52–1.04)
ERYTHROCYTE [DISTWIDTH] IN BLOOD BY AUTOMATED COUNT: 13 % (ref 10–15)
GFR SERPL CREATININE-BSD FRML MDRD: 38 ML/MIN/1.7M2
GLUCOSE BLDC GLUCOMTR-MCNC: 104 MG/DL (ref 70–99)
GLUCOSE BLDC GLUCOMTR-MCNC: 108 MG/DL (ref 70–99)
GLUCOSE BLDC GLUCOMTR-MCNC: 81 MG/DL (ref 70–99)
GLUCOSE BLDC GLUCOMTR-MCNC: 91 MG/DL (ref 70–99)
GLUCOSE BLDC GLUCOMTR-MCNC: 93 MG/DL (ref 70–99)
GLUCOSE BLDC GLUCOMTR-MCNC: 94 MG/DL (ref 70–99)
GLUCOSE BLDC GLUCOMTR-MCNC: 96 MG/DL (ref 70–99)
GLUCOSE BLDC GLUCOMTR-MCNC: 97 MG/DL (ref 70–99)
GLUCOSE BLDC GLUCOMTR-MCNC: 99 MG/DL (ref 70–99)
GLUCOSE SERPL-MCNC: 97 MG/DL (ref 70–99)
HCT VFR BLD AUTO: 39.1 % (ref 35–47)
HGB BLD-MCNC: 13.2 G/DL (ref 11.7–15.7)
HGB BLD-MCNC: 13.3 G/DL (ref 11.7–15.7)
KETONES BLD-SCNC: 0.2 MMOL/L (ref 0–0.6)
MCH RBC QN AUTO: 31.1 PG (ref 26.5–33)
MCHC RBC AUTO-ENTMCNC: 34 G/DL (ref 31.5–36.5)
MCV RBC AUTO: 91 FL (ref 78–100)
PLATELET # BLD AUTO: 223 10E9/L (ref 150–450)
RBC # BLD AUTO: 4.28 10E12/L (ref 3.8–5.2)
SPECIMEN EXP DATE BLD: NORMAL
SPECIMEN EXP DATE BLD: NORMAL
URATE SERPL-MCNC: 6.2 MG/DL (ref 2.6–6)
WBC # BLD AUTO: 25.4 10E9/L (ref 4–11)

## 2017-11-12 PROCEDURE — 59510 CESAREAN DELIVERY: CPT | Performed by: OBSTETRICS & GYNECOLOGY

## 2017-11-12 PROCEDURE — 27210794 ZZH OR GENERAL SUPPLY STERILE: Performed by: OBSTETRICS & GYNECOLOGY

## 2017-11-12 PROCEDURE — 36000056 ZZH SURGERY LEVEL 3 1ST 30 MIN: Performed by: OBSTETRICS & GYNECOLOGY

## 2017-11-12 PROCEDURE — 25000128 H RX IP 250 OP 636: Performed by: OBSTETRICS & GYNECOLOGY

## 2017-11-12 PROCEDURE — 36000058 ZZH SURGERY LEVEL 3 EA 15 ADDTL MIN: Performed by: OBSTETRICS & GYNECOLOGY

## 2017-11-12 PROCEDURE — 84550 ASSAY OF BLOOD/URIC ACID: CPT | Performed by: OBSTETRICS & GYNECOLOGY

## 2017-11-12 PROCEDURE — 37000008 ZZH ANESTHESIA TECHNICAL FEE, 1ST 30 MIN: Performed by: OBSTETRICS & GYNECOLOGY

## 2017-11-12 PROCEDURE — 00HU33Z INSERTION OF INFUSION DEVICE INTO SPINAL CANAL, PERCUTANEOUS APPROACH: ICD-10-PCS | Performed by: ANESTHESIOLOGY

## 2017-11-12 PROCEDURE — 36415 COLL VENOUS BLD VENIPUNCTURE: CPT | Performed by: OBSTETRICS & GYNECOLOGY

## 2017-11-12 PROCEDURE — 25000125 ZZHC RX 250: Performed by: NURSE ANESTHETIST, CERTIFIED REGISTERED

## 2017-11-12 PROCEDURE — 37000011 ZZH ANESTHESIA WARD SERVICE

## 2017-11-12 PROCEDURE — 25000132 ZZH RX MED GY IP 250 OP 250 PS 637: Performed by: OBSTETRICS & GYNECOLOGY

## 2017-11-12 PROCEDURE — 3E0P7VZ INTRODUCTION OF HORMONE INTO FEMALE REPRODUCTIVE, VIA NATURAL OR ARTIFICIAL OPENING: ICD-10-PCS | Performed by: OBSTETRICS & GYNECOLOGY

## 2017-11-12 PROCEDURE — 37000009 ZZH ANESTHESIA TECHNICAL FEE, EACH ADDTL 15 MIN: Performed by: OBSTETRICS & GYNECOLOGY

## 2017-11-12 PROCEDURE — 71000012 ZZH RECOVERY PHASE 1 LEVEL 1 FIRST HR: Performed by: OBSTETRICS & GYNECOLOGY

## 2017-11-12 PROCEDURE — 84460 ALANINE AMINO (ALT) (SGPT): CPT | Performed by: OBSTETRICS & GYNECOLOGY

## 2017-11-12 PROCEDURE — 25000125 ZZHC RX 250: Performed by: OBSTETRICS & GYNECOLOGY

## 2017-11-12 PROCEDURE — 82565 ASSAY OF CREATININE: CPT | Performed by: OBSTETRICS & GYNECOLOGY

## 2017-11-12 PROCEDURE — 71000013 ZZH RECOVERY PHASE 1 LEVEL 1 EA ADDTL HR: Performed by: OBSTETRICS & GYNECOLOGY

## 2017-11-12 PROCEDURE — 25000128 H RX IP 250 OP 636

## 2017-11-12 PROCEDURE — 84450 TRANSFERASE (AST) (SGOT): CPT | Performed by: OBSTETRICS & GYNECOLOGY

## 2017-11-12 PROCEDURE — 82947 ASSAY GLUCOSE BLOOD QUANT: CPT | Performed by: OBSTETRICS & GYNECOLOGY

## 2017-11-12 PROCEDURE — 12000029 ZZH R&B OB INTERMEDIATE

## 2017-11-12 PROCEDURE — 25000128 H RX IP 250 OP 636: Performed by: NURSE ANESTHETIST, CERTIFIED REGISTERED

## 2017-11-12 PROCEDURE — 3E0R3BZ INTRODUCTION OF ANESTHETIC AGENT INTO SPINAL CANAL, PERCUTANEOUS APPROACH: ICD-10-PCS | Performed by: ANESTHESIOLOGY

## 2017-11-12 PROCEDURE — 40000671 ZZH STATISTIC ANESTHESIA CASE

## 2017-11-12 PROCEDURE — 82010 KETONE BODYS QUAN: CPT | Performed by: OBSTETRICS & GYNECOLOGY

## 2017-11-12 PROCEDURE — 85027 COMPLETE CBC AUTOMATED: CPT | Performed by: OBSTETRICS & GYNECOLOGY

## 2017-11-12 PROCEDURE — 00000146 ZZHCL STATISTIC GLUCOSE BY METER IP

## 2017-11-12 PROCEDURE — 25000128 H RX IP 250 OP 636: Performed by: ANESTHESIOLOGY

## 2017-11-12 RX ORDER — EPHEDRINE SULFATE 50 MG/ML
INJECTION, SOLUTION INTRAMUSCULAR; INTRAVENOUS; SUBCUTANEOUS
Status: DISCONTINUED
Start: 2017-11-12 | End: 2017-11-13 | Stop reason: HOSPADM

## 2017-11-12 RX ORDER — DIPHENHYDRAMINE HYDROCHLORIDE 50 MG/ML
25 INJECTION INTRAMUSCULAR; INTRAVENOUS EVERY 6 HOURS PRN
Status: DISCONTINUED | OUTPATIENT
Start: 2017-11-12 | End: 2017-11-15 | Stop reason: HOSPADM

## 2017-11-12 RX ORDER — LABETALOL HYDROCHLORIDE 5 MG/ML
80 INJECTION, SOLUTION INTRAVENOUS EVERY 10 MIN PRN
Status: DISCONTINUED | OUTPATIENT
Start: 2017-11-12 | End: 2017-11-15 | Stop reason: HOSPADM

## 2017-11-12 RX ORDER — MORPHINE SULFATE 1 MG/ML
4 INJECTION, SOLUTION EPIDURAL; INTRATHECAL; INTRAVENOUS ONCE
Status: DISCONTINUED | OUTPATIENT
Start: 2017-11-12 | End: 2017-11-12

## 2017-11-12 RX ORDER — DEXTROSE, SODIUM CHLORIDE, SODIUM LACTATE, POTASSIUM CHLORIDE, AND CALCIUM CHLORIDE 5; .6; .31; .03; .02 G/100ML; G/100ML; G/100ML; G/100ML; G/100ML
INJECTION, SOLUTION INTRAVENOUS CONTINUOUS
Status: DISCONTINUED | OUTPATIENT
Start: 2017-11-12 | End: 2017-11-15 | Stop reason: HOSPADM

## 2017-11-12 RX ORDER — SODIUM CHLORIDE, SODIUM LACTATE, POTASSIUM CHLORIDE, CALCIUM CHLORIDE 600; 310; 30; 20 MG/100ML; MG/100ML; MG/100ML; MG/100ML
INJECTION, SOLUTION INTRAVENOUS CONTINUOUS
Status: DISCONTINUED | OUTPATIENT
Start: 2017-11-12 | End: 2017-11-12

## 2017-11-12 RX ORDER — HYDROMORPHONE HYDROCHLORIDE 1 MG/ML
.3-.5 INJECTION, SOLUTION INTRAMUSCULAR; INTRAVENOUS; SUBCUTANEOUS EVERY 30 MIN PRN
Status: DISCONTINUED | OUTPATIENT
Start: 2017-11-12 | End: 2017-11-15 | Stop reason: HOSPADM

## 2017-11-12 RX ORDER — OXYTOCIN/0.9 % SODIUM CHLORIDE 30/500 ML
1-24 PLASTIC BAG, INJECTION (ML) INTRAVENOUS CONTINUOUS
Status: DISCONTINUED | OUTPATIENT
Start: 2017-11-12 | End: 2017-11-12

## 2017-11-12 RX ORDER — SCOLOPAMINE TRANSDERMAL SYSTEM 1 MG/1
1 PATCH, EXTENDED RELEASE TRANSDERMAL ONCE
Status: DISCONTINUED | OUTPATIENT
Start: 2017-11-12 | End: 2017-11-12

## 2017-11-12 RX ORDER — PROCHLORPERAZINE MALEATE 10 MG
10 TABLET ORAL ONCE
Status: COMPLETED | OUTPATIENT
Start: 2017-11-12 | End: 2017-11-12

## 2017-11-12 RX ORDER — SIMETHICONE 80 MG
80 TABLET,CHEWABLE ORAL 4 TIMES DAILY PRN
Status: DISCONTINUED | OUTPATIENT
Start: 2017-11-12 | End: 2017-11-15 | Stop reason: HOSPADM

## 2017-11-12 RX ORDER — NALOXONE HYDROCHLORIDE 0.4 MG/ML
.1-.4 INJECTION, SOLUTION INTRAMUSCULAR; INTRAVENOUS; SUBCUTANEOUS
Status: DISCONTINUED | OUTPATIENT
Start: 2017-11-12 | End: 2017-11-12

## 2017-11-12 RX ORDER — CITRIC ACID/SODIUM CITRATE 334-500MG
30 SOLUTION, ORAL ORAL
Status: COMPLETED | OUTPATIENT
Start: 2017-11-12 | End: 2017-11-12

## 2017-11-12 RX ORDER — CALCIUM GLUCONATE 94 MG/ML
1 INJECTION, SOLUTION INTRAVENOUS
Status: DISCONTINUED | OUTPATIENT
Start: 2017-11-12 | End: 2017-11-15 | Stop reason: HOSPADM

## 2017-11-12 RX ORDER — NALBUPHINE HYDROCHLORIDE 10 MG/ML
2.5-5 INJECTION, SOLUTION INTRAMUSCULAR; INTRAVENOUS; SUBCUTANEOUS EVERY 6 HOURS PRN
Status: DISCONTINUED | OUTPATIENT
Start: 2017-11-12 | End: 2017-11-12

## 2017-11-12 RX ORDER — OXYTOCIN/0.9 % SODIUM CHLORIDE 30/500 ML
PLASTIC BAG, INJECTION (ML) INTRAVENOUS PRN
Status: DISCONTINUED | OUTPATIENT
Start: 2017-11-12 | End: 2017-11-12

## 2017-11-12 RX ORDER — LANOLIN 100 %
OINTMENT (GRAM) TOPICAL
Status: DISCONTINUED | OUTPATIENT
Start: 2017-11-12 | End: 2017-11-15 | Stop reason: HOSPADM

## 2017-11-12 RX ORDER — MAGNESIUM SULFATE HEPTAHYDRATE 40 MG/ML
4 INJECTION, SOLUTION INTRAVENOUS ONCE
Status: DISCONTINUED | OUTPATIENT
Start: 2017-11-12 | End: 2017-11-12

## 2017-11-12 RX ORDER — ONDANSETRON 2 MG/ML
4 INJECTION INTRAMUSCULAR; INTRAVENOUS EVERY 6 HOURS PRN
Status: DISCONTINUED | OUTPATIENT
Start: 2017-11-12 | End: 2017-11-15 | Stop reason: HOSPADM

## 2017-11-12 RX ORDER — HYDROMORPHONE HYDROCHLORIDE 1 MG/ML
.3-.5 INJECTION, SOLUTION INTRAMUSCULAR; INTRAVENOUS; SUBCUTANEOUS EVERY 5 MIN PRN
Status: DISCONTINUED | OUTPATIENT
Start: 2017-11-12 | End: 2017-11-12

## 2017-11-12 RX ORDER — LABETALOL HYDROCHLORIDE 5 MG/ML
10 INJECTION, SOLUTION INTRAVENOUS
Status: DISCONTINUED | OUTPATIENT
Start: 2017-11-12 | End: 2017-11-12

## 2017-11-12 RX ORDER — CEFAZOLIN SODIUM 1 G/3ML
1 INJECTION, POWDER, FOR SOLUTION INTRAMUSCULAR; INTRAVENOUS SEE ADMIN INSTRUCTIONS
Status: DISCONTINUED | OUTPATIENT
Start: 2017-11-12 | End: 2017-11-12

## 2017-11-12 RX ORDER — CEFAZOLIN SODIUM 2 G/100ML
2 INJECTION, SOLUTION INTRAVENOUS
Status: COMPLETED | OUTPATIENT
Start: 2017-11-12 | End: 2017-11-12

## 2017-11-12 RX ORDER — LIDOCAINE 40 MG/G
CREAM TOPICAL
Status: DISCONTINUED | OUTPATIENT
Start: 2017-11-12 | End: 2017-11-15 | Stop reason: HOSPADM

## 2017-11-12 RX ORDER — LIDOCAINE HCL/EPINEPHRINE/PF 2%-1:200K
VIAL (ML) INJECTION PRN
Status: DISCONTINUED | OUTPATIENT
Start: 2017-11-12 | End: 2017-11-12

## 2017-11-12 RX ORDER — PRENATAL VIT/IRON FUM/FOLIC AC 27MG-0.8MG
1 TABLET ORAL DAILY
Status: DISCONTINUED | OUTPATIENT
Start: 2017-11-13 | End: 2017-11-15 | Stop reason: HOSPADM

## 2017-11-12 RX ORDER — DIMENHYDRINATE 50 MG/ML
25 INJECTION, SOLUTION INTRAMUSCULAR; INTRAVENOUS
Status: DISCONTINUED | OUTPATIENT
Start: 2017-11-12 | End: 2017-11-12

## 2017-11-12 RX ORDER — OXYTOCIN/0.9 % SODIUM CHLORIDE 30/500 ML
340 PLASTIC BAG, INJECTION (ML) INTRAVENOUS CONTINUOUS PRN
Status: DISCONTINUED | OUTPATIENT
Start: 2017-11-12 | End: 2017-11-15 | Stop reason: HOSPADM

## 2017-11-12 RX ORDER — LABETALOL HYDROCHLORIDE 5 MG/ML
40 INJECTION, SOLUTION INTRAVENOUS EVERY 10 MIN PRN
Status: DISCONTINUED | OUTPATIENT
Start: 2017-11-12 | End: 2017-11-15 | Stop reason: HOSPADM

## 2017-11-12 RX ORDER — FENTANYL CITRATE 50 UG/ML
25-50 INJECTION, SOLUTION INTRAMUSCULAR; INTRAVENOUS
Status: DISCONTINUED | OUTPATIENT
Start: 2017-11-12 | End: 2017-11-12

## 2017-11-12 RX ORDER — GLYCOPYRROLATE 0.2 MG/ML
INJECTION, SOLUTION INTRAMUSCULAR; INTRAVENOUS PRN
Status: DISCONTINUED | OUTPATIENT
Start: 2017-11-12 | End: 2017-11-12

## 2017-11-12 RX ORDER — METHYLERGONOVINE MALEATE 0.2 MG/ML
INJECTION INTRAVENOUS PRN
Status: DISCONTINUED | OUTPATIENT
Start: 2017-11-12 | End: 2017-11-12

## 2017-11-12 RX ORDER — ONDANSETRON 2 MG/ML
4 INJECTION INTRAMUSCULAR; INTRAVENOUS EVERY 30 MIN PRN
Status: DISCONTINUED | OUTPATIENT
Start: 2017-11-12 | End: 2017-11-12

## 2017-11-12 RX ORDER — LIDOCAINE 40 MG/G
CREAM TOPICAL
Status: DISCONTINUED | OUTPATIENT
Start: 2017-11-12 | End: 2017-11-12

## 2017-11-12 RX ORDER — EPHEDRINE SULFATE 50 MG/ML
5 INJECTION, SOLUTION INTRAMUSCULAR; INTRAVENOUS; SUBCUTANEOUS
Status: DISCONTINUED | OUTPATIENT
Start: 2017-11-12 | End: 2017-11-12

## 2017-11-12 RX ORDER — MORPHINE SULFATE 1 MG/ML
INJECTION, SOLUTION EPIDURAL; INTRATHECAL; INTRAVENOUS PRN
Status: DISCONTINUED | OUTPATIENT
Start: 2017-11-12 | End: 2017-11-12

## 2017-11-12 RX ORDER — FENTANYL CITRATE 50 UG/ML
100 INJECTION, SOLUTION INTRAMUSCULAR; INTRAVENOUS ONCE
Status: COMPLETED | OUTPATIENT
Start: 2017-11-12 | End: 2017-11-12

## 2017-11-12 RX ORDER — HYDRALAZINE HYDROCHLORIDE 20 MG/ML
2.5-5 INJECTION INTRAMUSCULAR; INTRAVENOUS EVERY 10 MIN PRN
Status: DISCONTINUED | OUTPATIENT
Start: 2017-11-12 | End: 2017-11-12

## 2017-11-12 RX ORDER — MISOPROSTOL 200 UG/1
400 TABLET ORAL
Status: DISCONTINUED | OUTPATIENT
Start: 2017-11-12 | End: 2017-11-15 | Stop reason: HOSPADM

## 2017-11-12 RX ORDER — NALOXONE HYDROCHLORIDE 0.4 MG/ML
.1-.4 INJECTION, SOLUTION INTRAMUSCULAR; INTRAVENOUS; SUBCUTANEOUS
Status: DISCONTINUED | OUTPATIENT
Start: 2017-11-12 | End: 2017-11-15 | Stop reason: HOSPADM

## 2017-11-12 RX ORDER — MAGNESIUM SULFATE IN WATER 40 MG/ML
2 INJECTION, SOLUTION INTRAVENOUS CONTINUOUS
Status: DISCONTINUED | OUTPATIENT
Start: 2017-11-12 | End: 2017-11-12

## 2017-11-12 RX ORDER — ONDANSETRON 4 MG/1
4 TABLET, ORALLY DISINTEGRATING ORAL EVERY 30 MIN PRN
Status: DISCONTINUED | OUTPATIENT
Start: 2017-11-12 | End: 2017-11-12

## 2017-11-12 RX ORDER — ONDANSETRON 4 MG/1
4 TABLET, ORALLY DISINTEGRATING ORAL EVERY 6 HOURS PRN
Status: DISCONTINUED | OUTPATIENT
Start: 2017-11-12 | End: 2017-11-12

## 2017-11-12 RX ORDER — KETOROLAC TROMETHAMINE 30 MG/ML
30 INJECTION, SOLUTION INTRAMUSCULAR; INTRAVENOUS EVERY 6 HOURS
Status: DISPENSED | OUTPATIENT
Start: 2017-11-12 | End: 2017-11-13

## 2017-11-12 RX ORDER — LABETALOL HYDROCHLORIDE 5 MG/ML
20 INJECTION, SOLUTION INTRAVENOUS EVERY 10 MIN PRN
Status: DISCONTINUED | OUTPATIENT
Start: 2017-11-12 | End: 2017-11-15 | Stop reason: HOSPADM

## 2017-11-12 RX ORDER — OXYTOCIN 10 [USP'U]/ML
10 INJECTION, SOLUTION INTRAMUSCULAR; INTRAVENOUS
Status: DISCONTINUED | OUTPATIENT
Start: 2017-11-12 | End: 2017-11-15 | Stop reason: HOSPADM

## 2017-11-12 RX ORDER — ONDANSETRON 2 MG/ML
4 INJECTION INTRAMUSCULAR; INTRAVENOUS EVERY 6 HOURS PRN
Status: DISCONTINUED | OUTPATIENT
Start: 2017-11-12 | End: 2017-11-12

## 2017-11-12 RX ORDER — MAGNESIUM SULFATE HEPTAHYDRATE 40 MG/ML
4 INJECTION, SOLUTION INTRAVENOUS
Status: DISCONTINUED | OUTPATIENT
Start: 2017-11-12 | End: 2017-11-12

## 2017-11-12 RX ORDER — SODIUM CHLORIDE, SODIUM LACTATE, POTASSIUM CHLORIDE, CALCIUM CHLORIDE 600; 310; 30; 20 MG/100ML; MG/100ML; MG/100ML; MG/100ML
10-125 INJECTION, SOLUTION INTRAVENOUS CONTINUOUS
Status: DISCONTINUED | OUTPATIENT
Start: 2017-11-12 | End: 2017-11-12

## 2017-11-12 RX ORDER — PROCHLORPERAZINE 25 MG
25 SUPPOSITORY, RECTAL RECTAL ONCE
Status: COMPLETED | OUTPATIENT
Start: 2017-11-12 | End: 2017-11-12

## 2017-11-12 RX ORDER — MAGNESIUM SULFATE HEPTAHYDRATE 500 MG/ML
4 INJECTION, SOLUTION INTRAMUSCULAR; INTRAVENOUS
Status: DISCONTINUED | OUTPATIENT
Start: 2017-11-12 | End: 2017-11-15 | Stop reason: HOSPADM

## 2017-11-12 RX ORDER — OXYTOCIN/0.9 % SODIUM CHLORIDE 30/500 ML
100 PLASTIC BAG, INJECTION (ML) INTRAVENOUS CONTINUOUS
Status: DISCONTINUED | OUTPATIENT
Start: 2017-11-12 | End: 2017-11-15 | Stop reason: HOSPADM

## 2017-11-12 RX ORDER — DEXAMETHASONE SODIUM PHOSPHATE 4 MG/ML
INJECTION, SOLUTION INTRA-ARTICULAR; INTRALESIONAL; INTRAMUSCULAR; INTRAVENOUS; SOFT TISSUE PRN
Status: DISCONTINUED | OUTPATIENT
Start: 2017-11-12 | End: 2017-11-12

## 2017-11-12 RX ORDER — HYDROCORTISONE 2.5 %
CREAM (GRAM) TOPICAL 3 TIMES DAILY PRN
Status: DISCONTINUED | OUTPATIENT
Start: 2017-11-12 | End: 2017-11-15 | Stop reason: HOSPADM

## 2017-11-12 RX ORDER — BISACODYL 10 MG
10 SUPPOSITORY, RECTAL RECTAL DAILY PRN
Status: DISCONTINUED | OUTPATIENT
Start: 2017-11-14 | End: 2017-11-15 | Stop reason: HOSPADM

## 2017-11-12 RX ORDER — NIFEDIPINE 10 MG/1
10 CAPSULE ORAL
Status: DISCONTINUED | OUTPATIENT
Start: 2017-11-12 | End: 2017-11-15 | Stop reason: HOSPADM

## 2017-11-12 RX ORDER — DIPHENHYDRAMINE HCL 25 MG
25 CAPSULE ORAL EVERY 6 HOURS PRN
Status: DISCONTINUED | OUTPATIENT
Start: 2017-11-12 | End: 2017-11-15 | Stop reason: HOSPADM

## 2017-11-12 RX ORDER — AMOXICILLIN 250 MG
1-2 CAPSULE ORAL 2 TIMES DAILY
Status: DISCONTINUED | OUTPATIENT
Start: 2017-11-12 | End: 2017-11-15 | Stop reason: HOSPADM

## 2017-11-12 RX ORDER — IBUPROFEN 400 MG/1
400-800 TABLET, FILM COATED ORAL EVERY 6 HOURS PRN
Status: DISCONTINUED | OUTPATIENT
Start: 2017-11-13 | End: 2017-11-13

## 2017-11-12 RX ORDER — LORAZEPAM 2 MG/ML
2 INJECTION INTRAMUSCULAR
Status: DISCONTINUED | OUTPATIENT
Start: 2017-11-12 | End: 2017-11-15 | Stop reason: HOSPADM

## 2017-11-12 RX ADMIN — SALINE NASAL SPRAY 2 SPRAY: 1.5 SOLUTION NASAL at 16:25

## 2017-11-12 RX ADMIN — OXYTOCIN-SODIUM CHLORIDE 0.9% IV SOLN 30 UNIT/500ML 1 ML: 30-0.9/5 SOLUTION at 19:55

## 2017-11-12 RX ADMIN — KETOROLAC TROMETHAMINE 30 MG: 30 INJECTION, SOLUTION INTRAMUSCULAR at 22:42

## 2017-11-12 RX ADMIN — OXYTOCIN-SODIUM CHLORIDE 0.9% IV SOLN 30 UNIT/500ML 199 ML: 30-0.9/5 SOLUTION at 20:22

## 2017-11-12 RX ADMIN — CEFAZOLIN SODIUM 2 G: 2 INJECTION, SOLUTION INTRAVENOUS at 19:36

## 2017-11-12 RX ADMIN — MORPHINE SULFATE 4 MG: 1 INJECTION EPIDURAL; INTRATHECAL; INTRAVENOUS at 19:54

## 2017-11-12 RX ADMIN — SODIUM CHLORIDE, POTASSIUM CHLORIDE, SODIUM LACTATE AND CALCIUM CHLORIDE 1000 ML: 600; 310; 30; 20 INJECTION, SOLUTION INTRAVENOUS at 19:06

## 2017-11-12 RX ADMIN — FENTANYL CITRATE 100 MCG: 50 INJECTION, SOLUTION INTRAMUSCULAR; INTRAVENOUS at 02:35

## 2017-11-12 RX ADMIN — SODIUM CHLORIDE, SODIUM LACTATE, POTASSIUM CHLORIDE, CALCIUM CHLORIDE AND DEXTROSE MONOHYDRATE: 5; 600; 310; 30; 20 INJECTION, SOLUTION INTRAVENOUS at 06:53

## 2017-11-12 RX ADMIN — FENTANYL CITRATE 100 MCG: 50 INJECTION INTRAMUSCULAR; INTRAVENOUS at 08:45

## 2017-11-12 RX ADMIN — Medication: at 02:35

## 2017-11-12 RX ADMIN — SODIUM CHLORIDE, POTASSIUM CHLORIDE, SODIUM LACTATE AND CALCIUM CHLORIDE: 600; 310; 30; 20 INJECTION, SOLUTION INTRAVENOUS at 20:20

## 2017-11-12 RX ADMIN — SODIUM CITRATE AND CITRIC ACID MONOHYDRATE 30 ML: 500; 334 SOLUTION ORAL at 19:17

## 2017-11-12 RX ADMIN — PHENYLEPHRINE HYDROCHLORIDE 50 MCG: 10 INJECTION, SOLUTION INTRAMUSCULAR; INTRAVENOUS; SUBCUTANEOUS at 20:11

## 2017-11-12 RX ADMIN — DEXAMETHASONE SODIUM PHOSPHATE 4 MG: 4 INJECTION, SOLUTION INTRA-ARTICULAR; INTRALESIONAL; INTRAMUSCULAR; INTRAVENOUS; SOFT TISSUE at 19:38

## 2017-11-12 RX ADMIN — SODIUM CHLORIDE, POTASSIUM CHLORIDE, SODIUM LACTATE AND CALCIUM CHLORIDE: 600; 310; 30; 20 INJECTION, SOLUTION INTRAVENOUS at 09:45

## 2017-11-12 RX ADMIN — SODIUM CHLORIDE, SODIUM LACTATE, POTASSIUM CHLORIDE, CALCIUM CHLORIDE AND DEXTROSE MONOHYDRATE: 5; 600; 310; 30; 20 INJECTION, SOLUTION INTRAVENOUS at 14:59

## 2017-11-12 RX ADMIN — Medication: at 15:04

## 2017-11-12 RX ADMIN — FENTANYL CITRATE 100 MCG: 50 INJECTION INTRAMUSCULAR; INTRAVENOUS at 14:08

## 2017-11-12 RX ADMIN — METHYLERGONOVINE MALEATE 200 MCG: 0.2 INJECTION INTRAVENOUS at 20:16

## 2017-11-12 RX ADMIN — ONDANSETRON 4 MG: 2 INJECTION INTRAMUSCULAR; INTRAVENOUS at 19:38

## 2017-11-12 RX ADMIN — GLYCOPYRROLATE 0.2 MG: 0.2 INJECTION, SOLUTION INTRAMUSCULAR; INTRAVENOUS at 19:38

## 2017-11-12 RX ADMIN — Medication 20 MG: at 22:17

## 2017-11-12 RX ADMIN — PROCHLORPERAZINE EDISYLATE 10 MG: 5 INJECTION INTRAMUSCULAR; INTRAVENOUS at 22:50

## 2017-11-12 RX ADMIN — OXYTOCIN-SODIUM CHLORIDE 0.9% IV SOLN 30 UNIT/500ML 2 MILLI-UNITS/MIN: 30-0.9/5 SOLUTION at 10:49

## 2017-11-12 RX ADMIN — ONDANSETRON 4 MG: 2 INJECTION INTRAMUSCULAR; INTRAVENOUS at 09:39

## 2017-11-12 RX ADMIN — LIDOCAINE HYDROCHLORIDE,EPINEPHRINE BITARTRATE 15 ML: 20; .005 INJECTION, SOLUTION EPIDURAL; INFILTRATION; INTRACAUDAL; PERINEURAL at 19:37

## 2017-11-12 ASSESSMENT — ENCOUNTER SYMPTOMS
DYSRHYTHMIAS: 0
SEIZURES: 0
STRIDOR: 0

## 2017-11-12 ASSESSMENT — COPD QUESTIONNAIRES: COPD: 0

## 2017-11-12 ASSESSMENT — LIFESTYLE VARIABLES: TOBACCO_USE: 0

## 2017-11-12 NOTE — PROVIDER NOTIFICATION
11/12/17 0222   Provider Notification   Provider Name/Title Dr. Ronquillo   Method of Notification At Bedside   Request Evaluate in Person   Notification Reason Pain   Comments   Comments epidural placement

## 2017-11-12 NOTE — PROVIDER NOTIFICATION
11/12/17 1406   Provider Notification   Provider Name/Title Dr Forde   Method of Notification At Bedside   Request Evaluate in Person     MDA at bedside for epidural rebolus per pt preference

## 2017-11-12 NOTE — PROVIDER NOTIFICATION
"   11/12/17 1630   Provider Notification   Provider Name/Title Dr. Villarreal   Method of Notification Phone   Request Evaluate - Remote   Notification Reason Status Update   MD updated on FHT's with now just LD occurring and labor interventions of repositioning and now FHT's now tachycardic, contraction pattern, MVU's, and patient wondering what the plan is regarding minimal cervical change and per patient \"I am tired.\"  MD discussed wanting to give the full 4 hours of adequate labor, which would be 1830 and then reassess cervix and notify MD to discuss POC.  Will update patient on POC.   "

## 2017-11-12 NOTE — PROGRESS NOTES
Pt seen at bedside.  Has epidural in place.  Now C/O increased pain.  Level is at T8.  Pt given 8ml of 0.25% bupiv + 100mcg fent as re-bolus.  No complications.  Got good relief.  Start time:1359  End time:1411    Mahesh Forde MD

## 2017-11-12 NOTE — ANESTHESIA PREPROCEDURE EVALUATION
PAC NOTE:       ANESTHESIA PRE EVALUATION:  Anesthesia Evaluation       history and physical reviewed .             ROS/MED HX    ENT/Pulmonary:  - neg pulmonary ROS     Neurologic:  - neg neurologic ROS     Cardiovascular:  - neg cardiovascular ROS       METS/Exercise Tolerance:     Hematologic:         Musculoskeletal:         GI/Hepatic:  - neg GI/hepatic ROS       Renal/Genitourinary:         Endo:         Psychiatric:         Infectious Disease:         Malignancy:         Other:                     Physical Exam  Normal systems: cardiovascular, pulmonary and dental    Airway   Mallampati: II    Dental     Cardiovascular       Pulmonary     Other findings: .Lab Test        08/10/17     05/21/17     04/06/17                       1134          2240          1014          WBC          11.3*        13.1*        8.8           HGB          10.0*        11.7         12.9          MCV          94           88           91            PLT          313          321          353            Lab Test        11/11/17 05/21/17                       0140          2240          NA            --          137           POTASSIUM     --          3.4           CHLORIDE      --          104           CO2           --          26            BUN           --          6*            CR            --          0.52          ANIONGAP      --          7             JUSTICE           --          8.9           GLC          84           94              neg OB ROS            Anesthesia Plan  Procedure only, no anesthetic delivered    History & Physical Review      ASA Status:  2 .  OB Epidural Asa: 2       Plan for Epidural          Postoperative Care      Consents  Anesthetic plan, risks, benefits and alternatives discussed with:  Patient..                            .

## 2017-11-12 NOTE — PROVIDER NOTIFICATION
11/12/17 0937   Provider Notification   Provider Name/Title Dr Villarreal   Method of Notification In Department   Request Evaluate - Remote   Notification Reason Decels;Uterine Activity;Status Update     MD reviewed strip with decels and minimal variability. Updated on position change and O2 applied. Updated on blood sugars. Orders to start LR at 125ml/hr in addition to the D5LR running at 125ml/hr for a total of 250ml/hr of fluid. When baby is reactive ok to start pitocin.

## 2017-11-12 NOTE — PROGRESS NOTES
Pt seen at bedside.  Has epidural in place.  Now C/O increased pain.  Level is at T8.  Pt given 10ml of 0.25% bupiv + 100mcg fent as re-bolus.  No complications.  Got good relief.  Start time:1716  End time:1728    Mahesh Forde MD

## 2017-11-12 NOTE — PROGRESS NOTES
27 year old female   Obstetric History       T0      L0     SAB0   TAB0   Ectopic0   Multiple0   Live Births0     at 39w1d with A2 GDM, brought in for induction of labor 17 after midnight.   Received cervadil x1 for 7 hours; progressing spontaneously thereafter.    SROM occurred 0130 this AM.    CX currently 4.5/90/-1; has received epidural for anesthesia.    Episode of hypervariability with decels x 2; IV maintenance changed to D5NS at 125/LR at 125cc.    Currently reactive without decels; on pitocin for augmentation of contractions.    EFW 7 1/2 lbs.  Anticipate .

## 2017-11-12 NOTE — PROVIDER NOTIFICATION
11/11/17 3992   Provider Notification   Provider Name/Title Dr. Kumar   Method of Notification Phone   Request Evaluate - Remote   Notification Reason Labor Status;Uterine Activity;Status Update;SVE   Cervidil fell out at 1900.  Patient has had periods of tachysystole over the last 2 hours, has received fluid bolus.  Cervix is unchanged.  Per Dr. Kumar, do not replace cervidil.  Allow patient to labor naturally, ok to take off monitor intermittently.  Ok to use labor tub.  Will reevaluate in AM.  Patient and spouse updated on POC.

## 2017-11-12 NOTE — PROVIDER NOTIFICATION
11/12/17 0843   Provider Notification   Provider Name/Title Dr Forde   Method of Notification At Bedside   Request Evaluate in Person     MDA at bedside for epidural rebolus per pt preference.

## 2017-11-12 NOTE — PROVIDER NOTIFICATION
11/12/17 1526   Provider Notification   Provider Name/Title Dr. Villarreal   Method of Notification Phone   Request Evaluate - Remote   Notification Reason Status Update   MD updated on FHT's tachycardic, maternal temperature afebrile, and maternal HR.  MD also updated on contraction pattern on 9 uvaldo units of pitocin.  Orders received to recheck SVE by 1630 and continue to monitor.

## 2017-11-12 NOTE — PROVIDER NOTIFICATION
11/12/17 1351   Provider Notification   Provider Name/Title Dr Villarreal   Method of Notification Phone   Request Evaluate - Remote   Notification Reason SVE     MD updated on SVE and that baby has had minimal to moderate variability. Updated on interventions (position change, o2). Orders to place [facility] and continue to monitor.

## 2017-11-12 NOTE — ANESTHESIA PROCEDURE NOTES
Peripheral nerve/Neuraxial procedure note : epidural catheter  Pre-Procedure  Performed by MATTY RONQUILLO  Referred by IRAM,  Location:     Pre-Anesthestic Checklist: patient identified, IV checked, risks and benefits discussed, informed consent, monitors and equipment checked, pre-op evaluation and at physician/surgeon's request    Timeout  Correct Patient: Yes   Correct Procedure: Yes   Correct Site: Yes   Correct Laterality: N/A   Correct Position: Yes   Site Marked: N/A   .   Procedure Documentation    .    Procedure:    Epidural catheter.     .  .       Assessment/Narrative  .  .  . Comments:  .Diagnosis- Anticipated vaginal delivery    Continuous Labor Epidural, indication is for labor pain. Following an discussion of the expectations, benefits and risk (including but not limited to nerve damage, infection, bleeding, spinal headache, partial or failed block)--questions were encouraged and answered. The patient appears to understand, and consents to proceed.     The patient received a fluid bolus per orders    Time-out performed.     The patient was in the sitting position, a PVP prep times three was done in the lumbar area followed by placement of a sterile drape. The skin was then infiltrated with lidocaine. A 17ga Touhy needle was then advanced under a loss of resistance technique until the epidural space was identified. The epidural catheter was then advanced and secured. The catheter was then bolused in divided doses with Bupivicaine 0.25% 12 cc plus Fentanyl 100mcg (2cc), while the patient/fetus was monitored. Infusion orders written and infusion of 0.125% bupivicaine 15cc per hour started.    I or my partner, was immediately available and frequently monitored at necessary intervals.      KATHY Ronquillo

## 2017-11-13 LAB
GLUCOSE BLDC GLUCOMTR-MCNC: 74 MG/DL (ref 70–99)
GLUCOSE BLDC GLUCOMTR-MCNC: 89 MG/DL (ref 70–99)
GLUCOSE SERPL-MCNC: 116 MG/DL (ref 70–99)
HGB BLD-MCNC: 11.3 G/DL (ref 11.7–15.7)

## 2017-11-13 PROCEDURE — 25000132 ZZH RX MED GY IP 250 OP 250 PS 637: Performed by: OBSTETRICS & GYNECOLOGY

## 2017-11-13 PROCEDURE — 12000027 ZZH R&B OB

## 2017-11-13 PROCEDURE — 00000146 ZZHCL STATISTIC GLUCOSE BY METER IP

## 2017-11-13 PROCEDURE — 82947 ASSAY GLUCOSE BLOOD QUANT: CPT | Performed by: OBSTETRICS & GYNECOLOGY

## 2017-11-13 PROCEDURE — 36415 COLL VENOUS BLD VENIPUNCTURE: CPT | Performed by: OBSTETRICS & GYNECOLOGY

## 2017-11-13 PROCEDURE — 25000128 H RX IP 250 OP 636: Performed by: OBSTETRICS & GYNECOLOGY

## 2017-11-13 PROCEDURE — 85018 HEMOGLOBIN: CPT | Performed by: OBSTETRICS & GYNECOLOGY

## 2017-11-13 PROCEDURE — 40000084 ZZH STATISTIC IP LACTATION SERVICES 16-30 MIN

## 2017-11-13 RX ORDER — ACETAMINOPHEN 325 MG/1
975 TABLET ORAL 4 TIMES DAILY
Status: DISCONTINUED | OUTPATIENT
Start: 2017-11-13 | End: 2017-11-15 | Stop reason: HOSPADM

## 2017-11-13 RX ORDER — DEXTROSE MONOHYDRATE 25 G/50ML
25-50 INJECTION, SOLUTION INTRAVENOUS
Status: DISCONTINUED | OUTPATIENT
Start: 2017-11-13 | End: 2017-11-15 | Stop reason: HOSPADM

## 2017-11-13 RX ORDER — NICOTINE POLACRILEX 4 MG
15-30 LOZENGE BUCCAL
Status: DISCONTINUED | OUTPATIENT
Start: 2017-11-13 | End: 2017-11-15 | Stop reason: HOSPADM

## 2017-11-13 RX ORDER — ACETAMINOPHEN 650 MG/1
650 SUPPOSITORY RECTAL 4 TIMES DAILY
Status: DISCONTINUED | OUTPATIENT
Start: 2017-11-13 | End: 2017-11-15 | Stop reason: HOSPADM

## 2017-11-13 RX ORDER — IBUPROFEN 400 MG/1
400-800 TABLET, FILM COATED ORAL EVERY 6 HOURS PRN
Status: DISCONTINUED | OUTPATIENT
Start: 2017-11-13 | End: 2017-11-15 | Stop reason: HOSPADM

## 2017-11-13 RX ORDER — OXYCODONE HYDROCHLORIDE 5 MG/1
5-10 TABLET ORAL EVERY 4 HOURS PRN
Status: DISCONTINUED | OUTPATIENT
Start: 2017-11-13 | End: 2017-11-15 | Stop reason: HOSPADM

## 2017-11-13 RX ORDER — ACETAMINOPHEN 325 MG/1
650 TABLET ORAL EVERY 4 HOURS PRN
Status: DISCONTINUED | OUTPATIENT
Start: 2017-11-13 | End: 2017-11-15

## 2017-11-13 RX ADMIN — KETOROLAC TROMETHAMINE 30 MG: 30 INJECTION, SOLUTION INTRAMUSCULAR at 05:01

## 2017-11-13 RX ADMIN — SENNOSIDES AND DOCUSATE SODIUM 1 TABLET: 8.6; 5 TABLET ORAL at 22:30

## 2017-11-13 RX ADMIN — IBUPROFEN 800 MG: 400 TABLET ORAL at 16:21

## 2017-11-13 RX ADMIN — ACETAMINOPHEN 975 MG: 325 TABLET, FILM COATED ORAL at 18:00

## 2017-11-13 RX ADMIN — ACETAMINOPHEN 650 MG: 325 TABLET, FILM COATED ORAL at 05:01

## 2017-11-13 RX ADMIN — IBUPROFEN 800 MG: 400 TABLET ORAL at 22:30

## 2017-11-13 RX ADMIN — KETOROLAC TROMETHAMINE 30 MG: 30 INJECTION, SOLUTION INTRAMUSCULAR at 10:45

## 2017-11-13 RX ADMIN — ACETAMINOPHEN 975 MG: 325 TABLET, FILM COATED ORAL at 09:51

## 2017-11-13 RX ADMIN — SENNOSIDES AND DOCUSATE SODIUM 1 TABLET: 8.6; 5 TABLET ORAL at 22:42

## 2017-11-13 RX ADMIN — OXYCODONE HYDROCHLORIDE 5 MG: 5 TABLET ORAL at 22:30

## 2017-11-13 RX ADMIN — OXYCODONE HYDROCHLORIDE 5 MG: 5 TABLET ORAL at 18:01

## 2017-11-13 RX ADMIN — PRENATAL VIT W/ FE FUMARATE-FA TAB 27-0.8 MG 1 TABLET: 27-0.8 TAB at 09:08

## 2017-11-13 RX ADMIN — SODIUM CHLORIDE, SODIUM LACTATE, POTASSIUM CHLORIDE, CALCIUM CHLORIDE AND DEXTROSE MONOHYDRATE: 5; 600; 310; 30; 20 INJECTION, SOLUTION INTRAVENOUS at 05:38

## 2017-11-13 RX ADMIN — SENNOSIDES AND DOCUSATE SODIUM 1 TABLET: 8.6; 5 TABLET ORAL at 09:08

## 2017-11-13 NOTE — OP NOTE
Section Operative Note    INDICATION FOR PROCEDURE: Paulo Lema is a 27 year old female,   Obstetric History       T0      L0     SAB0   TAB0   Ectopic0   Multiple0   Live Births0     at 39w1d whose pregnancy was significant for history of A2GDM.   She was admitted for induction of labor; course of labor was significant for arrest of dilation at 5.5 cm for greater than 4 hours despite adequate uterine contractions.   The procedure was discussed in detail, alternatives were outlined.   Risks were discussed and informed consent was obtained. She was therefore taken to the operating room.   PREOPERATIVE DIAGNOSES:   1. Intrauterine pregnancy at 39w1d  2. A2 GDM  3. Arrest of dilation  POSTOPERATIVE DIAGNOSES:   1. Intrauterine pregnancy at 39w1d  2. A2 GDM  3. Arrest of dilation  FINDINGS: female infant, vertex presentation, Apgars 9 and 9, delivery weight 7 pounds 12 ounce.  Normal uterus, normal ovaries and tubes bilaterally.     ANESTHESIA: Spinal anesthesia.   SURGEON: Blanca Villarreal MD   PROCEDURE: Primary low transverse  section.   ESTIMATED BLOOD LOSS: 700 mL   URINE OUTPUT: 100 mL of clear yellow urine.   IV FLUIDS: 1500 mL of crystalloid  COMPLICATIONS: None.   DESCRIPTION OF PROCEDURE: The patient was taken to the operating room where spinal anesthesia was established without difficulty. She was then prepped and draped in the normal sterile fashion in the dorsal supine position with a right hip roll.     A pause for the cause was performed for proper identification of the patient, procedure and allergies. A Pfannenstiel skin incision was made with a scalpel and carried down to the underlying layer of fascia with the Bovie cautery. The fascia was incised with the Bovie cautery and extended on both sides. The superior aspect of the incision was grasped with Kocher clamps, elevated and the underlying rectus muscles were dissected off. The inferior aspect of the incision was  handled in the same manner. Entry into the abdominal cavity was made in a blunt fashion with good visualization of the bladder. This was extended superiorly and inferiorly.     The bladder blade was inserted. The vesicouterine peritoneum was incised with Metzenbaum scissors and extended bilaterally. The Kris-O retractor was reinserted after formation of the bladder flap. The uterus was incised in a transverse fashion over the lower uterine segment. This was extended bluntly. The fetal vertex was brought to the incision with subsequent delivery of the fetal vertex without further difficulty.  The delivery was completed without further difficulty and the cord was clamped x2 after a delay and cut and the baby was taken to the warmer.     Cord blood was collected. The placenta was delivered by expression. The uterus was exteriorized and cleared of all clots and debris. There was an extension of the uterine incision at the midline point of the lower incision edge, extending caudad approximately 4 cm.   This was repaired with 0 vicryl in a figure of eight at the apex, then locked continuous stitch.   The uterus was repaired with 0 Vicryl in a running locked fashion. A second imbricating layer was made with 0 Vicryl in a continuous fashion. Excellent hemostasis was then noted. The posterior cul-de-sac was irrigated. The incision was inspected again and again noted to be hemostatic. The uterus was returned to the abdomen. The right and left pericolic gutters were irrigated with warm saline. The incision was inspected a final time and again it was noted to be hemostatic.  The Kris-O retractor was removed.    The remaining surgical sites were evaluated and again hemostasis was noted throughout. The fascia was closed with 0 Vicryl in a continuous fashion. The subcutaneous tissue was irrigated and reapproximated with 0 vicryl in interrupted stitches. The skin was closed with 3-0 Monocryl in a subcuticular stitch.  Excellent  hemostasis was noted throughout. Sponge, lap and needle counts were correct x3. The patient tolerated the procedure well. She was taken to the recovery room in excellent condition.      MARIO MCCANN M.D.

## 2017-11-13 NOTE — ANESTHESIA POSTPROCEDURE EVALUATION
Patient: Paulo Lema    Procedure(s):   section - Wound Class: II-Clean Contaminated    Diagnosis:pregnancy  Diagnosis Additional Information: 1. Intrauterine pregnancy at 39w1d  2. A2 GDM  3. Arrest of dilation    Anesthesia Type:  Epidural    Note:  Anesthesia Post Evaluation    Patient location during evaluation: Bedside  Patient participation: Able to participate in evaluation but full recovery from regional anesthesia has not yet ocurrred but is anticipated to occur within 48 hours  Level of consciousness: awake  Pain management: adequate  Airway patency: patent  Cardiovascular status: acceptable  Respiratory status: acceptable  Hydration status: acceptable  PONV: controlled     Anesthetic complications: None          Last vitals:  Vitals:    175 17   BP: (!) 157/94 (!) 158/95 (!) 163/100   Resp:      Temp:      SpO2: 96%           Electronically Signed By: Caleb Valera MD  2017  10:04 PM

## 2017-11-13 NOTE — PLAN OF CARE
Problem: Patient Care Overview  Goal: Plan of Care/Patient Progress Review  Outcome: Improving  Patient doing well. Assisted out of bed. At 2 pm Cardenas removed. Walked in a hallway. Pain well managed with meds. Dressing removed, incision covered with steri strips. Fasting blood sugar in , before lunch 74. BP WDL. Slight increase in lower extremities edema. Patient was encouraged to walk and increase fluid intake. Breastfeeding well with assistance.

## 2017-11-13 NOTE — PROVIDER NOTIFICATION
11/12/17 2142   Provider Notification   Provider Name/Title Dr. Villarreal   Method of Notification Phone   Request Evaluate-Remote   Notification Reason Status Update   MD updated on BP's elevated with patient denying headaches, dizziness, and visual changes.  Orders received to place the antihypertensive order set and call if 160 systolic or greater or 110 or greater diastolic.  Orders also received for preeclampsia labs.

## 2017-11-13 NOTE — PROGRESS NOTES
[facility] Obstetrics Post-Op / Progress Note         Assessment and Plan:    Assessment:   Post-operative day #1  Low transverse primary  section  L&D complications: Failure to progress      Doing well.      Plan:   Ambulation encouraged           Interval History:   Doing well.  Pain is well-controlled.  No fevers.  No history of wound drainage, warmth or significant erythema.  Good appetite.  Denies chest pain, shortness of breath, nausea or vomiting.  Ambulatory.              Significant Problems:    None          Review of Systems:    The patient denies any chest pain, shortness of breath, excessive pain, fever, chills, purulent drainage from the wound, nausea or vomiting.          Medications:   All medications related to the patient's surgery have been reviewed          Physical Exam:   All vitals stable            Data:   All laboratory data related to this surgery reviewed  All imaging studies related to this surgery reviewed  Jeffery Patrick MD

## 2017-11-13 NOTE — PLAN OF CARE
Data: Paulo Lema transferred to 424 via cart at 0340. Baby transferred via crib.  Action: Receiving unit notified of transfer: Yes. Patient and family notified of room change. Report given to PEDRITO Mcclain. Belongings sent to receiving unit. Accompanied by Registered Nurse. Oriented patient to surroundings. Call light within reach. ID bands double-checked with receiving RN.  Response: Patient tolerated transfer and is stable.

## 2017-11-13 NOTE — ANESTHESIA PREPROCEDURE EVALUATION
PAC NOTE:       ANESTHESIA PRE EVALUATION:  Anesthesia Evaluation     . Pt has not had prior anesthetic            ROS/MED HX    ENT/Pulmonary:  - neg pulmonary ROS   (+)PELON risk factors obese, , . .   (-) tobacco use, asthma, COPD and recent URI   Neurologic:  - neg neurologic ROS    (-) seizures and CVA   Cardiovascular:  - neg cardiovascular ROS      (-) hypertension, CAD, arrhythmias and valvular problems/murmurs   METS/Exercise Tolerance:     Hematologic: Comments: Lab Test        08/10/17     05/21/17     04/06/17                       1134          2240          1014          WBC          11.3*        13.1*        8.8           HGB          10.0*        11.7         12.9          MCV          94           88           91            PLT          313          321          353            Lab Test        11/11/17 05/21/17                       0140          2240          NA            --          137           POTASSIUM     --          3.4           CHLORIDE      --          104           CO2           --          26            BUN           --          6*            CR            --          0.52          ANIONGAP      --          7             JUSTICE           --          8.9           GLC          84           94              (+) Anemia, -      Musculoskeletal:  - neg musculoskeletal ROS       GI/Hepatic:  - neg GI/hepatic ROS      (-) liver disease   Renal/Genitourinary:  - ROS Renal section negative       Endo:     (+) type II DM Last HgA1c: GDM Using insulin - not using insulin pump Obesity, .   (-) Type I DM, thyroid disease and chronic steroid usage   Psychiatric:  - neg psychiatric ROS       Infectious Disease:  - neg infectious disease ROS       Malignancy:      - no malignancy   Other:    (+) Possibly pregnant                    Physical Exam  Normal systems: cardiovascular, pulmonary and dental    Airway   Mallampati: II  TM distance: >3 FB  Neck ROM: full    Dental     Cardiovascular   Rhythm and  rate: regular and normal  (-) no friction rub, no systolic click and no murmur    Pulmonary    breath sounds clear to auscultation(-) no rhonchi, no decreased breath sounds, no wheezes, no rales and no stridor             Anesthesia Plan      History & Physical Review  History and physical reviewed and following examination; no interval change.    ASA Status:  2 .    NPO Status:  > 8 hours    Plan for Epidural   PONV prophylaxis:  Ondansetron (or other 5HT-3) and Dexamethasone or Solumedrol       Postoperative Care  Postoperative pain management:  IV analgesics and Multi-modal analgesia.      Consents  Anesthetic plan, risks, benefits and alternatives discussed with:  Patient or representative, Patient and Spouse..                            .

## 2017-11-13 NOTE — LACTATION NOTE
ARA to see patient and assist with latch.  Her baby's blood sugar is stable.  Baby is alert and rooting.  Hand expression was performed with good results.  Baby latched easily but fell asleep quickly on the left side.  When switched to the right side, she began tongue and cheek sucking.  ARA then moved her into a football hold in which she had a nutritive and active suck pattern.  Swallows pointed out to Paulo.  Plan for follow up tomorrow or prn.

## 2017-11-13 NOTE — PLAN OF CARE
Problem: Patient Care Overview  Goal: Plan of Care/Patient Progress Review  Outcome: Improving  Patient transferred to unit at 0340, blood pressures improving since administration of labetalol last evening. No hypertensive symptoms. Breastfeeding  with staff assistance on proper latch. Using nipple cream for comfort. Hand expression reviewed and demonstrated. Clear liquid diet at this time. Island dressing in place over incision, some marked drainage present. Fundus firm at at U/1, bleeding WNL. Patient denies pain, toradol and tylenol administered. Cardenas catheter in place draining medium mynor urine (beginning to clear) in adequate amounts. Able to reposition in bed independently, moves and tolerates well. Pitocin infusion completed, D5LR infusion initiated. Bonding well with . Spouse Brennan present and assistive in cares.

## 2017-11-13 NOTE — PROVIDER NOTIFICATION
17 1850   Provider Notification   Provider Name/Title Dr. Villarreal   Method of Notification Phone   Request Evaluate - Remote   Notification Reason Status Update   MD updated on FHT's with period of minimal varbility and tachycardia, contraction pattern, MVU's, and SVE.  MD offering patient a  section.  Will discuss with patient.

## 2017-11-13 NOTE — PROVIDER NOTIFICATION
11/12/17 2220   Provider Notification   Provider Name/Title Dr. Villarreal   Method of Notification At Bedside   Request Evaluate in Person   Notification Reason Status Update   MD updated on BP's and treated patient with labetalol 20 mg per hypertensive protocol.  Orders received to continue to monitor and administer IV labetalol per the protocol.  If 80 mg is given with no improvement, then contact MD. Orders also received for 10 mg of compazine for nausea.

## 2017-11-13 NOTE — ANESTHESIA CARE TRANSFER NOTE
Patient: Paulo Lema    Procedure(s):   section - Wound Class: II-Clean Contaminated    Diagnosis: pregnancy  Diagnosis Additional Information: No value filed.    Anesthesia Type:   Epidural     Note:  Airway :Room Air  Patient transferred to:Labor and Delivery  Comments: LINDSAY CORRALES, report to RNHandoff Report: Identifed the Patient, Identified the Reponsible Provider, Reviewed the pertinent medical history, Discussed the surgical course, Reviewed Intra-OP anesthesia mangement and issues during anesthesia, Set expectations for post-procedure period and Allowed opportunity for questions and acknowledgement of understanding      Vitals: (Last set prior to Anesthesia Care Transfer)    CRNA VITALS  2017 - 2017             NIBP: 136/73    Pulse: 116    NIBP Mean: 97    SpO2: 99 %                Electronically Signed By: Dean Dennis Severson, APRN CRNA  2017  8:41 PM

## 2017-11-14 LAB — GLUCOSE BLDC GLUCOMTR-MCNC: 70 MG/DL (ref 70–99)

## 2017-11-14 PROCEDURE — 00000146 ZZHCL STATISTIC GLUCOSE BY METER IP

## 2017-11-14 PROCEDURE — 25000132 ZZH RX MED GY IP 250 OP 250 PS 637: Performed by: OBSTETRICS & GYNECOLOGY

## 2017-11-14 PROCEDURE — 40000083 ZZH STATISTIC IP LACTATION SERVICES 1-15 MIN

## 2017-11-14 PROCEDURE — 12000027 ZZH R&B OB

## 2017-11-14 RX ADMIN — IBUPROFEN 800 MG: 400 TABLET ORAL at 06:02

## 2017-11-14 RX ADMIN — IBUPROFEN 800 MG: 400 TABLET ORAL at 15:56

## 2017-11-14 RX ADMIN — ACETAMINOPHEN 975 MG: 325 TABLET, FILM COATED ORAL at 18:36

## 2017-11-14 RX ADMIN — OXYCODONE HYDROCHLORIDE 5 MG: 5 TABLET ORAL at 15:56

## 2017-11-14 RX ADMIN — ACETAMINOPHEN 975 MG: 325 TABLET, FILM COATED ORAL at 10:00

## 2017-11-14 RX ADMIN — OXYCODONE HYDROCHLORIDE 5 MG: 5 TABLET ORAL at 22:37

## 2017-11-14 RX ADMIN — OXYCODONE HYDROCHLORIDE 5 MG: 5 TABLET ORAL at 06:02

## 2017-11-14 RX ADMIN — SENNOSIDES AND DOCUSATE SODIUM 1 TABLET: 8.6; 5 TABLET ORAL at 10:00

## 2017-11-14 RX ADMIN — PRENATAL VIT W/ FE FUMARATE-FA TAB 27-0.8 MG 1 TABLET: 27-0.8 TAB at 10:00

## 2017-11-14 RX ADMIN — IBUPROFEN 800 MG: 400 TABLET ORAL at 22:38

## 2017-11-14 RX ADMIN — SENNOSIDES AND DOCUSATE SODIUM 1 TABLET: 8.6; 5 TABLET ORAL at 22:37

## 2017-11-14 RX ADMIN — OXYCODONE HYDROCHLORIDE 5 MG: 5 TABLET ORAL at 18:36

## 2017-11-14 RX ADMIN — ACETAMINOPHEN 975 MG: 325 TABLET, FILM COATED ORAL at 02:10

## 2017-11-14 NOTE — PROGRESS NOTES
[facility] Obstetrics Post-Op / Progress Note         Assessment and Plan:    Assessment:   Post-operative day #2  Low transverse primary  section  L&D complications: pregnancy      Doing well.      Plan:   Anticipate discharge tomorrow           Interval History:   Doing well.  Pain is well-controlled.  No fevers.  No history of wound drainage, warmth or significant erythema.  Good appetite.  Denies chest pain, shortness of breath, nausea or vomiting.  Ambulatory.  Breastfeeding well.            Significant Problems:    None          Review of Systems:    The patient denies any chest pain, shortness of breath, excessive pain, fever, chills, purulent drainage from the wound, nausea or vomiting.          Medications:   All medications related to the patient's surgery have been reviewed          Physical Exam:     All vitals stable  Patient Vitals for the past 12 hrs:   BP Temp Temp src Pulse Resp   17 1000 113/80 97.8  F (36.6  C) Oral 76 16   17 0046 125/85 98.5  F (36.9  C) Oral 90 18     Wound clean and dry with minimal or no drainage.  Surrounding skin with minimal erythema.          Data:     All laboratory data related to this surgery reviewed  Hemoglobin   Date Value Ref Range Status   2017 11.3 (L) 11.7 - 15.7 g/dL Final   2017 13.3 11.7 - 15.7 g/dL Final   2017 13.2 11.7 - 15.7 g/dL Final   08/10/2017 10.0 (L) 11.7 - 15.7 g/dL Final   2017 11.7 11.7 - 15.7 g/dL Final     No imaging studies have been ordered  Mahesh Kumar MD, MD

## 2017-11-14 NOTE — PLAN OF CARE
Problem: Patient Care Overview  Goal: Plan of Care/Patient Progress Review  Outcome: No Change  Pt ambulating, voided x1  Incision CDI steri strips in place . Reports adequate pain control with current pain plan. Family present and supportive. Meeting expected goals. Mother attentive to infants needs breastfeeding.

## 2017-11-14 NOTE — LACTATION NOTE
LC to see patient.  Her baby has been nursing well.  No questions or concerns noted.  Good latching observed and suck to swallow ratio now 1:3.     Patient presented to ER with c/o  palpitation, has history of WPW and  SVTs on po amiodarone  Patient has prolong QT, will avoid Amiodarone and beta blockers.    Monitor for further VT episodes  Zoll pads at bedside with pacer pads on patient.   Keep K > 4.2 and magnesium > 2.2  Cardiovertion/defibrillation if unstable.   EP consult in am Patient presented to ER with c/o  palpitation, has history of WPW and  SVTs on po amiodarone  admit to CCU  Patient has prolong QT, will avoid Amiodarone and beta blockers.    Monitor for further VT episodes  Zoll pads at bedside with pacer pads on patient.   Keep K > 4.2 and magnesium > 2.2  Cardiovertion/defibrillation if unstable.   EP consult in am  trend CE q8hrs to r/o ACS Patient presented to ER with c/o  palpitation, has history of WPW and  SVTs on po amiodarone  admit to CCU  Patient has prolong QT, will avoid Amiodarone and beta blockers.    Monitor for further VT episodes  check CMP w/ mg and phos; Keep K > 4.2 and magnesium > 2.2.   f/u CBC, TSH  Zoll pads at bedside with pacer pads on patient.- defibrillation if unstable.   EP consult in am  trend CE q8hrs to r/o ACS Patient presented to ER with c/o  palpitation, has history of WPW and  SVTs on po amiodarone p/w to ED w/ VTs vs SVT  admit to CCU  Patient has prolong QT, will avoid Amiodarone and beta blockers.    Monitor for further VT episodes  check CMP w/ mg and phos; Keep K > 4.2 and magnesium > 2.2.   f/u CBC, TSH  Zoll pads at bedside with pacer pads on patient.- defibrillation if unstable.   EP consult in am  trend CE q8hrs to r/o ACS

## 2017-11-14 NOTE — PLAN OF CARE
Problem: Patient Care Overview  Goal: Plan of Care/Patient Progress Review  Outcome: Improving  Stable patient, vitals and postpartum assessment is within normal limits. Pt is up ad froilan and is independent with cares, voiding without difficulty. Pain well managed with Tylenol, Ibuprofen and Oxycodone.

## 2017-11-14 NOTE — PLAN OF CARE
Problem: Patient Care Overview  Goal: Plan of Care/Patient Progress Review  Outcome: Improving  Patient doing well. Took a shower. Walking in hallway. Using Tylenol and Ibuprofen for pain management. Blood sugars discontinued this morning. Encouraged to work on discharge paperwork.

## 2017-11-15 VITALS
TEMPERATURE: 98.1 F | HEIGHT: 63 IN | BODY MASS INDEX: 35.79 KG/M2 | SYSTOLIC BLOOD PRESSURE: 135 MMHG | RESPIRATION RATE: 16 BRPM | HEART RATE: 78 BPM | WEIGHT: 202 LBS | OXYGEN SATURATION: 97 % | DIASTOLIC BLOOD PRESSURE: 78 MMHG

## 2017-11-15 PROCEDURE — 25000132 ZZH RX MED GY IP 250 OP 250 PS 637: Performed by: OBSTETRICS & GYNECOLOGY

## 2017-11-15 PROCEDURE — 40000083 ZZH STATISTIC IP LACTATION SERVICES 1-15 MIN

## 2017-11-15 RX ORDER — OXYCODONE HYDROCHLORIDE 5 MG/1
5-10 TABLET ORAL EVERY 4 HOURS PRN
Qty: 30 TABLET | Refills: 0 | Status: ON HOLD | OUTPATIENT
Start: 2017-11-15 | End: 2017-11-29

## 2017-11-15 RX ORDER — IBUPROFEN 600 MG/1
600 TABLET, FILM COATED ORAL EVERY 6 HOURS PRN
Qty: 100 TABLET | Refills: 3 | Status: SHIPPED | OUTPATIENT
Start: 2017-11-15 | End: 2018-06-05

## 2017-11-15 RX ADMIN — ACETAMINOPHEN 975 MG: 325 TABLET, FILM COATED ORAL at 07:25

## 2017-11-15 RX ADMIN — PRENATAL VIT W/ FE FUMARATE-FA TAB 27-0.8 MG 1 TABLET: 27-0.8 TAB at 08:08

## 2017-11-15 RX ADMIN — ACETAMINOPHEN 975 MG: 325 TABLET, FILM COATED ORAL at 00:19

## 2017-11-15 RX ADMIN — OXYCODONE HYDROCHLORIDE 5 MG: 5 TABLET ORAL at 10:06

## 2017-11-15 RX ADMIN — IBUPROFEN 800 MG: 400 TABLET ORAL at 04:02

## 2017-11-15 RX ADMIN — IBUPROFEN 800 MG: 400 TABLET ORAL at 10:06

## 2017-11-15 RX ADMIN — SENNOSIDES AND DOCUSATE SODIUM 2 TABLET: 8.6; 5 TABLET ORAL at 08:08

## 2017-11-15 RX ADMIN — OXYCODONE HYDROCHLORIDE 5 MG: 5 TABLET ORAL at 04:02

## 2017-11-15 NOTE — PLAN OF CARE
Problem: Patient Care Overview  Goal: Plan of Care/Patient Progress Review  Outcome: Improving  Stable pt, reporting adequate pain control. Up ad froilan, meeting expected goals, spouse present and supportive.

## 2017-11-15 NOTE — DISCHARGE SUMMARY
[facility] Discharge Summary    Paulo Lema MRN# [id number]   Age: 27 year old YOB: 1990     Date of Admission:  2017  Date of Discharge::  11/15/2017   Admitting Physician:  Blanca Villarreal MD  Discharge Physician:  Kaykay Eastman DO     Home Clinic:  [facility]          Admission Diagnoses:   intrauterine pregnancy at 39 weeks  gestational diabetes A2          Discharge Diagnosis:   Same  S/p PLTCS for arrest of dilation / failure to progress          Procedures:   Procedure(s): Primary low transverse  section       No other procedures performed during this admission           Medications Prior to Admission:     Prescriptions Prior to Admission   Medication Sig Dispense Refill Last Dose     Prenatal Vit-Fe Fumarate-FA (PRENATAL MULTIVITAMIN  PLUS IRON) 27-0.8 MG TABS per tablet Take 1 tablet by mouth daily   11/10/2017 at Unknown time     Misc. Devices (BREAST PUMP) MISC 1 each daily 1 each 0      insulin isophane human (HUMULIN N PEN) 100 UNIT/ML injection Inject up to 12 units before bed every day. 30 mL 1 Taking     doxylamine (UNISOM) 25 MG TABS tablet Take 25 mg by mouth At Bedtime   Taking     insulin pen needle (INSUPEN PEN NEEDLES) 32G X 4 MM Use 1 pen needle daily or as directed. 100 each 1 Taking     blood glucose monitoring (DACIA MICROLET) lancets Use to test blood sugar 4 times daily or as directed. 100 each 1 Taking     acetone, Urine, test STRP Test once daily x1 week, then reduce to once weekly if consistently negative 100 each 3 Taking     DACIA CONTOUR NEXT test strip USE TO TEST BLOOD SUGAR FOUR TIMES DAILY OR AS DIRECTED 300 strip 3 Taking             Discharge Medications:     Current Discharge Medication List      START taking these medications    Details   oxyCODONE IR (ROXICODONE) 5 MG tablet Take 1-2 tablets (5-10 mg) by mouth every 4 hours as needed for pain maximum 8 tablet(s) per day  Qty: 30 tablet, Refills: 0    Associated  Diagnoses: S/P primary low transverse       ibuprofen (ADVIL/MOTRIN) 600 MG tablet Take 1 tablet (600 mg) by mouth every 6 hours as needed for moderate pain  Qty: 100 tablet, Refills: 3    Associated Diagnoses: S/P primary low transverse          CONTINUE these medications which have NOT CHANGED    Details   Prenatal Vit-Fe Fumarate-FA (PRENATAL MULTIVITAMIN  PLUS IRON) 27-0.8 MG TABS per tablet Take 1 tablet by mouth daily      Misc. Devices (BREAST PUMP) MISC 1 each daily  Qty: 1 each, Refills: 0      insulin isophane human (HUMULIN N PEN) 100 UNIT/ML injection Inject up to 12 units before bed every day.  Qty: 30 mL, Refills: 1    Associated Diagnoses: Insulin controlled gestational diabetes mellitus (GDM) in third trimester      doxylamine (UNISOM) 25 MG TABS tablet Take 25 mg by mouth At Bedtime      insulin pen needle (INSUPEN PEN NEEDLES) 32G X 4 MM Use 1 pen needle daily or as directed.  Qty: 100 each, Refills: 1    Associated Diagnoses: Gestational diabetes      blood glucose monitoring (DACIA MICROLET) lancets Use to test blood sugar 4 times daily or as directed.  Qty: 100 each, Refills: 1    Associated Diagnoses: Gestational diabetes      acetone, Urine, test STRP Test once daily x1 week, then reduce to once weekly if consistently negative  Qty: 100 each, Refills: 3    Associated Diagnoses: Gestational diabetes      DACIA CONTOUR NEXT test strip USE TO TEST BLOOD SUGAR FOUR TIMES DAILY OR AS DIRECTED  Qty: 300 strip, Refills: 3    Comments: **Patient requests 90 days supply**  Associated Diagnoses: Gestational diabetes                   Consultations:   No consultations were requested during this admission.          Brief History of Labor:   Ban is a  who presented at 39 /7 weeks gestation.     Admitted for induction of labor for A2 GDM; patient without further progress in labor past 5.5 cm for greater than 4 hours, despite adequate uterine contractions.               [facility]  Course:   The patient's hospital course was unremarkable.  She recovered as anticipated and experienced no post-operative complications.  On discharge, her pain was well controlled. Vaginal bleeding is similar to peak menstrual flow.  Voiding without difficulty.  Ambulating well and tolerating a normal diet.  No fever or significant wound drainage.  Breastfeeding well.  Infant is stable. +Flatus.  She was discharged on post-partum day #3.    Post-partum hemoglobin:   Hemoglobin   Date Value Ref Range Status   11/13/2017 11.3 (L) 11.7 - 15.7 g/dL Final             Discharge Instructions and Follow-Up:   Discharge diet: Diabetic (2000 ADA)   Discharge activity: Activity as tolerated, see instructions   Discharge follow-up: Follow up with OB provder in 2 weeks   Wound care: Steri-strips off in 7 days           Discharge Disposition:   Discharged to home      Attestation:  I have reviewed today's vital signs, notes, medications, labs and imaging.    Kaykay Eastman, DO

## 2017-11-15 NOTE — PLAN OF CARE
Problem: Patient Care Overview  Goal: Plan of Care/Patient Progress Review  Outcome: Therapy, progress toward functional goals as expected  Pt up ad-froialn.  Reports adequate pain control with current pain plan. Family present and supportive. Meeting expected goals. Mother attentive to infants needs.

## 2017-11-15 NOTE — DISCHARGE INSTRUCTIONS
Homecare  [phone]    Lactation [phone]    Post Partum Discharge Instructions after a  Section    What to expect:   No matter how well you feel when you get home, these first few days at home are for rest and recuperation. You can be surprised by how much rest it takes to fully recover from the delivery and how little extra time there is for tasks other than eating, sleeping and caring for your . Limit your activities to caring for yourself and your baby; eat, rest and relax. Let others clean, cook and supervise household needs (including older siblings). You will tire more quickly and this can be emotionally draining in a new situation without giving yourself a chance to recharge regularly.      Physical Changes:    Breast milk comes in within three to six days after delivery. When breast milk comes in, it is not unusual to get a low grade fever (max 101 F) and very hard, sensitive breasts with or without leaking.    Spotting or bleeding, like a period, on and off for six to eight weeks after delivery. Pattern of bleeding varies during the recovery period and will continue to vary with or without breastfeeding. Passing small to medium clots on occasion is also a normal experience.    Menstrual cramping may be more noticeable during and immediately following breastfeeding.    Legs and feet often swell more after delivery than before delivery; this usually returns to normal within two weeks postpartum.    Constipation is a common complaint - this can lead to significant abdominal pain and cramping. Drink plenty of fluids, eat fresh fruit and vegetables, as well as plenty of fiber.    Breast care:  Breastfeeding:    Breastfeeding can be difficult; learning how to breastfeed takes time and patience.     Your milk should come in 3-6 days after delivery. If you are not drinking enough fluids or had an unusual amount of blood loss at delivery, it may take a few days longer.    During the  first few days the baby is getting colostrum- this is high in antibodies which are good for the baby.    After nursing, remember to express a little breast milk and rub it on your nipples and allow them to air dry for 10-15 minutes after each feeding. You may also use pure lanolin or vitamin E oil or cream if you prefer.     If your breasts become engorged, try warm compresses or a warm shower, followed by feeding the baby or expressing milk to relieve the discomfort.    Bottle feeding:    Wear a tight fitting bra or bind your breasts with an ACE bandage. Ice packs will help relieve the soreness. Do not try to express the milk as this will only stimulate the production of more milk.    Emotions:    There are a wide range of emotions you may experience once you have gone home.    Many things will contribute to unusual feelings during the postpartum period:   -Feelings of anxiety about caring for your new baby   -Hormone changes   -Lack of sleep    Feelings of being overwhelmed, exhausted and out of your element are very normal. Be patient with yourself, this is a normal process of adjustment and you will get it with time.    You may be surprised at how fragile, alone, and overwhelmed you feel. About 70% of women have baby blues after childbirth. If you feel that your sadness is progressing to extreme sadness to a point of being unable to care for yourself or your baby, you may have postpartum depression. Counseling with a qualified therapist can be very helpful. If you do not know who to call or where to turn, call your physician.    Postpartum pain control:  It is normal to have abdominal pain following surgery. The goal is to get your pain level to a 3/10, enabling you to walk around comfortably. You may experience cramping for up to 1-2 weeks, particularly while breast feeding.     -Start your day by taking up to 2-4 tabs of regular strength ibuprofen (400-800mg), continue every 6 hours as needed for pain.    -You  can additionally take 1-2 tabs of tylenol (325-650mg regular strength or 500-1000mg extra strength), every 6 hours for additional pain control as needed. Take no more than 4,000 mg of tylenol in 24 hours.     - Narcotic pain medication is available for breakthrough pain, you may find that you need it every 3-6 hours or just at night. You can take 1-2 tabs of oxycodone, roxicodone, or norco as needed. If you are taking a formulation that contains tylenol (oxycodone, norco) do NOT take additional tylenol.     It is best to alternate your medications so you are taking something every 3 hours if you are having continued pain.    For example:  6am: ibuprofen 3 tabs (800 mg)   9am: tylenol 1000 mg  12pm: ibuprofen 800 mg  3pm: tylenol 1000 mg  6pm: ibuprofen 800 mg  9pm: tylenol 1000 mg  12am: ibuprofen 800 mg    Remember, you can use the oxycodone or other narcotic pain medication in between these times if needed for significant breakthrough pain that makes it hard to walk or function. This may be necessary for the first 3-7 days, especially.    If you have vaginal pain you can take sitz baths 1-2x/day. Fill the tub with 2-3 inches of warm water and soak the perineal and vaginal area for 10 minutes. This can be done 3-4 times a day. Ice or warm packs can also be applied for comfort.      Activity:    Rest as much as possible, limit visitors and take frequent naps at first    Do not lift anything heavier than your baby plus carrier for 3-4 weeks after a vaginal delivery. No lifting > 20lb for 6 weeks after a  delivery.     Showers are fine, no baths for two weeks.    If your bleeding increases or is bright red, you are doing too much.       Walk and climb stairs as tolerated     Avoid exercising for at least three weeks after a vaginal delivery and six weeks after a . Progress activity slowly.    Driving is not recommended for the first two weeks after your delivery; more restriction may apply. You should  never drive if you are taking prescription pain medication (oxycodone, norco).    Avoid intercourse for four to six weeks after delivery. Beware that severe vaginal dryness may cause significant discomfort with intercourse. This is normal, but can be unpleasant. Using Jelly/Astroglide/Replens or similar may be needed. Remember to use reliable contraception as pregnancy is possible (even when breastfeeding)!    Diet:    Drink at least eight glasses of water each day, especially if you are breastfeeding.    If breastfeeding, you will need 330kcal/day more than non-lactating women.     If not breastfeeding, you may resume your pre-pregnancy diet.    Continue taking in good calcium - at least four servings of calcium fortified foods per day.     Constipation:  You may use the following products to ease constipation:    1. Stool softeners such as metamucil or benefiber (over the counter)  2. Senna 1-2 times daily  3. Fiber supplements (over the counter)  4. Miralax (over the counter)  5. Colace  6. Dulcolax      Please be sure to keep adequately hydrated: 6-8 8oz glasses daily, more if needed to compensate for exercise, sweating, etc.      More specific dosing for constipation medications can be found below:    - Metamucil 28g daily PO with 8oz of water  - Senna-docusate 8.6-50 MG per tablet PO 1 tablet, Oral, 2 TIMES DAILY, Start with 1 tablet PO BID, reduce to 1 tablet daily when having daily BMs. Stop for loose stools.  - Docusate sodium (COLACE) capsule 100 mg, Oral, 2 TIMES DAILY. Stop for loose stools.  - Bisacodyl (DULCOLAX) suppository 10 mg, Rectal, DAILY PRN. Stop for loose stools.    Other Medications:  Keep taking your prenatal vitamin until you stop breastfeeding; if not breastfeeding, may stop one month after the delivery.  If you were on an iron supplement at the end of your pregnancy or after delivery, continue this for twelve weeks after delivery (or until prescription gone).    Staples:  Some patients  have non-dissolving staples placed to close their incision. If you are discharged home with non-dissolving staples in place, please call your obstetrician's office to have them removed 2-3 days after discharge.    Stitches:    Your stitches will dissolve by themselves - both  and episiotomy/vaginal stitches.      Use the pericare bottle given to you in the hospital to rinse off your bottom after using the bathroom and gently pat dry with tissue.    For  incisions, keep the site clean and dry. If there are steri-strips on your incision, you may remove them one week after your surgery     You may shower as often as you desire after delivery - either a vaginal or a  delivery.    Keep the incision open to air; no bandage needs to be kept on the incision.    If there is a small amount of liquid drainage - clear, red, white - this is normal and may be seen now and then. If there is a large amount of  drainage, increasing pain or redness of the incision and/or fever, call the office to be seen.      WHEN TO CALL-      Fever: 100.4 degrees or higher      Nausea and vomiting       Frequent and painful urination       Bleeding heavier than 1 pad per hour                                Open area of  incision                         Increasing pelvic/abdominal pain not controlled with your medications      Red, tender, painful area on the breast       Persistent perineal pain with increasing intensity                         Foul smelling discharge (increased volume of discharge is normal)      Pain, swelling, and tenderness in leg       Chest pain and cough       Severe headache, especially with visual changes    Please contact the clinic with any questions.  Please schedule a follow up appointment with your primary OB/Gyn provider in 2 weeks or sooner if you have any problems.   You can contact the clinic via [employer] or call [facility] at [phone].    Schedule your well baby visit as  directed by the pediatrician or family practice physician.

## 2017-11-15 NOTE — PROGRESS NOTES
"[facility] Obstetrics Post-Op / Progress Note         Assessment and Plan:    Assessment:   Post-operative day #3  Low transverse primary  section  L&D complications: Failure to progress, Arrest of Dilation   GDMA2    Doing well.      Plan:   Ambulation encouraged  2 hr glucola or other workup to rule out diabetes post partum  DC to home today           Interval History:   Doing well.  Pain is well-controlled.  No fevers.  No history of wound drainage, warmth or significant erythema.  Good appetite.  Denies chest pain, shortness of breath, nausea or vomiting.  Ambulatory. +Flatus.              Significant Problems:    None          Review of Systems:    The patient denies any chest pain, shortness of breath, excessive pain, fever, chills, purulent drainage from the wound, nausea or vomiting.          Medications:   All medications related to the patient's surgery have been reviewed          Physical Exam:   /78  Pulse 78  Temp 98.1  F (36.7  C) (Oral)  Resp 16  Ht 5' 2.99\" (1.6 m)  Wt 202 lb (91.6 kg)  LMP 02/10/2017 (Exact Date)  SpO2 97%  Breastfeeding? Unknown  BMI 35.79 kg/m2    General: no distress  Heart: RRR  Lungs: CTAB  Abdomen: no erythema, incision C/D/I with steri strips in place  Extremities: trace edema          Data:   All laboratory data related to this surgery reviewed  All imaging studies related to this surgery reviewed  Kaykay Eastman DO       "

## 2017-11-15 NOTE — LACTATION NOTE
LC follow up.  Baby continues to nurse well and her milk is in.  She hears swallows with every suck and has no concerns.  LC reviewed when to introduce pumping and milk storage.  She is aware she may call prn.

## 2017-11-15 NOTE — PLAN OF CARE
Discharge instructions reviewed and all questions answered. Meds given for home use. Pt has breast pump already. Homecare order faxed.

## 2017-11-15 NOTE — PLAN OF CARE
Problem: Patient Care Overview  Goal: Plan of Care/Patient Progress Review  Outcome: Improving  Patient doing well. Getting Tylenol, Oxycodone and Ibuprofen for pain management. Breastfeeding well. Steri strips intact.

## 2017-11-24 ENCOUNTER — NURSE TRIAGE (OUTPATIENT)
Dept: NURSING | Facility: CLINIC | Age: 27
End: 2017-11-24

## 2017-11-25 NOTE — TELEPHONE ENCOUNTER
"  Reason for Disposition    [1] MILD-MODERATE pain AND [2] constant AND [3] present > 2 hours      17 and went for longer walk today and every since severe cramping/contraction like quivering in lower abdomen/pelvis, and in to her lower back. Patient currently in a shower to see if that helps, she has also taken 400 mg Ibuprofen, and 1000 mg of Tylenol but still having some discomfort and wondering if she needs to go in to be seen or not. No other symptoms noted or assessed.    Additional Information    Negative: Shock suspected (e.g., cold/pale/clammy skin, too weak to stand, low BP, rapid pulse)    Negative: Difficult to awaken or acting confused  (e.g., disoriented, slurred speech)    Negative: Passed out (i.e., lost consciousness, collapsed and was not responding)    Negative: Sounds like a life-threatening emergency to the triager    Negative: Chest pain    Negative: Pain is mainly in upper abdomen  (if needed ask: \"is it mainly above the belly button?\")    Negative: Followed an abdomen (stomach) injury    Negative: [1] Abdominal pain AND [2] pregnant < 20 weeks    Negative: [1] Abdominal pain AND [2] pregnant > 20 weeks    Negative: [1] Abdominal pain AND [2] postpartum < 1 month since delivery    Negative: Patient sounds very sick or weak to the triager    Protocols used: ABDOMINAL PAIN - FEMALE-ADULT-      Paged on call OB Dr. Patrick via [employer] at 6:28 pm, call Pt. Paulo Pita [phone], 1990, MRN [id number], PCP Dr. Tsang,  delivery back on 17, went for longer walk today and now severe cramping, discomfort in lower abd, pelvis, and low back.  Kelley Olivera, RN, BSN  [facility]    "

## 2017-11-28 ENCOUNTER — OFFICE VISIT (OUTPATIENT)
Dept: OBGYN | Facility: CLINIC | Age: 27
End: 2017-11-28
Payer: COMMERCIAL

## 2017-11-28 ENCOUNTER — APPOINTMENT (OUTPATIENT)
Dept: ULTRASOUND IMAGING | Facility: CLINIC | Age: 27
End: 2017-11-28
Attending: EMERGENCY MEDICINE
Payer: COMMERCIAL

## 2017-11-28 ENCOUNTER — NURSE TRIAGE (OUTPATIENT)
Dept: NURSING | Facility: CLINIC | Age: 27
End: 2017-11-28

## 2017-11-28 ENCOUNTER — HOSPITAL ENCOUNTER (INPATIENT)
Facility: CLINIC | Age: 27
LOS: 1 days | Discharge: HOME | End: 2017-11-29
Attending: EMERGENCY MEDICINE | Admitting: HOSPITALIST
Payer: COMMERCIAL

## 2017-11-28 VITALS — WEIGHT: 185.4 LBS | DIASTOLIC BLOOD PRESSURE: 76 MMHG | BODY MASS INDEX: 32.85 KG/M2 | SYSTOLIC BLOOD PRESSURE: 122 MMHG

## 2017-11-28 DIAGNOSIS — Z98.891 S/P PRIMARY LOW TRANSVERSE C-SECTION: Primary | ICD-10-CM

## 2017-11-28 DIAGNOSIS — K80.20 CALCULUS OF GALLBLADDER WITHOUT CHOLECYSTITIS WITHOUT OBSTRUCTION: ICD-10-CM

## 2017-11-28 DIAGNOSIS — Z98.891 S/P PRIMARY LOW TRANSVERSE C-SECTION: ICD-10-CM

## 2017-11-28 DIAGNOSIS — K85.10 ACUTE BILIARY PANCREATITIS WITHOUT INFECTION OR NECROSIS: ICD-10-CM

## 2017-11-28 LAB
ALBUMIN SERPL-MCNC: 2.9 G/DL (ref 3.4–5)
ALP SERPL-CCNC: 126 U/L (ref 40–150)
ALT SERPL W P-5'-P-CCNC: 77 U/L (ref 0–50)
ANION GAP SERPL CALCULATED.3IONS-SCNC: 7 MMOL/L (ref 3–14)
AST SERPL W P-5'-P-CCNC: 122 U/L (ref 0–45)
BASOPHILS # BLD AUTO: 0.1 10E9/L (ref 0–0.2)
BASOPHILS NFR BLD AUTO: 0.5 %
BILIRUB SERPL-MCNC: 0.4 MG/DL (ref 0.2–1.3)
BUN SERPL-MCNC: 11 MG/DL (ref 7–30)
CALCIUM SERPL-MCNC: 8.6 MG/DL (ref 8.5–10.1)
CHLORIDE SERPL-SCNC: 107 MMOL/L (ref 94–109)
CO2 SERPL-SCNC: 26 MMOL/L (ref 20–32)
CREAT SERPL-MCNC: 0.77 MG/DL (ref 0.52–1.04)
DIFFERENTIAL METHOD BLD: NORMAL
EOSINOPHIL # BLD AUTO: 0.5 10E9/L (ref 0–0.7)
EOSINOPHIL NFR BLD AUTO: 4.7 %
ERYTHROCYTE [DISTWIDTH] IN BLOOD BY AUTOMATED COUNT: 11.9 % (ref 10–15)
GFR SERPL CREATININE-BSD FRML MDRD: >90 ML/MIN/1.7M2
GLUCOSE SERPL-MCNC: 103 MG/DL (ref 70–99)
HCT VFR BLD AUTO: 37 % (ref 35–47)
HGB BLD-MCNC: 12.4 G/DL (ref 11.7–15.7)
IMM GRANULOCYTES # BLD: 0.1 10E9/L (ref 0–0.4)
IMM GRANULOCYTES NFR BLD: 0.5 %
LIPASE SERPL-CCNC: 5705 U/L (ref 73–393)
LYMPHOCYTES # BLD AUTO: 1.8 10E9/L (ref 0.8–5.3)
LYMPHOCYTES NFR BLD AUTO: 17 %
MCH RBC QN AUTO: 30.4 PG (ref 26.5–33)
MCHC RBC AUTO-ENTMCNC: 33.5 G/DL (ref 31.5–36.5)
MCV RBC AUTO: 91 FL (ref 78–100)
MONOCYTES # BLD AUTO: 0.7 10E9/L (ref 0–1.3)
MONOCYTES NFR BLD AUTO: 6.9 %
NEUTROPHILS # BLD AUTO: 7.6 10E9/L (ref 1.6–8.3)
NEUTROPHILS NFR BLD AUTO: 70.4 %
NRBC # BLD AUTO: 0 10*3/UL
NRBC BLD AUTO-RTO: 0 /100
PLATELET # BLD AUTO: 393 10E9/L (ref 150–450)
POTASSIUM SERPL-SCNC: 3.4 MMOL/L (ref 3.4–5.3)
PROT SERPL-MCNC: 6.5 G/DL (ref 6.8–8.8)
RBC # BLD AUTO: 4.08 10E12/L (ref 3.8–5.2)
SODIUM SERPL-SCNC: 140 MMOL/L (ref 133–144)
WBC # BLD AUTO: 10.8 10E9/L (ref 4–11)

## 2017-11-28 PROCEDURE — 85025 COMPLETE CBC W/AUTO DIFF WBC: CPT | Performed by: EMERGENCY MEDICINE

## 2017-11-28 PROCEDURE — 25000128 H RX IP 250 OP 636: Performed by: PHYSICIAN ASSISTANT

## 2017-11-28 PROCEDURE — 83690 ASSAY OF LIPASE: CPT | Performed by: EMERGENCY MEDICINE

## 2017-11-28 PROCEDURE — 99285 EMERGENCY DEPT VISIT HI MDM: CPT | Mod: 25

## 2017-11-28 PROCEDURE — 99024 POSTOP FOLLOW-UP VISIT: CPT | Performed by: OBSTETRICS & GYNECOLOGY

## 2017-11-28 PROCEDURE — 80053 COMPREHEN METABOLIC PANEL: CPT | Performed by: EMERGENCY MEDICINE

## 2017-11-28 PROCEDURE — 76705 ECHO EXAM OF ABDOMEN: CPT

## 2017-11-28 PROCEDURE — 99223 1ST HOSP IP/OBS HIGH 75: CPT | Mod: AI | Performed by: HOSPITALIST

## 2017-11-28 PROCEDURE — 12000017 ZZH R&B PEDS INTERMEDIATE

## 2017-11-28 RX ORDER — AMOXICILLIN 250 MG
1 CAPSULE ORAL 2 TIMES DAILY PRN
Status: DISCONTINUED | OUTPATIENT
Start: 2017-11-28 | End: 2017-11-29 | Stop reason: HOSPADM

## 2017-11-28 RX ORDER — OXYCODONE HYDROCHLORIDE 5 MG/1
5-10 TABLET ORAL
Status: DISCONTINUED | OUTPATIENT
Start: 2017-11-28 | End: 2017-11-29 | Stop reason: HOSPADM

## 2017-11-28 RX ORDER — AMOXICILLIN 250 MG
2 CAPSULE ORAL 2 TIMES DAILY PRN
Status: DISCONTINUED | OUTPATIENT
Start: 2017-11-28 | End: 2017-11-29 | Stop reason: HOSPADM

## 2017-11-28 RX ORDER — SODIUM CHLORIDE 9 MG/ML
INJECTION, SOLUTION INTRAVENOUS CONTINUOUS
Status: DISCONTINUED | OUTPATIENT
Start: 2017-11-28 | End: 2017-11-29 | Stop reason: HOSPADM

## 2017-11-28 RX ORDER — HYDROMORPHONE HCL/0.9% NACL/PF 0.2MG/0.2
0.2 SYRINGE (ML) INTRAVENOUS
Status: DISCONTINUED | OUTPATIENT
Start: 2017-11-28 | End: 2017-11-29 | Stop reason: HOSPADM

## 2017-11-28 RX ORDER — NALOXONE HYDROCHLORIDE 0.4 MG/ML
.1-.4 INJECTION, SOLUTION INTRAMUSCULAR; INTRAVENOUS; SUBCUTANEOUS
Status: DISCONTINUED | OUTPATIENT
Start: 2017-11-28 | End: 2017-11-29 | Stop reason: HOSPADM

## 2017-11-28 RX ORDER — IBUPROFEN 600 MG/1
600 TABLET, FILM COATED ORAL EVERY 6 HOURS PRN
Status: DISCONTINUED | OUTPATIENT
Start: 2017-11-28 | End: 2017-11-29 | Stop reason: HOSPADM

## 2017-11-28 RX ORDER — ONDANSETRON 2 MG/ML
4 INJECTION INTRAMUSCULAR; INTRAVENOUS EVERY 6 HOURS PRN
Status: DISCONTINUED | OUTPATIENT
Start: 2017-11-28 | End: 2017-11-29 | Stop reason: HOSPADM

## 2017-11-28 RX ORDER — PROCHLORPERAZINE 25 MG
25 SUPPOSITORY, RECTAL RECTAL EVERY 12 HOURS PRN
Status: DISCONTINUED | OUTPATIENT
Start: 2017-11-28 | End: 2017-11-29 | Stop reason: HOSPADM

## 2017-11-28 RX ORDER — PROCHLORPERAZINE MALEATE 5 MG
10 TABLET ORAL EVERY 6 HOURS PRN
Status: DISCONTINUED | OUTPATIENT
Start: 2017-11-28 | End: 2017-11-29 | Stop reason: HOSPADM

## 2017-11-28 RX ORDER — ONDANSETRON 4 MG/1
4 TABLET, ORALLY DISINTEGRATING ORAL EVERY 6 HOURS PRN
Status: DISCONTINUED | OUTPATIENT
Start: 2017-11-28 | End: 2017-11-29 | Stop reason: HOSPADM

## 2017-11-28 RX ORDER — SENNOSIDES 25 MG/1
2 TABLET, FILM COATED ORAL DAILY
COMMUNITY
End: 2018-06-05

## 2017-11-28 RX ADMIN — SODIUM CHLORIDE: 9 INJECTION, SOLUTION INTRAVENOUS at 22:54

## 2017-11-28 ASSESSMENT — ENCOUNTER SYMPTOMS
NAUSEA: 1
VOMITING: 1
ABDOMINAL PAIN: 1

## 2017-11-28 NOTE — IP AVS SNAPSHOT
MRN:[id number]                      After Visit Summary   11/28/2017    Paulo Lema    MRN: [id number]           Thank you!     Thank you for choosing [facility] for your care. Our goal is always to provide you with excellent care. Hearing back from our patients is one way we can continue to improve our services. Please take a few minutes to complete the written survey that you may receive in the mail after you visit. If you would like to speak to someone directly about your visit please contact Patient Relations at [phone]. Thank you!          Patient Information     Date Of Birth          1990        Designated Caregiver       Most Recent Value    Caregiver    Will someone help with your care after discharge? yes    Name of designated caregiver tania lema/ tristian ferris    Phone number of caregiver [phone]    Caregiver address [place]      About your hospital stay     You were admitted on:  November 28, 2017 You last received care in the:  [facility] [place] [facility]    You were discharged on:  November 29, 2017       Who to Call     For medical emergencies, please call 911.  For non-urgent questions about your medical care, please call your primary care provider or clinic, [phone]  For questions related to your surgery, please call your surgery clinic        Attending Provider     Provider Specialty    Isaiah Ncihols MD Emergency Medicine    Tal Santa MD Family Practice       Primary Care Provider Office Phone # Fax #    Disha Tsang [phone]      After Care Instructions     Dressing       Keep dressing clean and dry.  Dressing / incisional care as instructed by RN and or Surgeon                  Your next 10 appointments already scheduled     Jan 03, 2018  1:30 PM CST   Post Partum with Disha Tsang MD   [facility]    [facility] [address]  [facility] [address]    [phone]              Further instructions from your care team       HOME CARE FOLLOWING LAPAROSCOPIC CHOLECYSTECTOMY  EVAN Peres E. Gavin, EVAN Bartlett, TEE Huynh    INCISIONAL CARE:  Replace the bandage over your incisions until all drainage stops, or if more comfortable to have in place.  If present, leave the steri-strips (white paper tapes) in place for 14 days after surgery.  If Dermabond (a type of skin glue) is present, leave in place until it wears/flakes off.     BATHING:  Avoid baths for 1 week after surgery.  Showers are okay.  You may wash your hair at any time.  Gently pat your incisions dry after bathing.    ACTIVITY:  Light Activity -- you may immediately be up and about as tolerated.  Driving -- you may drive when comfortable and off narcotic pain medications.  Light Work -- resume when comfortable off pain medications.  (If you can drive, you probably can work.)  Strenuous Work/Activity -- limit lifting to 20 pounds for 1 week.  Progressively increase with time.  Active Sports (running, biking, etc.) -- cautiously resume after 2 weeks.    DISCOMFORT:  Use pain medications as prescribed by your surgeon.  Take the pain medication with some food, when possible, to minimize side effects.  Intermittent use of ice packs at the incision sites may help during the first 48 hours.  Expect gradual improvement.  You may experience shoulder pain, which is due to the air placed within your abdomen during the procedure.  This is temporary and usually passes within 2 days.    DIET:  Drink plenty of fluids.  While taking pain medications, increase dietary fiber or add a fiber supplementation like Metamucil or Citrucel to help prevent constipation - a possible side effect of pain medications.  It is not uncommon to experience some bowel changes (loose stools or constipation) after surgery.  Your body has to adapt to you no longer having a gall bladder.  To help  minimize this side effect, avoid fatty foods for the first week after surgery.  You may then slowly increase the amount of fatty foods in your diet.      NAUSEA:  If nauseated from the anesthetic/pain meds; rest in bed, get up cautiously with assistance, and drink clear liquids (juice, tea, broth).    RETURN APPOINTMENT:  Schedule a follow-up visit 2-3 weeks post-op.  Office Phone:  [phone]     CONTACT US IF THE FOLLOWING DEVELOPS:   1. A fever that is above 101     2. If there is a large amount of drainage, bleeding, or swelling.   3. Severe pain that is not relieved by your prescription.   4. Drainage that is thick, cloudy, yellow, green or white.   5. Any other questions not answered by  Frequently Asked Questions  sheet.      FREQUENTLY ASKED QUESTIONS:    Q:  How should my incision look?    A:  Normally your incision will appear slightly swollen with light redness directly along the incision itself as it heals.  It may feel like a bump or ridge as the healing/scarring happens, and over time (3-4 months) this bump or ridge feeling should slowly go away.  In general, clear or pink watery drainage can be normal at first as your incision heals, but should decrease over time.    Q:  How do I know if my incision is infected?  A:  Look at your incision for signs of infection, like redness around the incision spreading to surrounding skin, or drainage of cloudy or foul-smelling drainage.  If you feel warm, check your temperature to see if you are running a fever.    **If any of these things occur, please notify the nurse at our office.  We may need you to come into the office for an incision check.      Q:  How do I take care of my incision?  A:  If you have a dressing in place - Starting the day after surgery, replace the dressing 1-2 times a day until there is no further drainage from the incision.  At that time, a dressing is no longer needed.  Try to minimize tape on the skin if irritation is occurring at the  tape sites.  If you have significant irritation from tape on the skin, please call the office to discuss other method of dressing your incision.    Small pieces of tape called  steri-strips  may be present directly overlying your incision; these may be removed 10 days after surgery unless otherwise specified by your surgeon.  If these tapes start to loosen at the ends, you may trim them back until they fall off or are removed.    A:  If you had  Dermabond  tissue glue used as a dressing (this causes your incision to look shiny with a clear covering over it) - This type of dressing wears off with time and does not require more dressings over the top unless it is draining around the glue as it wears off.  Do not apply ointments or lotions over the incisions until the glue has completely worn off.    Q:  There is a piece of tape or a sticky  lead  still on my skin.  Can I remove this?  A:  Sometimes the sticky  leads  used for monitoring during surgery or for evaluation in the emergency department are not all removed while you are in the hospital.  These sometimes have a tab or metal dot on them.  You can easily remove these on your own, like taking off a band-aid.  If there is a gel substance under the  lead , simply wipe/clean it off with a washcloth or paper towel.      Q:  What can I do to minimize constipation (very hard stools, or lack of stools)?  A:  Stay well hydrated.  Increase your dietary fiber intake or take a fiber supplement -with plenty of water.  Walk around frequently.  You may consider an over-the-counter stool-softener.  Your Pharmacist can assist you with choosing one that is stocked at your pharmacy.  Constipation is also one of the most common side effects of pain medication.  If you are using pain medication, be pro-active and try to PREVENT problems with constipation by taking the steps above BEFORE constipation becomes a problem.    Q:  What do I do if I need more pain medications?  A:  Call  the office to receive refills.  Be aware that certain pain meds cannot be called into a pharmacy and actually require a paper prescription.  A change may be made in your pain med as you progress thru your recovery period or if you have side effects to certain meds.    --Pain meds are NOT refilled after 5pm on weekdays, and NOT AT ALL on the weekends, so please look ahead to prevent problems.      Q:  Why am I having a hard time sleeping now that I am at home?  A:  Many medications you receive while you are in the hospital can impact your sleep for a number of days after your surgery/hospitalization.  Decreased level of activity and naps during the day may also make sleeping at night difficult.  Try to minimize day-time naps, and get up frequently during the day to walk around your home during your recovery time.  Sleep aides may be of some help, but are not recommended for long-term use.      Q:  I am having some back discomfort.  What should I do?  A:  This may be related to certain positioning that was required for your surgery, extended periods of time in bed, or other changes in your overall activity level.  You may try ice, heat, acetaminophen, or ibuprofen to treat this temporarily.  Note that many pain medications have acetaminophen in them and would state this on the prescription bottle.  Be sure not to exceed the maximum of 4000mg per day of acetaminophen.     **If the pain you are having does not resolve, is severe, or is a flare of back pain you have had on other occasions prior to surgery, please contact your primary physician for further recommendations or for an appointment to be examined at their office.    Q:  Why am I having headaches?  A:  Headaches can be caused by many things:  caffeine withdrawal, use of pain meds, dehydration, high blood pressure, lack of sleep, over-activity/exhaustion, flare-up of usual migraine headaches.  If you feel this is related to muscle tension (a band-like feeling  around the head, or a pressure at the low-back of the head) you may try ice or heat to this area.  You may need to drink more fluids (try electrolyte drink like Gatorade), rest, or take your usual migraine medications.   **If your headaches do not resolve, worsen, are accompanied by other symptoms, or if your blood pressure is high, please call your primary physician for recommendation and/or examination.    Q:  I am unable to urinate.  What do I do?  A:  A small percentage of people can have difficulty urinating initially after surgery.  This includes being able to urinate only a very small amount at a time and feeling discomfort or pressure in the very low abdomen.  This is called  urinary retention , and is actually an urgent situation.  Proceed to your nearest Emergency department for evaluation (not an Urgent Care Center).  Sometimes the bladder does not work correctly after certain medications you receive during surgery, or related to certain procedures.  You may need to have a catheter placed until your bladder recovers.  When planning to go to an Emergency department, it may help to call the ER to let them know you are coming in for this problem after a surgery.  This may help you get in quicker to be evaluated.  **If you have symptoms of a urinary tract infection, please contact your primary physician for the proper evaluation and treatment.    If you have other questions, please call the office Monday thru Friday between 8am and 5pm to discuss with the nurse or physician assistant.  #[phone]    There is a surgeon ON CALL on weekday evenings and over the weekend in case of urgent need only, and may be contacted at the same number.    If you are having an emergency, call 911 or proceed to your nearest emergency department.      GENERAL ANESTHESIA OR SEDATION ADULT DISCHARGE INSTRUCTIONS   SPECIAL PRECAUTIONS FOR 24 HOURS AFTER SURGERY    IT IS NOT UNUSUAL TO FEEL LIGHT-HEADED OR FAINT, UP TO 24 HOURS  "AFTER SURGERY OR WHILE TAKING PAIN MEDICATION.  IF YOU HAVE THESE SYMPTOMS; SIT FOR A FEW MINUTES BEFORE STANDING AND HAVE SOMEONE ASSIST YOU WHEN YOU GET UP TO WALK OR USE THE BATHROOM.    YOU SHOULD REST AND RELAX FOR THE NEXT 24 HOURS AND YOU MUST MAKE ARRANGEMENTS TO HAVE SOMEONE STAY WITH YOU FOR AT LEAST 24 HOURS AFTER YOUR DISCHARGE.  AVOID HAZARDOUS AND STRENUOUS ACTIVITIES.  DO NOT MAKE IMPORTANT DECISIONS FOR 24 HOURS.    DO NOT DRIVE ANY VEHICLE OR OPERATE MECHANICAL EQUIPMENT FOR 24 HOURS FOLLOWING THE END OF YOUR SURGERY.  EVEN THOUGH YOU MAY FEEL NORMAL, YOUR REACTIONS MAY BE AFFECTED BY THE MEDICATION YOU HAVE RECEIVED.    DO NOT DRINK ALCOHOLIC BEVERAGES FOR 24 HOURS FOLLOWING YOUR SURGERY.    DRINK CLEAR LIQUIDS (APPLE JUICE, GINGER ALE, 7-UP, BROTH, ETC.).  PROGRESS TO YOUR REGULAR DIET AS YOU FEEL ABLE.    YOU MAY HAVE A DRY MOUTH, A SORE THROAT, MUSCLES ACHES OR TROUBLE SLEEPING.  THESE SHOULD GO AWAY AFTER 24 HOURS.    CALL YOUR DOCTOR FOR ANY OF THE FOLLOWING:  SIGNS OF INFECTION (FEVER, GROWING TENDERNESS AT THE SURGERY SITE, A LARGE AMOUNT OF DRAINAGE OR BLEEDING, SEVERE PAIN, FOUL-SMELLING DRAINAGE, REDNESS OR SWELLING.    IT HAS BEEN OVER 8 TO 10 HOURS SINCE SURGERY AND YOU ARE STILL NOT ABLE TO URINATE (PASS WATER).       Diet - Clear liquids and crackers the night of the 29th  Resume normal diet Thursday November 30th.    Pending Results     Date and Time Order Name Status Description    11/29/2017 1426 Surgical pathology exam In process             Admission Information     Date & Time Provider Department Dept. Phone    11/28/2017 Tal Santa MD [facility] [phone]      Your Vitals Were     Blood Pressure Pulse Temperature Respirations Height Weight    146/79 61 98.5  F (36.9  C) (Axillary) 18 1.626 m (5' 4\") 81.6 kg (180 lb)    Pulse Oximetry BMI (Body Mass Index)                97% 30.9 kg/m2          [facility] Information     [facility] lets you send messages to " "your doctor, view your test results, renew your prescriptions, schedule appointments and more. To sign up, go to [email or web address] . Click on \"Log in\" on the left side of the screen, which will take you to the Welcome page. Then click on \"Sign up Now\" on the right side of the page.     You will be asked to enter the access code listed below, as well as some personal information. Please follow the directions to create your username and password.     Your access code is: [email or web address]  Expires: 2018  2:28 PM     Your access code will  in 90 days. If you need help or a new code, please call your [facility] clinic or [phone].        Care EveryWhere ID     This is your Care EveryWhere ID. This could be used by other organizations to access your [facility] medical records  [id number]        Equal Access to Services     LINDSAY RODRIGUEZ : Anel Martínez, wakj keys, qasanford kaalmashahram meek, sidra gresham . So [facility] [phone].    ATENCIÓN: Si habla español, tiene a goldne disposición servicios gratuitos de asistencia lingüística. Cindymatt al [phone].    We comply with applicable federal civil rights laws and [place] laws. We do not discriminate on the basis of race, color, national origin, age, disability, sex, sexual orientation, or gender identity.               Review of your medicines      START taking        Dose / Directions    ondansetron 4 MG ODT tab   Commonly known as:  ZOFRAN-ODT   Used for:  Acute biliary pancreatitis without infection or necrosis        Dose:  4-8 mg   Take 1-2 tablets (4-8 mg) by mouth every 8 hours as needed for nausea Dissolve ON the tongue.   Quantity:  4 tablet   Refills:  0         CONTINUE these medicines which have NOT CHANGED        Dose / Directions    acetone (Urine) test Strp   Used for:  Gestational diabetes        Test once daily x1 week, then reduce to once weekly if consistently negative   Quantity:  100 each "   Refills:  3       DACIA CONTOUR NEXT test strip   Used for:  Gestational diabetes   Generic drug:  blood glucose monitoring        USE TO TEST BLOOD SUGAR FOUR TIMES DAILY OR AS DIRECTED   Quantity:  300 strip   Refills:  3       blood glucose monitoring lancets   Used for:  Gestational diabetes        Use to test blood sugar 4 times daily or as directed.   Quantity:  100 each   Refills:  1       breast pump Misc        Dose:  1 each   1 each daily   Quantity:  1 each   Refills:  0       ibuprofen 600 MG tablet   Commonly known as:  ADVIL/MOTRIN   Used for:  S/P primary low transverse         Dose:  600 mg   Take 1 tablet (600 mg) by mouth every 6 hours as needed for moderate pain   Quantity:  100 tablet   Refills:  3       insulin pen needle 32G X 4 MM   Commonly known as:  INSUPEN PEN NEEDLES   Used for:  Gestational diabetes        Use 1 pen needle daily or as directed.   Quantity:  100 each   Refills:  1       oxyCODONE IR 5 MG tablet   Commonly known as:  ROXICODONE   Used for:  S/P primary low transverse         Dose:  5-10 mg   Take 1-2 tablets (5-10 mg) by mouth every 4 hours as needed for pain maximum 8 tablet(s) per day   Quantity:  20 tablet   Refills:  0       prenatal multivitamin plus iron 27-0.8 MG Tabs per tablet        Dose:  1 tablet   Take 1 tablet by mouth daily   Refills:  0       RA PROBIOTIC GUMMIES Chew        Dose:  2 chew tab   Take 2 chew tab by mouth daily   Refills:  0            Where to get your medicines      These medications were sent to [facility] [place], [place] - [address] [address] [address]     Phone:  [phone]     ondansetron 4 MG ODT tab         Some of these will need a paper prescription and others can be bought over the counter. Ask your nurse if you have questions.     Bring a paper prescription for each of these medications     oxyCODONE IR 5 MG tablet                Protect others around  you: Learn how to safely use, store and throw away your medicines at www.disposemymeds.org.             Medication List: This is a list of all your medications and when to take them. Check marks below indicate your daily home schedule. Keep this list as a reference.      Medications           Morning Afternoon Evening Bedtime As Needed    acetone (Urine) test Strp   Test once daily x1 week, then reduce to once weekly if consistently negative                                DACIA CONTOUR NEXT test strip   USE TO TEST BLOOD SUGAR FOUR TIMES DAILY OR AS DIRECTED   Generic drug:  blood glucose monitoring                                blood glucose monitoring lancets   Use to test blood sugar 4 times daily or as directed.                                breast pump Misc   1 each daily                                ibuprofen 600 MG tablet   Commonly known as:  ADVIL/MOTRIN   Take 1 tablet (600 mg) by mouth every 6 hours as needed for moderate pain                                insulin pen needle 32G X 4 MM   Commonly known as:  INSUPEN PEN NEEDLES   Use 1 pen needle daily or as directed.                                ondansetron 4 MG ODT tab   Commonly known as:  ZOFRAN-ODT   Take 1-2 tablets (4-8 mg) by mouth every 8 hours as needed for nausea Dissolve ON the tongue.                                oxyCODONE IR 5 MG tablet   Commonly known as:  ROXICODONE   Take 1-2 tablets (5-10 mg) by mouth every 4 hours as needed for pain maximum 8 tablet(s) per day                                prenatal multivitamin plus iron 27-0.8 MG Tabs per tablet   Take 1 tablet by mouth daily                                RA PROBIOTIC GUMMIES Chew   Take 2 chew tab by mouth daily

## 2017-11-28 NOTE — TELEPHONE ENCOUNTER
Hilda states she is 2 weeks s/p  and having 7-8/10 upper abdominal pain radiating in to her back, not relieved with pain medication and icy/hot over the last 30 minutes. She had similar episode on  as well and has tried all recommendations. Hilda also notes no pain radiating to her shoulder, jaw, and no difficulty breathing. She has low grade fever of 99.4. She notes the pain is taking her breath away and was seen in clinic today but very concerned about these symptoms.  Reason for Disposition    [1] MILD-MODERATE pain AND [2] not relieved by antacids    Additional Information    Negative: Severe difficulty breathing (e.g., struggling for each breath, speaks in single words)    Negative: Shock suspected (e.g., cold/pale/clammy skin, too weak to stand, low BP, rapid pulse)    Negative: Difficult to awaken or acting confused  (e.g., disoriented, slurred speech)    Negative: Passed out (i.e., lost consciousness, collapsed and was not responding)    Negative: Visible sweat on face or sweat dripping down face    Negative: Sounds like a life-threatening emergency to the triager    Negative: Followed an abdomen (stomach) injury    Negative: Chest pain    Negative: [1] SEVERE pain (e.g., excruciating) AND [2] present > 1 hour    Negative: [1] Pain lasts > 10 minutes AND [2] age > 50    Negative: [1] Pain lasts > 10 minutes AND [2] age > 40 AND [3] associated chest, arm, neck, upper back or jaw pain    Negative: [1] Pain lasts > 10 minutes AND [2] age > 35 AND [3] at least one cardiac risk factor (i.e., hypertension, diabetes, obesity, smoker or strong family history of heart disease)    Negative: [1] Pain lasts > 10 minutes AND [2] history of heart disease (i.e., heart attack, bypass surgery, angina, angioplasty, CHF; not just a heart murmur)    Negative: [1] Pain lasts > 10 minutes AND [2] difficulty breathing    Negative: [1] Vomiting AND [2] contains red blood  (Exception: few streaks and only occurred  "once)    Negative: [1] Vomiting AND [2] contains black (\"coffee ground\") material    Negative: Blood in bowel movements  (Exception: Blood on surface of BM with constipation)    Negative: Black or tarry bowel movements  (Exception: chronic-unchanged  black-grey bowel movements AND is taking iron pills or Pepto-bismol)    Negative: [1] Pregnant > 24 weeks AND [2] hand or face swelling    Negative: Patient sounds very sick or weak to the triager    Negative: [1] MILD-MODERATE pain AND [2] constant AND [3] present > 2 hours    Protocols used: ABDOMINAL PAIN - UPPER-ADULT-AH      Paged on call Dr. Villarreal via [employer] at 5:38 pm \"Call pt aPulo Bates, [phone], 89. MRN [id number], PCP Dr. Tsang, 2 weeks s/p  and tonight having moderate to severe upper adominal/back pain, not relieved with meds or icy/hot.\"    Kelley Olivera, RN, BSN  [facility]      "

## 2017-11-28 NOTE — PROGRESS NOTES
SUBJECTIVE:                                                   Paulo Lema is a 27 year old female who presents to clinic today for the following health issue(s):  Postoperative visit    HPI:  She is status post primary low transverse  2 weeks ago. Reports light bleeding, no fevers, chills, or other concerns. No problems with bowel or bladder fxn, no issues with incision.      Problem list and histories reviewed & adjusted, as indicated.  Additional history: as documented.    Patient Active Problem List   Diagnosis     High risk pregnancy in young primigravida     Indication for care in labor or delivery     S/P primary low transverse      Past Surgical History:   Procedure Laterality Date      SECTION N/A 2017    Procedure:  SECTION;   section;  Surgeon: Blanca Villarreal MD;  Location:  L+D      Social History   Substance Use Topics     Smoking status: Never Smoker     Smokeless tobacco: Never Used     Alcohol use No      Problem (# of Occurrences) Relation (Name,Age of Onset)    Family History Negative (1) Mother              Current Outpatient Prescriptions on File Prior to Visit:  oxyCODONE IR (ROXICODONE) 5 MG tablet Take 1-2 tablets (5-10 mg) by mouth every 4 hours as needed for pain maximum 8 tablet(s) per day   ibuprofen (ADVIL/MOTRIN) 600 MG tablet Take 1 tablet (600 mg) by mouth every 6 hours as needed for moderate pain   Misc. Devices (BREAST PUMP) MISC 1 each daily   insulin isophane human (HUMULIN N PEN) 100 UNIT/ML injection Inject up to 12 units before bed every day.   doxylamine (UNISOM) 25 MG TABS tablet Take 25 mg by mouth At Bedtime   insulin pen needle (INSUPEN PEN NEEDLES) 32G X 4 MM Use 1 pen needle daily or as directed.   blood glucose monitoring ([employer]) lancets Use to test blood sugar 4 times daily or as directed.   acetone, Urine, test STRP Test once daily x1 week, then reduce to once weekly if consistently negative    Bidgely CONTOUR NEXT test strip USE TO TEST BLOOD SUGAR FOUR TIMES DAILY OR AS DIRECTED   Prenatal Vit-Fe Fumarate-FA (PRENATAL MULTIVITAMIN  PLUS IRON) 27-0.8 MG TABS per tablet Take 1 tablet by mouth daily     No current facility-administered medications on file prior to visit.   Allergies   Allergen Reactions     Reglan [Metoclopramide] Other (See Comments)     Light headed         OBJECTIVE:     /76 (BP Location: Right arm, Patient Position: Chair, Cuff Size: Adult Regular)  Wt 185 lb 6.4 oz (84.1 kg)  LMP 02/10/2017 (Exact Date)  BMI 32.85 kg/m2   BMI: Body mass index is 32.85 kg/(m^2).  General: Alert and oriented, no distress.  Psychiatric: Mood and affect within normal limits.  Abdomen: soft, nontender. Incision clean, dry, and intact.  In-Clinic Test Results:  No results found for this or any previous visit (from the past 24 hour(s)).    ASSESSMENT/PLAN:                                                        ICD-10-CM    1. S/P primary low transverse  Z98.891          May remove steri-strips. Follow up in 4 weeks for final postpartum exam.    Disha Tsang MD  [facility]

## 2017-11-28 NOTE — NURSING NOTE
"Chief Complaint   Patient presents with     Surgical Followup   Marleny Almaraz MA      Initial /76 (BP Location: Right arm, Patient Position: Chair, Cuff Size: Adult Regular)  Wt 185 lb 6.4 oz (84.1 kg)  LMP 02/10/2017 (Exact Date)  BMI 32.85 kg/m2 Estimated body mass index is 32.85 kg/(m^2) as calculated from the following:    Height as of 11/11/17: 5' 2.99\" (1.6 m).    Weight as of this encounter: 185 lb 6.4 oz (84.1 kg).  Medication Reconciliation: complete    "

## 2017-11-28 NOTE — MR AVS SNAPSHOT
"              After Visit Summary   2017    Paulo Lema    MRN: [id number]           Patient Information     Date Of Birth          1990        Visit Information        Provider Department      2017 10:30 AM Disha Tsang MD [facility]        Today's Diagnoses     S/P primary low transverse     -  1       Follow-ups after your visit        Your next 10 appointments already scheduled     2018  1:30 PM CST   Post Partum with Disha Tsang MD   [facility]    [address]  [facility] [id number] [phone]              Who to contact     If you have questions or need follow up information about today's clinic visit or your schedule please contact [facility] directly at [phone].  Normal or non-critical lab and imaging results will be communicated to you by MyChart, letter or phone within 4 business days after the clinic has received the results. If you do not hear from us within 7 days, please contact the clinic through MyChart or phone. If you have a critical or abnormal lab result, we will notify you by phone as soon as possible.  Submit refill requests through [employer] or call your pharmacy and they will forward the refill request to us. Please allow 3 business days for your refill to be completed.          Additional Information About Your Visit        MyChart Information     [employer] lets you send messages to your doctor, view your test results, renew your prescriptions, schedule appointments and more. To sign up, go to [email or web address] . Click on \"Log in\" on the left side of the screen, which will take you to the Welcome page. Then click on \"Sign up Now\" on the right side of the page.     You will be asked to enter the access code listed below, as well as some personal information. Please follow the directions to create your username and password.     Your access " code is: [email or web address]  Expires: 2018  2:28 PM     Your access code will  in 90 days. If you need help or a new code, please call your [facility] clinic or [phone].        Care EveryWhere ID     This is your Care EveryWhere ID. This could be used by other organizations to access your [facility] medical records  [id number]        Your Vitals Were     Last Period BMI (Body Mass Index)                02/10/2017 (Exact Date) 32.85 kg/m2           Blood Pressure from Last 3 Encounters:   17 122/76   11/15/17 135/78   17 126/72    Weight from Last 3 Encounters:   17 185 lb 6.4 oz (84.1 kg)   17 202 lb (91.6 kg)   17 205 lb (93 kg)              Today, you had the following     No orders found for display       Primary Care Provider Fax #    Physician No Ref-Primary [phone]       No address on file        Equal Access to Services     LINDSAY RODRIGUEZ : Hadii fátima ku hadasho Soomaali, waaxda luqadaha, qaybta kaalmada adeegyada, waxay mari gresham . So [facility] [phone].    ATENCIÓN: Si habla español, tiene a golden disposición servicios gratuitos de asistencia lingüística. Llame al [phone].    We comply with applicable federal civil rights laws and [place] laws. We do not discriminate on the basis of race, color, national origin, age, disability, sex, sexual orientation, or gender identity.            Thank you!     Thank you for choosing [facility]  for your care. Our goal is always to provide you with excellent care. Hearing back from our patients is one way we can continue to improve our services. Please take a few minutes to complete the written survey that you may receive in the mail after your visit with us. Thank you!             Your Updated Medication List - Protect others around you: Learn how to safely use, store and throw away your medicines at www.disposemymeds.org.          This list is accurate as of: 17  3:48 PM.  Always  use your most recent med list.                   Brand Name Dispense Instructions for use Diagnosis    acetone (Urine) test Strp     100 each    Test once daily x1 week, then reduce to once weekly if consistently negative    Gestational diabetes       DACIA CONTOUR NEXT test strip   Generic drug:  blood glucose monitoring     300 strip    USE TO TEST BLOOD SUGAR FOUR TIMES DAILY OR AS DIRECTED    Gestational diabetes       blood glucose monitoring lancets     100 each    Use to test blood sugar 4 times daily or as directed.    Gestational diabetes       breast pump Misc     1 each    1 each daily        doxylamine 25 MG Tabs tablet    UNISOM     Take 25 mg by mouth At Bedtime        ibuprofen 600 MG tablet    ADVIL/MOTRIN    100 tablet    Take 1 tablet (600 mg) by mouth every 6 hours as needed for moderate pain    S/P primary low transverse        insulin isophane human 100 UNIT/ML injection    HumuLIN N PEN    30 mL    Inject up to 12 units before bed every day.    Insulin controlled gestational diabetes mellitus (GDM) in third trimester       insulin pen needle 32G X 4 MM    INSUPEN PEN NEEDLES    100 each    Use 1 pen needle daily or as directed.    Gestational diabetes       oxyCODONE IR 5 MG tablet    ROXICODONE    30 tablet    Take 1-2 tablets (5-10 mg) by mouth every 4 hours as needed for pain maximum 8 tablet(s) per day    S/P primary low transverse        prenatal multivitamin plus iron 27-0.8 MG Tabs per tablet      Take 1 tablet by mouth daily

## 2017-11-28 NOTE — IP AVS SNAPSHOT
[facility]    [address]    [facility] [address]    Phone:  [phone]    Fax:  [phone]                                       After Visit Summary   11/28/2017    Paulo Lema    MRN: [id number]           After Visit Summary Signature Page     I have received my discharge instructions, and my questions have been answered. I have discussed any challenges I see with this plan with the nurse or doctor.    ..........................................................................................................................................  Patient/Patient Representative Signature      ..........................................................................................................................................  Patient Representative Print Name and Relationship to Patient    ..................................................               ................................................  Date                                            Time    ..........................................................................................................................................  Reviewed by Signature/Title    ...................................................              ..............................................  Date                                                            Time

## 2017-11-29 ENCOUNTER — ANESTHESIA EVENT (OUTPATIENT)
Dept: SURGERY | Facility: CLINIC | Age: 27
End: 2017-11-29
Payer: COMMERCIAL

## 2017-11-29 ENCOUNTER — APPOINTMENT (OUTPATIENT)
Dept: SURGERY | Facility: PHYSICIAN GROUP | Age: 27
End: 2017-11-29
Payer: COMMERCIAL

## 2017-11-29 ENCOUNTER — ANESTHESIA (OUTPATIENT)
Dept: SURGERY | Facility: CLINIC | Age: 27
End: 2017-11-29
Payer: COMMERCIAL

## 2017-11-29 ENCOUNTER — APPOINTMENT (OUTPATIENT)
Dept: GENERAL RADIOLOGY | Facility: CLINIC | Age: 27
End: 2017-11-29
Attending: SURGERY
Payer: COMMERCIAL

## 2017-11-29 VITALS
HEIGHT: 64 IN | SYSTOLIC BLOOD PRESSURE: 146 MMHG | RESPIRATION RATE: 18 BRPM | BODY MASS INDEX: 30.73 KG/M2 | WEIGHT: 180 LBS | HEART RATE: 61 BPM | OXYGEN SATURATION: 97 % | DIASTOLIC BLOOD PRESSURE: 79 MMHG | TEMPERATURE: 98.5 F

## 2017-11-29 LAB
ALBUMIN SERPL-MCNC: 2.8 G/DL (ref 3.4–5)
ALP SERPL-CCNC: 132 U/L (ref 40–150)
ALT SERPL W P-5'-P-CCNC: 104 U/L (ref 0–50)
ANION GAP SERPL CALCULATED.3IONS-SCNC: 7 MMOL/L (ref 3–14)
AST SERPL W P-5'-P-CCNC: 81 U/L (ref 0–45)
BASOPHILS # BLD AUTO: 0.1 10E9/L (ref 0–0.2)
BASOPHILS NFR BLD AUTO: 1.2 %
BILIRUB DIRECT SERPL-MCNC: 0.1 MG/DL (ref 0–0.2)
BILIRUB SERPL-MCNC: 0.4 MG/DL (ref 0.2–1.3)
BUN SERPL-MCNC: 10 MG/DL (ref 7–30)
CALCIUM SERPL-MCNC: 7.9 MG/DL (ref 8.5–10.1)
CHLORIDE SERPL-SCNC: 109 MMOL/L (ref 94–109)
CO2 SERPL-SCNC: 26 MMOL/L (ref 20–32)
CREAT SERPL-MCNC: 0.85 MG/DL (ref 0.52–1.04)
DIFFERENTIAL METHOD BLD: NORMAL
EOSINOPHIL # BLD AUTO: 0.6 10E9/L (ref 0–0.7)
EOSINOPHIL NFR BLD AUTO: 9.9 %
ERYTHROCYTE [DISTWIDTH] IN BLOOD BY AUTOMATED COUNT: 11.9 % (ref 10–15)
GFR SERPL CREATININE-BSD FRML MDRD: 81 ML/MIN/1.7M2
GLUCOSE SERPL-MCNC: 82 MG/DL (ref 70–99)
HCT VFR BLD AUTO: 36.6 % (ref 35–47)
HGB BLD-MCNC: 11.9 G/DL (ref 11.7–15.7)
IMM GRANULOCYTES # BLD: 0 10E9/L (ref 0–0.4)
IMM GRANULOCYTES NFR BLD: 0.4 %
LIPASE SERPL-CCNC: 4291 U/L (ref 73–393)
LYMPHOCYTES # BLD AUTO: 1.8 10E9/L (ref 0.8–5.3)
LYMPHOCYTES NFR BLD AUTO: 32.4 %
MCH RBC QN AUTO: 30.1 PG (ref 26.5–33)
MCHC RBC AUTO-ENTMCNC: 32.5 G/DL (ref 31.5–36.5)
MCV RBC AUTO: 92 FL (ref 78–100)
MONOCYTES # BLD AUTO: 0.4 10E9/L (ref 0–1.3)
MONOCYTES NFR BLD AUTO: 7.7 %
NEUTROPHILS # BLD AUTO: 2.8 10E9/L (ref 1.6–8.3)
NEUTROPHILS NFR BLD AUTO: 48.4 %
NRBC # BLD AUTO: 0 10*3/UL
NRBC BLD AUTO-RTO: 0 /100
PLATELET # BLD AUTO: 370 10E9/L (ref 150–450)
POTASSIUM SERPL-SCNC: 3.6 MMOL/L (ref 3.4–5.3)
PROT SERPL-MCNC: 6.3 G/DL (ref 6.8–8.8)
RBC # BLD AUTO: 3.96 10E12/L (ref 3.8–5.2)
SODIUM SERPL-SCNC: 142 MMOL/L (ref 133–144)
WBC # BLD AUTO: 5.7 10E9/L (ref 4–11)

## 2017-11-29 PROCEDURE — 36000058 ZZH SURGERY LEVEL 3 EA 15 ADDTL MIN: Performed by: SURGERY

## 2017-11-29 PROCEDURE — 25000128 H RX IP 250 OP 636: Performed by: PHYSICIAN ASSISTANT

## 2017-11-29 PROCEDURE — 36415 COLL VENOUS BLD VENIPUNCTURE: CPT | Performed by: PHYSICIAN ASSISTANT

## 2017-11-29 PROCEDURE — 85025 COMPLETE CBC W/AUTO DIFF WBC: CPT | Performed by: PHYSICIAN ASSISTANT

## 2017-11-29 PROCEDURE — 0FT44ZZ RESECTION OF GALLBLADDER, PERCUTANEOUS ENDOSCOPIC APPROACH: ICD-10-PCS | Performed by: SURGERY

## 2017-11-29 PROCEDURE — 71000012 ZZH RECOVERY PHASE 1 LEVEL 1 FIRST HR: Performed by: SURGERY

## 2017-11-29 PROCEDURE — 88304 TISSUE EXAM BY PATHOLOGIST: CPT | Performed by: SURGERY

## 2017-11-29 PROCEDURE — 25000128 H RX IP 250 OP 636: Performed by: SURGERY

## 2017-11-29 PROCEDURE — 47563 LAPARO CHOLECYSTECTOMY/GRAPH: CPT | Mod: AS | Performed by: PHYSICIAN ASSISTANT

## 2017-11-29 PROCEDURE — 25000128 H RX IP 250 OP 636: Performed by: ANESTHESIOLOGY

## 2017-11-29 PROCEDURE — 36000060 ZZH SURGERY LEVEL 3 W FLUORO 1ST 30 MIN: Performed by: SURGERY

## 2017-11-29 PROCEDURE — 25000128 H RX IP 250 OP 636: Performed by: NURSE ANESTHETIST, CERTIFIED REGISTERED

## 2017-11-29 PROCEDURE — 37000009 ZZH ANESTHESIA TECHNICAL FEE, EACH ADDTL 15 MIN: Performed by: SURGERY

## 2017-11-29 PROCEDURE — 40000277 XR SURGERY CARM FLUORO LESS THAN 5 MIN W STILLS

## 2017-11-29 PROCEDURE — 80048 BASIC METABOLIC PNL TOTAL CA: CPT | Performed by: PHYSICIAN ASSISTANT

## 2017-11-29 PROCEDURE — 27210995 ZZH RX 272: Performed by: SURGERY

## 2017-11-29 PROCEDURE — 25000125 ZZHC RX 250: Performed by: SURGERY

## 2017-11-29 PROCEDURE — 37000008 ZZH ANESTHESIA TECHNICAL FEE, 1ST 30 MIN: Performed by: SURGERY

## 2017-11-29 PROCEDURE — 25000125 ZZHC RX 250: Performed by: NURSE ANESTHETIST, CERTIFIED REGISTERED

## 2017-11-29 PROCEDURE — 80076 HEPATIC FUNCTION PANEL: CPT | Performed by: PHYSICIAN ASSISTANT

## 2017-11-29 PROCEDURE — 40000305 ZZH STATISTIC PRE PROC ASSESS I: Performed by: SURGERY

## 2017-11-29 PROCEDURE — 99213 OFFICE O/P EST LOW 20 MIN: CPT | Mod: 57 | Performed by: SURGERY

## 2017-11-29 PROCEDURE — 27210794 ZZH OR GENERAL SUPPLY STERILE: Performed by: SURGERY

## 2017-11-29 PROCEDURE — 71000013 ZZH RECOVERY PHASE 1 LEVEL 1 EA ADDTL HR: Performed by: SURGERY

## 2017-11-29 PROCEDURE — 88304 TISSUE EXAM BY PATHOLOGIST: CPT | Mod: 26 | Performed by: SURGERY

## 2017-11-29 PROCEDURE — S0020 INJECTION, BUPIVICAINE HYDRO: HCPCS | Performed by: SURGERY

## 2017-11-29 PROCEDURE — 25000566 ZZH SEVOFLURANE, EA 15 MIN: Performed by: SURGERY

## 2017-11-29 PROCEDURE — 83690 ASSAY OF LIPASE: CPT | Performed by: PHYSICIAN ASSISTANT

## 2017-11-29 PROCEDURE — 47563 LAPARO CHOLECYSTECTOMY/GRAPH: CPT | Performed by: SURGERY

## 2017-11-29 RX ORDER — ALBUTEROL SULFATE 0.83 MG/ML
2.5 SOLUTION RESPIRATORY (INHALATION) EVERY 4 HOURS PRN
Status: DISCONTINUED | OUTPATIENT
Start: 2017-11-29 | End: 2017-11-29 | Stop reason: HOSPADM

## 2017-11-29 RX ORDER — ACETAMINOPHEN 10 MG/ML
1000 INJECTION, SOLUTION INTRAVENOUS
Status: DISCONTINUED | OUTPATIENT
Start: 2017-11-29 | End: 2017-11-29 | Stop reason: HOSPADM

## 2017-11-29 RX ORDER — FENTANYL CITRATE 50 UG/ML
INJECTION, SOLUTION INTRAMUSCULAR; INTRAVENOUS PRN
Status: DISCONTINUED | OUTPATIENT
Start: 2017-11-29 | End: 2017-11-29

## 2017-11-29 RX ORDER — SODIUM CHLORIDE, SODIUM LACTATE, POTASSIUM CHLORIDE, CALCIUM CHLORIDE 600; 310; 30; 20 MG/100ML; MG/100ML; MG/100ML; MG/100ML
INJECTION, SOLUTION INTRAVENOUS CONTINUOUS
Status: DISCONTINUED | OUTPATIENT
Start: 2017-11-29 | End: 2017-11-29 | Stop reason: HOSPADM

## 2017-11-29 RX ORDER — BUPIVACAINE HYDROCHLORIDE 2.5 MG/ML
INJECTION, SOLUTION EPIDURAL; INFILTRATION; INTRACAUDAL PRN
Status: DISCONTINUED | OUTPATIENT
Start: 2017-11-29 | End: 2017-11-29 | Stop reason: HOSPADM

## 2017-11-29 RX ORDER — CEFAZOLIN SODIUM 2 G/100ML
2 INJECTION, SOLUTION INTRAVENOUS
Status: COMPLETED | OUTPATIENT
Start: 2017-11-29 | End: 2017-11-29

## 2017-11-29 RX ORDER — LIDOCAINE HYDROCHLORIDE 10 MG/ML
INJECTION, SOLUTION INFILTRATION; PERINEURAL PRN
Status: DISCONTINUED | OUTPATIENT
Start: 2017-11-29 | End: 2017-11-29

## 2017-11-29 RX ORDER — ONDANSETRON 4 MG/1
4 TABLET, ORALLY DISINTEGRATING ORAL EVERY 30 MIN PRN
Status: DISCONTINUED | OUTPATIENT
Start: 2017-11-29 | End: 2017-11-29 | Stop reason: HOSPADM

## 2017-11-29 RX ORDER — ONDANSETRON 2 MG/ML
INJECTION INTRAMUSCULAR; INTRAVENOUS PRN
Status: DISCONTINUED | OUTPATIENT
Start: 2017-11-29 | End: 2017-11-29

## 2017-11-29 RX ORDER — DEXAMETHASONE SODIUM PHOSPHATE 4 MG/ML
INJECTION, SOLUTION INTRA-ARTICULAR; INTRALESIONAL; INTRAMUSCULAR; INTRAVENOUS; SOFT TISSUE PRN
Status: DISCONTINUED | OUTPATIENT
Start: 2017-11-29 | End: 2017-11-29

## 2017-11-29 RX ORDER — NEOSTIGMINE METHYLSULFATE 1 MG/ML
VIAL (ML) INJECTION PRN
Status: DISCONTINUED | OUTPATIENT
Start: 2017-11-29 | End: 2017-11-29

## 2017-11-29 RX ORDER — IBUPROFEN 600 MG/1
600 TABLET, FILM COATED ORAL
Status: DISCONTINUED | OUTPATIENT
Start: 2017-11-29 | End: 2017-11-29 | Stop reason: HOSPADM

## 2017-11-29 RX ORDER — PROPOFOL 10 MG/ML
INJECTION, EMULSION INTRAVENOUS PRN
Status: DISCONTINUED | OUTPATIENT
Start: 2017-11-29 | End: 2017-11-29

## 2017-11-29 RX ORDER — DIMENHYDRINATE 50 MG/ML
25 INJECTION, SOLUTION INTRAMUSCULAR; INTRAVENOUS
Status: DISCONTINUED | OUTPATIENT
Start: 2017-11-29 | End: 2017-11-29 | Stop reason: HOSPADM

## 2017-11-29 RX ORDER — ONDANSETRON 4 MG/1
4-8 TABLET, ORALLY DISINTEGRATING ORAL EVERY 8 HOURS PRN
Qty: 4 TABLET | Refills: 0 | Status: SHIPPED | OUTPATIENT
Start: 2017-11-29 | End: 2018-01-03

## 2017-11-29 RX ORDER — HYDROMORPHONE HYDROCHLORIDE 1 MG/ML
.3-.5 INJECTION, SOLUTION INTRAMUSCULAR; INTRAVENOUS; SUBCUTANEOUS EVERY 5 MIN PRN
Status: DISCONTINUED | OUTPATIENT
Start: 2017-11-29 | End: 2017-11-29 | Stop reason: HOSPADM

## 2017-11-29 RX ORDER — GLYCOPYRROLATE 0.2 MG/ML
INJECTION, SOLUTION INTRAMUSCULAR; INTRAVENOUS PRN
Status: DISCONTINUED | OUTPATIENT
Start: 2017-11-29 | End: 2017-11-29

## 2017-11-29 RX ORDER — OXYCODONE HYDROCHLORIDE 5 MG/1
5 TABLET ORAL
Status: DISCONTINUED | OUTPATIENT
Start: 2017-11-29 | End: 2017-11-29 | Stop reason: HOSPADM

## 2017-11-29 RX ORDER — ONDANSETRON 2 MG/ML
4 INJECTION INTRAMUSCULAR; INTRAVENOUS EVERY 30 MIN PRN
Status: DISCONTINUED | OUTPATIENT
Start: 2017-11-29 | End: 2017-11-29 | Stop reason: HOSPADM

## 2017-11-29 RX ORDER — OXYCODONE HYDROCHLORIDE 5 MG/1
5-10 TABLET ORAL EVERY 4 HOURS PRN
Qty: 20 TABLET | Refills: 0 | Status: SHIPPED | OUTPATIENT
Start: 2017-11-29 | End: 2018-01-03

## 2017-11-29 RX ORDER — LIDOCAINE 40 MG/G
CREAM TOPICAL
Status: DISCONTINUED | OUTPATIENT
Start: 2017-11-29 | End: 2017-11-29 | Stop reason: HOSPADM

## 2017-11-29 RX ORDER — LABETALOL HYDROCHLORIDE 5 MG/ML
10 INJECTION, SOLUTION INTRAVENOUS
Status: DISCONTINUED | OUTPATIENT
Start: 2017-11-29 | End: 2017-11-29 | Stop reason: HOSPADM

## 2017-11-29 RX ORDER — CEFAZOLIN 1 G/1
2 INJECTION, POWDER, FOR SOLUTION INTRAVENOUS
Status: DISCONTINUED | OUTPATIENT
Start: 2017-11-29 | End: 2017-11-29

## 2017-11-29 RX ORDER — DROPERIDOL 2.5 MG/ML
0.62 INJECTION, SOLUTION INTRAMUSCULAR; INTRAVENOUS
Status: DISCONTINUED | OUTPATIENT
Start: 2017-11-29 | End: 2017-11-29 | Stop reason: HOSPADM

## 2017-11-29 RX ORDER — FENTANYL CITRATE 50 UG/ML
25-50 INJECTION, SOLUTION INTRAMUSCULAR; INTRAVENOUS
Status: DISCONTINUED | OUTPATIENT
Start: 2017-11-29 | End: 2017-11-29 | Stop reason: HOSPADM

## 2017-11-29 RX ORDER — MEPERIDINE HYDROCHLORIDE 25 MG/ML
12.5 INJECTION INTRAMUSCULAR; INTRAVENOUS; SUBCUTANEOUS EVERY 5 MIN PRN
Status: DISCONTINUED | OUTPATIENT
Start: 2017-11-29 | End: 2017-11-29 | Stop reason: HOSPADM

## 2017-11-29 RX ORDER — KETOROLAC TROMETHAMINE 30 MG/ML
INJECTION, SOLUTION INTRAMUSCULAR; INTRAVENOUS PRN
Status: DISCONTINUED | OUTPATIENT
Start: 2017-11-29 | End: 2017-11-29

## 2017-11-29 RX ADMIN — SODIUM CHLORIDE: 9 INJECTION, SOLUTION INTRAVENOUS at 17:34

## 2017-11-29 RX ADMIN — GLYCOPYRROLATE 0.4 MG: 0.2 INJECTION, SOLUTION INTRAMUSCULAR; INTRAVENOUS at 14:53

## 2017-11-29 RX ADMIN — SODIUM CHLORIDE, POTASSIUM CHLORIDE, SODIUM LACTATE AND CALCIUM CHLORIDE: 600; 310; 30; 20 INJECTION, SOLUTION INTRAVENOUS at 13:43

## 2017-11-29 RX ADMIN — GLUCAGON HYDROCHLORIDE 1 MG: 1 INJECTION, POWDER, FOR SOLUTION INTRAMUSCULAR; INTRAVENOUS; SUBCUTANEOUS at 14:33

## 2017-11-29 RX ADMIN — MIDAZOLAM HYDROCHLORIDE 2 MG: 1 INJECTION, SOLUTION INTRAMUSCULAR; INTRAVENOUS at 14:02

## 2017-11-29 RX ADMIN — KETOROLAC TROMETHAMINE 30 MG: 30 INJECTION, SOLUTION INTRAMUSCULAR at 14:53

## 2017-11-29 RX ADMIN — Medication 3 MG: at 14:53

## 2017-11-29 RX ADMIN — LIDOCAINE HYDROCHLORIDE 50 MG: 10 INJECTION, SOLUTION INFILTRATION; PERINEURAL at 14:07

## 2017-11-29 RX ADMIN — Medication 0.5 MG: at 16:30

## 2017-11-29 RX ADMIN — SODIUM CHLORIDE: 9 INJECTION, SOLUTION INTRAVENOUS at 08:21

## 2017-11-29 RX ADMIN — SODIUM CHLORIDE, POTASSIUM CHLORIDE, SODIUM LACTATE AND CALCIUM CHLORIDE: 600; 310; 30; 20 INJECTION, SOLUTION INTRAVENOUS at 14:53

## 2017-11-29 RX ADMIN — GLYCOPYRROLATE 0.2 MG: 0.2 INJECTION, SOLUTION INTRAMUSCULAR; INTRAVENOUS at 14:07

## 2017-11-29 RX ADMIN — FENTANYL CITRATE 50 MCG: 50 INJECTION, SOLUTION INTRAMUSCULAR; INTRAVENOUS at 16:44

## 2017-11-29 RX ADMIN — FENTANYL CITRATE 100 MCG: 50 INJECTION, SOLUTION INTRAMUSCULAR; INTRAVENOUS at 14:07

## 2017-11-29 RX ADMIN — ROCURONIUM BROMIDE 40 MG: 10 INJECTION INTRAVENOUS at 14:07

## 2017-11-29 RX ADMIN — DEXAMETHASONE SODIUM PHOSPHATE 4 MG: 4 INJECTION, SOLUTION INTRA-ARTICULAR; INTRALESIONAL; INTRAMUSCULAR; INTRAVENOUS; SOFT TISSUE at 14:07

## 2017-11-29 RX ADMIN — PROPOFOL 200 MG: 10 INJECTION, EMULSION INTRAVENOUS at 14:07

## 2017-11-29 RX ADMIN — HYDROMORPHONE HYDROCHLORIDE 1 MG: 1 INJECTION, SOLUTION INTRAMUSCULAR; INTRAVENOUS; SUBCUTANEOUS at 14:17

## 2017-11-29 RX ADMIN — CEFAZOLIN SODIUM 2 G: 2 INJECTION, SOLUTION INTRAVENOUS at 14:02

## 2017-11-29 RX ADMIN — ONDANSETRON 4 MG: 2 INJECTION INTRAMUSCULAR; INTRAVENOUS at 14:53

## 2017-11-29 NOTE — ANESTHESIA POSTPROCEDURE EVALUATION
Patient: Paulo Lema    Procedure(s):  LAPAROSCOPIC CHOLECYSTECTOMY WITH CHOLANGIOGRAMS  - Wound Class: II-Clean Contaminated    Diagnosis:cholelithasis   Diagnosis Additional Information: Pre-operative diagnosis: gallstone pancreatitis  Post-operative diagnosis: same   Procedure: laparoscopic cholecystectomy  intraoperative cholangiogram          Anesthesia Type:  General, ETT    Note:  Anesthesia Post Evaluation    Patient location during evaluation: PACU  Patient participation: Able to fully participate in evaluation  Level of consciousness: awake  Pain management: adequate  Airway patency: patent  Cardiovascular status: acceptable  Respiratory status: acceptable  Hydration status: euvolemic  PONV: controlled     Anesthetic complications: None          Last vitals:  Vitals:    11/29/17 1645 11/29/17 1700 11/29/17 1715   BP: (!) 132/110 132/75 135/78   Pulse:      Resp: 14 16 18   Temp:  97.6  F (36.4  C)    SpO2: 98% 96% 95%         Electronically Signed By: Niko Mills MD  November 29, 2017  5:21 PM

## 2017-11-29 NOTE — ANESTHESIA PREPROCEDURE EVALUATION
Anesthesia Evaluation     . Pt has had prior anesthetic. Type: General and Regional    No history of anesthetic complications          ROS/MED HX    ENT/Pulmonary:  - neg pulmonary ROS     Neurologic:  - neg neurologic ROS     Cardiovascular:  - neg cardiovascular ROS       METS/Exercise Tolerance:     Hematologic:  - neg hematologic  ROS       Musculoskeletal:  - neg musculoskeletal ROS       GI/Hepatic:     (+) cholecystitis/cholelithiasis,       Renal/Genitourinary:  - ROS Renal section negative       Endo: Comment: Gestational DM appears resolved two weeks post delivery  Class 1 obesity    (+) Obesity, .      Psychiatric:  - neg psychiatric ROS       Infectious Disease:  - neg infectious disease ROS       Malignancy:      - no malignancy   Other:    - neg other ROS                 Physical Exam  Normal systems: cardiovascular, pulmonary and dental    Airway   Mallampati: II  TM distance: >3 FB  Neck ROM: full    Dental     Cardiovascular   Rhythm and rate: regular and normal      Pulmonary    breath sounds clear to auscultation    Other findings: Lab Test        11/29/17 11/28/17 11/13/17 11/12/17                       0645          1909          0618          2245          WBC          5.7          10.8          --          25.4*         HGB          11.9         12.4         11.3*        13.3          MCV          92           91            --          91            PLT          370          393           --          223            Lab Test        11/29/17 11/28/17 11/13/17 11/12/17      --          05/21/17                       0645          1909          0618          2245           --           2240          NA           142          140           --           --           --          137           POTASSIUM    3.6          3.4           --           --           --          3.4           CHLORIDE     109          107           --           --           --          104            CO2          26           26            --           --           --          26            BUN          10           11            --           --           --          6*            CR           0.85         0.77          --          1.64*         --          0.52          ANIONGAP     7            7             --           --           --          7             JUSTICE          7.9*         8.6           --           --           --          8.9           GLC          82           103*         116*         97             < >        94             < > = values in this interval not displayed.                                        Anesthesia Plan      History & Physical Review  History and physical reviewed and following examination; no interval change.    ASA Status:  2 .    NPO Status:  > 8 hours    Plan for General and ETT with Intravenous induction. Maintenance will be Balanced.    PONV prophylaxis:  Ondansetron (or other 5HT-3) and Dexamethasone or Solumedrol       Postoperative Care  Postoperative pain management:  IV analgesics, Oral pain medications and Multi-modal analgesia.      Consents  Anesthetic plan, risks, benefits and alternatives discussed with:  Patient.  Use of blood products discussed: No .   .                          .

## 2017-11-29 NOTE — OP NOTE
General Surgery Operative Note      Pre-operative diagnosis: gallstone pancreatitis   Post-operative diagnosis: same    Procedure: laparoscopic cholecystectomy  intraoperative cholangiogram     Surgeon: Mary Conroy MD   Assistant(s): Vini Carney PA-C  The Physician Assistant was medically necessary for their expertise in prepping, camera management, suctioning, suturing and retraction.   Anesthesia: general    Estimated blood loss:   5 cc     Specimens: gallbladder and contents     DESCRIPTION OF PROCEDURE: The patient was taken to the operating room and placed on the table in supine position. General endotracheal anesthesia was induced and the abdomen was prepped and draped in standard sterile fashion. An incision was made just below the umbilicus with a blade. The incision was carried down to the fascia. The fascia was incised in the midline with a blade. The peritoneum was entered bluntly with a Carmalt clamp. Two interrupted 0 Vicryl sutures were placed at the extremes of this fascial incision. The Carmen trocar was introduced and the abdomen was insufflated with CO2. A 5 mm trocar was placed in the subxiphoid position. A 5 mm trocar was placed in the right upper quadrant, just below the costal margin at the midclavicular line. Another was placed at the anterior axillary line just below the costal margin on the right. The patient was placed in reverse Trendelenburg and right side up. The gallbladder appeared normal. The fundus of the gallbladder was grasped and retracted cephalad. The infundibulum was grasped and retracted laterally. The peritoneum over the medial and lateral aspects of the triangle of Calot was taken down with the [place] dissector.  The triangle of Calot was skeletonized, thus obtaining the critical view of safety.  The infundibulum of the gallbladder was grasped with the Ma clamp. Thereby the cholangiogram catheter was introduced and used to canulate the infundibulum of the  gallbladder. The cholangiogram revealed a biliary tree without filling defects. The radiologist confirmed these findings. The cholangiogram catheter was removed from the abdomen. The duct was thrice clipped and then transected, leaving two clips on the cystic duct stump. The cystic artery was freed up from surrounding tissues. It was clipped twice proximally, once distally and transected with the hook scissors. A thorough evaluation of the triangle of Calot revealed no aberrant ducts or vessels. The gallbladder was then removed from the liver using the hook electrocautery. The gallbladder was passed into an Endocatch bag and removed through the umbilical trocar site. We observed the right upper quadrant carefully for hemostasis. Hemostasis was assured. We irrigated with copious amounts of sterile saline and aspirated the effluent. The right upper quadrant trocar sites were anesthetized with local anesthetic. Each of the trocars was removed under direct visualization. There was no bleeding from any of these sites.  The Carmen trocar was removed and the abdomen was evacuated of CO2.  One additional interrupted 0 Vicryl suture was placed in the umbilical trocar site fascia.  Each of the three 0 Vicryl sutures was cinched down and tied. The skin of the umbilical incision was anesthetized with local anesthetic.  All of the incisions were closed with interrupted 4-0 Vicryl subcuticular sutures and Steri-Strips.  The patient tolerated the procedure well.  Sponge and instrument counts were correct.   Mary Conroy MD

## 2017-11-29 NOTE — ANESTHESIA CARE TRANSFER NOTE
Patient: Paulo Lema    Procedure(s):  LAPAROSCOPIC CHOLECYSTECTOMY WITH CHOLANGIOGRAMS  - Wound Class: II-Clean Contaminated    Diagnosis: cholelithasis   Diagnosis Additional Information: No value filed.    Anesthesia Type:   General, ETT     Note:  Airway :Face Mask  Patient transferred to:PACU  Comments: Ceasar Report: Identifed the Patient, Identified the Reponsible Provider, Reviewed the pertinent medical history, Discussed the surgical course, Reviewed Intra-OP anesthesia mangement and issues during anesthesia, Set expectations for post-procedure period and Allowed opportunity for questions and acknowledgement of understanding      Vitals: (Last set prior to Anesthesia Care Transfer)    CRNA VITALS  11/29/2017 1431 - 11/29/2017 1506      11/29/2017             Pulse: 109    SpO2: 100 %    Resp Rate (observed): (!)  33                Electronically Signed By: REID Chavez CRNA  November 29, 2017  3:06 PM

## 2017-11-29 NOTE — ED NOTES
Pt c/o upper abdominal pain for for past 2 days and also on Saturday. Pt had  2 weeks ago. Pt sent home with Oxycodone and ibuprofen. Pt states pain last for about an hour each day until pain medication started working. Last took oxycodone and ibuprofen around 1700. ABCs intact and A&Ox4.

## 2017-11-29 NOTE — OR NURSING
Pt declines UPT test as she is 2 weeks post delivery and denies intercourse since delivery.  MDA aware.

## 2017-11-29 NOTE — PROGRESS NOTES
Discussed case with Dr. Conroy post op    Doing well, no pain, tolerating regular diet.  Patient wants to discharge home therefore discharged from PACU on oral meds with appropriate instructions to return if symptoms recur.     Not seen by me prior to d/c

## 2017-11-29 NOTE — PLAN OF CARE
Problem: Patient Care Overview  Goal: Plan of Care/Patient Progress Review  Outcome: Therapy, progress toward functional goals as expected  Orientation: Alert and oriented x4  VSS. 97% on RA. Afebrile.   LS: clear and equal bilaterally.   GI: Passing gas. no BM. Denies N/V. NPO since midnight  : Adequate urine output. Clear yellow.  Skin: well healing  incision. Skin otherwise intact.   Activity: Independent. Up in room throughout night. Pt slept comfortably throughout shift.   Pain: 0/10. Denies pain throughout shift. PRN interventions offered. Pt declines.   Plan: Continue with current cares.      and 2 week old daughter in room with pt throughout night. Pt pumping. Pt education on importance of pumping and dumping if using narcotics. Pt verbalized understanding

## 2017-11-29 NOTE — ED NOTES
[facility]  ED Nurse Handoff Report    Paulo Lema is a 27 year old female   ED Chief complaint: Abdominal Pain  . ED Diagnosis:   Final diagnoses:   Calculus of gallbladder without cholecystitis without obstruction   Acute biliary pancreatitis without infection or necrosis     Allergies:   Allergies   Allergen Reactions     Reglan [Metoclopramide] Other (See Comments)     Light headed       Code Status: Full Code  Activity level - Baseline/Home:  Independent. Activity Level - Current:   Stand with Assist. Lift room needed: No. Bariatric: No   Needed: No   Isolation: No. Infection: Not Applicable.     Vital Signs:   Vitals:    11/28/17 1900 11/28/17 1910 11/28/17 2056 11/28/17 2058   BP:   (!) 158/100    Resp:       Temp:       TempSrc:       SpO2: 98% 97%  98%       Cardiac Rhythm:  ,      Pain level: 0-10 Pain Scale: 1  Patient confused: No. Patient Falls Risk: Yes.   Elimination Status: Has voided   Patient Report - Initial Complaint: Abdominal Pain. Focused Assessment:  Gastrointestinal - GI WDL: GI symptoms All Quadrants Bowel Sounds: audible and normoactive All Quadrants Palpation: tender GI Signs/Symptoms: abdominal pain Gastrointestinal Comment: pt c/o pain across upper abdomen for 3 days. pt states had episode of vomiting yesterday.  Tests Performed: Labs, US Abdomen. Abnormal Results:   Labs Ordered and Resulted from Time of ED Arrival Up to the Time of Departure from the ED   COMPREHENSIVE METABOLIC PANEL - Abnormal; Notable for the following:        Result Value    Glucose 103 (*)     Albumin 2.9 (*)     Protein Total 6.5 (*)     ALT 77 (*)      (*)     All other components within normal limits   LIPASE - Abnormal; Notable for the following:     Lipase 5705 (*)     All other components within normal limits   CBC WITH PLATELETS DIFFERENTIAL   PULSE OXIMETRY NURSING   PERIPHERAL IV CATHETER      Treatments provided: See MAR. Patient took oxycodone at home PTA.  Family  Comments: Present  OBS brochure/video discussed/provided to patient:  N/A  ED Medications: Medications - No data to display  Drips infusing:  No  For the majority of the shift, the patient's behavior Green. Interventions performed were N/A     Severe Sepsis OR Septic Shock Diagnosis Present: No      ED Nurse Name/Phone Number: Palma Howe,   9:17 PM

## 2017-11-29 NOTE — ED PROVIDER NOTES
"  History     Chief Complaint:  Abdominal Pain    HPI   Paulo Lema is a diabetic 27 year old female who presents with abdominal pain 2 weeks post operative . The patient states that she had an onset of sharp abdominal pain in addition to a \"blotchy red\" chest first on . She had taken 2 Oxycodone which had relieved her pain, but had 4 episodes of emesis. This morning the patient had a surgical follow up appointment with her OB GYN, Dr. Tsang, which was overall non remarkable appointment, but she had not mentioned her previous episode of abdominal pain. Today, at approximately 1645, the patient states that she had another onset of her symptoms. She had taken 1 Oxycodone prior to arrival  with only 1 episode of emesis. Here, the patient describes her pain as sharp bilateral upper quadrant pain that radiates into her back. Since she took 1 Oxycodone prior to arrival, she notes her pain is subtle, but does have nausea. She also notes a low grade fever of 99.4 at 1700 today. She denies any changes in bowel movements or urinary symptoms.      Allergies:  Reglan [Metoclopramide]      Medications:    Oxycodone  Humulin  Unisom    Past Medical History:    Diabetes    Past Surgical History:     Section     Family History:    No past pertinent family history.     Social History:  Presents with her mother and .   Negative for alcohol use.  Negative for tobacco use.   Marital Status:   [2]     Review of Systems   Gastrointestinal: Positive for abdominal pain, nausea and vomiting.   All other systems reviewed and are negative.      Physical Exam   First Vitals:  BP: 151/76  Heart Rate: 89  Temp: 98.3  F (36.8  C)  Resp: 16  SpO2: 100 %    Physical Exam  General: Laying comfortable in bed  HEENT:   The scalp and head appear normal    The pupils are equal, round, and reactive to light    Extraocular muscles are intact.    The nose is normal.    The oropharynx is normal.      Uvula is in the " midline.  There is no peritonsillar abscess.  Neck:  Normal range of motion.    Lungs:  Clear.      No rales, no wheezing.      There is no tachypnea.  Non-labored.  Cardiac: Regular rate.      Normal S1 and S2.      No S3 or S4.      No pathological murmur.      No pericardial rub.  Abdomen: Soft. No distension. No tympani. No rebound.     Tender in right upper quadrant     Slightly tender in epigastric midline  Lymph: No anterior or posterior cervical lymphadenopathy noted.  MS:  Normal tone.      Normal movement of all extremities.    Neuro:  Normal mentation.  No focal motor or sensory changes.      Speech normal.  Psych:  Awake.     Alert.      Normal affect.      Appropriate interactions.  Skin:  No rash.      No lesions.    Scar low transverse with steri strips. Looks healthy.    Emergency Department Course   Imaging:  Radiographic findings were communicated with the patient who voiced understanding of the findings.  US Abdomen, Limited:  Cholelithiasis. As per radiology.     Laboratory:  CBC: WBC: 10.8, HGB: 12.4, PLT: 393  CMP: Glucose 103(H), Albumin: 2.9(L), Protein Total: 6.5(L), ALT: 77(H), AST: 122(H), o/w WNL (Creatinine: 0.77)    Lipase:5705(H)    Emergency Department Course:  Nursing notes and vitals reviewed. I performed an exam of the patient as documented above.     Blood drawn. This was sent to the lab for further testing, results above.    The patient was sent for an abdominal US while in the emergency department, findings above.     2030 I rechecked the patient and discussed the results of her workup thus far.     2110  I consulted with Dr. Huerta of the hospitalist services. They are in agreement to accept the patient for admission.    Findings and plan explained to the Patient who consents to admission. Discussed the patient with Dr. Huerta, who will admit the patient to a medical bed for further monitoring, evaluation, and treatment.       Impression & Plan      Medical Decision  Making:  Paulo Lema is a 27 year old female who is status post  2 weeks ago. She has been having intermittent abdominal pain and has evidence of biliary colic with cholelithiasis possibly choledocholithiasis with pancreatitis.    Currently she is pain free, she is non toxic appearing, but she has elevated lipase, AST, ALT. Her white blood cell count is normal and she is afebrile and hemodynamically stable    I am going to order an MRCP and admit her to the medical bed here and speak with the hospitalist. Depending on the MRCP, she may need ERCP.  Conversely, as discussed with MOHAN Ulrich, expectant management with recheck of lipase in the morning and referral urgently to surgery.    Diagnosis:    ICD-10-CM   1. Calculus of gallbladder without cholecystitis without obstruction K80.20   2. Acute biliary pancreatitis without infection or necrosis K85.10       Disposition:  Admitted to Dr. Bradley KNIGHT, Dolly Lewis, am serving as a scribe on 2017 at 6:44 PM to personally document services performed by Isaiah Nichols MD based on my observations and the provider's statements to me.      Dolly Lewis  2017   [facility] EMERGENCY DEPARTMENT       Isaiah Nichols MD  [phone]

## 2017-11-29 NOTE — CONSULTS
General Surgery Consultation    Paulo Lema MRN# [id number]   Age: 27 year old YOB: 1990     Date of Admission:  11/28/2017    Reason for consult:            Abdominal pain, epigastric       Requesting physician:            ROSETTE Huerta PA-C                Assessment and Plan:   Assessment:   Gallstone pancreatitis.  Mildly elevated Lipase and LFTs, normal tbil.  Pain and ttp resolved, therefore she may have passed a common duct stone.      Plan:   I have offered the patient a laparoscopic cholecystectomy with intraoperative cholangiogram.      We have discussed the indication, alternatives, risks and expected recovery.  Specifically we have discussed incisions, scarring, anesthesia, postoperative infections, bleeding, blood transfusion, open conversion, common bile duct injury, injury to intra-abdominal organs, adhesions leading to bowel obstruction, retained common bile duct stone, bile leak, DVT, PE, hernia, post cholecystectomy diarrhea, recovery, postoperative dietary restrictions and physical limitations. We have discussed the recommended interventions and treatments for each of these potential complications.  All questions have been answered to the best of my ability.    She agrees to proceed with surgery.  She understands this will not improve her pancreatitis, but it will prevent another episode.            Chief Complaint:   Abdominal pain, epigastric     History is obtained from the patient.         History of Present Illness:   This patient is a 27 year old  female who is is 2 weeks s/p C/S who presents with epigastric region abdominal pain radiating through to the back for the past 5 days.  The pain is intermittent.  She has had three episodes.  She has not had similar pain in the past.  There is not an association with eating any type of food.  Positive for associated symptoms of nausea and vomiting.  She  does not have a history of jaundice or dark urine.  She  has not had  "pancreatitis in the past.              Past Medical History:    has a past medical history of Diabetes (H).          Past Surgical History:     Past Surgical History:   Procedure Laterality Date      SECTION N/A 2017    Procedure:  SECTION;   section;  Surgeon: Blanca Villarreal MD;  Location: [facility]             Social History:     Social History   Substance Use Topics     Smoking status: Never Smoker     Smokeless tobacco: Never Used     Alcohol use No             Family History:   Gallbladder disease:  No  Family history reviewed and otherwise not pertinent.       Allergies:   All allergies reviewed and addressed          Medications:     No current facility-administered medications on file prior to encounter.   Current Outpatient Prescriptions on File Prior to Encounter:  oxyCODONE IR (ROXICODONE) 5 MG tablet Take 1-2 tablets (5-10 mg) by mouth every 4 hours as needed for pain maximum 8 tablet(s) per day   ibuprofen (ADVIL/MOTRIN) 600 MG tablet Take 1 tablet (600 mg) by mouth every 6 hours as needed for moderate pain   Prenatal Vit-Fe Fumarate-FA (PRENATAL MULTIVITAMIN  PLUS IRON) 27-0.8 MG TABS per tablet Take 1 tablet by mouth daily   Misc. Devices (BREAST PUMP) MISC 1 each daily   insulin pen needle (INSUPEN PEN NEEDLES) 32G X 4 MM Use 1 pen needle daily or as directed.   blood glucose monitoring (DACIA MICROLET) lancets Use to test blood sugar 4 times daily or as directed.   acetone, Urine, test STRP Test once daily x1 week, then reduce to once weekly if consistently negative   DACIA CONTOUR NEXT test strip USE TO TEST BLOOD SUGAR FOUR TIMES DAILY OR AS DIRECTED               Review of Systems:   The 10 point review of systems is negative other than noted in the HPI.          Physical Exam:   /64  Temp 98.4  F (36.9  C) (Oral)  Resp 16  Ht 1.626 m (5' 4\")  Wt 82.8 kg (182 lb 8 oz)  SpO2 97%  BMI 31.33 kg/m2  General - Well developed, well nourished female in no " apparent distress  HEENT:  Head normocephalic and atraumatic, pupils equal and round, conjunctivae clear, no scleral icterus, mucous membranes moist, external ears and nose normal  Neck: Supple without thyromegaly or masses  Lymphatic: No cervical, or supraclavicular lymphadenopathy  Lungs: Clear to auscultation bilaterally  Heart: regular rate and rhythm, no murmurs  Abdomen:   soft, rounded, non-distended without tenderness. no masses palpated  Extremities: Warm without edema  Neurologic: nonfocal  Psychiatric: Mood and affect appropriate  Skin: Without lesions, rashes, or juandice         Data:   WBC -   WBC   Date Value Ref Range Status   11/29/2017 5.7 4.0 - 11.0 10e9/L Final   ], HgB - Hemoglobin   Date Value Ref Range Status   11/29/2017 11.9 11.7 - 15.7 g/dL Final   ]   Liver Function Studies -   Recent Labs   Lab Test  11/29/17   0645   PROTTOTAL  6.3*   ALBUMIN  2.8*   BILITOTAL  0.4   ALKPHOS  132   AST  81*   ALT  104*         Imaging Studies:  All imaging studies reviewed by me.    Results for orders placed or performed during the hospital encounter of 11/28/17   US Abdomen Limited    Narrative    RIGHT UPPER QUADRANT ULTRASOUND 11/28/2017 7:41 PM    HISTORY:  Right upper quadrant pain.    COMPARISON: None.    FINDINGS:    Gallbladder: Multiple small gallstones in the gallbladder. No focal  tenderness, wall thickening or pericholecystic fluid. Normal overall  size.    Bile ducts:   CHD is normal diameter.  No intrahepatic biliary  dilatation.    Liver:   Normal.     Pancreas:  Normal body and neck. Head and tail obscured by gas.    Right kidney:   Normal.       Impression    IMPRESSION:  Cholelithiasis.    SAEID CAMERON MD          This note was created using voice recognition software. Undetected word substitutions or other errors may have occurred.     Mary Conroy MD    Time spent with the patient, reviewing the EMR, laboratory and imaging studies, more than 50% of which was counseling and  coordinating care:  30 minutes.

## 2017-11-29 NOTE — DISCHARGE INSTRUCTIONS
HOME CARE FOLLOWING LAPAROSCOPIC CHOLECYSTECTOMY  EVAN Peres E. Gavin, N. Guttormson, D. Maurer, JOHNNY Gregg, TEE Colon    INCISIONAL CARE:  Replace the bandage over your incisions until all drainage stops, or if more comfortable to have in place.  If present, leave the steri-strips (white paper tapes) in place for 14 days after surgery.  If Dermabond (a type of skin glue) is present, leave in place until it wears/flakes off.     BATHING:  Avoid baths for 1 week after surgery.  Showers are okay.  You may wash your hair at any time.  Gently pat your incisions dry after bathing.    ACTIVITY:  Light Activity -- you may immediately be up and about as tolerated.  Driving -- you may drive when comfortable and off narcotic pain medications.  Light Work -- resume when comfortable off pain medications.  (If you can drive, you probably can work.)  Strenuous Work/Activity -- limit lifting to 20 pounds for 1 week.  Progressively increase with time.  Active Sports (running, biking, etc.) -- cautiously resume after 2 weeks.    DISCOMFORT:  Use pain medications as prescribed by your surgeon.  Take the pain medication with some food, when possible, to minimize side effects.  Intermittent use of ice packs at the incision sites may help during the first 48 hours.  Expect gradual improvement.  You may experience shoulder pain, which is due to the air placed within your abdomen during the procedure.  This is temporary and usually passes within 2 days.    DIET:  Drink plenty of fluids.  While taking pain medications, increase dietary fiber or add a fiber supplementation like Metamucil or Citrucel to help prevent constipation - a possible side effect of pain medications.  It is not uncommon to experience some bowel changes (loose stools or constipation) after surgery.  Your body has to adapt to you no longer having a gall bladder.  To help minimize this side effect, avoid fatty foods for the first week after surgery.   You may then slowly increase the amount of fatty foods in your diet.      NAUSEA:  If nauseated from the anesthetic/pain meds; rest in bed, get up cautiously with assistance, and drink clear liquids (juice, tea, broth).    RETURN APPOINTMENT:  Schedule a follow-up visit 2-3 weeks post-op.  Office Phone:  [phone]     CONTACT US IF THE FOLLOWING DEVELOPS:   1. A fever that is above 101     2. If there is a large amount of drainage, bleeding, or swelling.   3. Severe pain that is not relieved by your prescription.   4. Drainage that is thick, cloudy, yellow, green or white.   5. Any other questions not answered by  Frequently Asked Questions  sheet.      FREQUENTLY ASKED QUESTIONS:    Q:  How should my incision look?    A:  Normally your incision will appear slightly swollen with light redness directly along the incision itself as it heals.  It may feel like a bump or ridge as the healing/scarring happens, and over time (3-4 months) this bump or ridge feeling should slowly go away.  In general, clear or pink watery drainage can be normal at first as your incision heals, but should decrease over time.    Q:  How do I know if my incision is infected?  A:  Look at your incision for signs of infection, like redness around the incision spreading to surrounding skin, or drainage of cloudy or foul-smelling drainage.  If you feel warm, check your temperature to see if you are running a fever.    **If any of these things occur, please notify the nurse at our office.  We may need you to come into the office for an incision check.      Q:  How do I take care of my incision?  A:  If you have a dressing in place - Starting the day after surgery, replace the dressing 1-2 times a day until there is no further drainage from the incision.  At that time, a dressing is no longer needed.  Try to minimize tape on the skin if irritation is occurring at the tape sites.  If you have significant irritation from tape on the skin, please  call the office to discuss other method of dressing your incision.    Small pieces of tape called  steri-strips  may be present directly overlying your incision; these may be removed 10 days after surgery unless otherwise specified by your surgeon.  If these tapes start to loosen at the ends, you may trim them back until they fall off or are removed.    A:  If you had  Dermabond  tissue glue used as a dressing (this causes your incision to look shiny with a clear covering over it) - This type of dressing wears off with time and does not require more dressings over the top unless it is draining around the glue as it wears off.  Do not apply ointments or lotions over the incisions until the glue has completely worn off.    Q:  There is a piece of tape or a sticky  lead  still on my skin.  Can I remove this?  A:  Sometimes the sticky  leads  used for monitoring during surgery or for evaluation in the emergency department are not all removed while you are in the hospital.  These sometimes have a tab or metal dot on them.  You can easily remove these on your own, like taking off a band-aid.  If there is a gel substance under the  lead , simply wipe/clean it off with a washcloth or paper towel.      Q:  What can I do to minimize constipation (very hard stools, or lack of stools)?  A:  Stay well hydrated.  Increase your dietary fiber intake or take a fiber supplement -with plenty of water.  Walk around frequently.  You may consider an over-the-counter stool-softener.  Your Pharmacist can assist you with choosing one that is stocked at your pharmacy.  Constipation is also one of the most common side effects of pain medication.  If you are using pain medication, be pro-active and try to PREVENT problems with constipation by taking the steps above BEFORE constipation becomes a problem.    Q:  What do I do if I need more pain medications?  A:  Call the office to receive refills.  Be aware that certain pain meds cannot be  called into a pharmacy and actually require a paper prescription.  A change may be made in your pain med as you progress thru your recovery period or if you have side effects to certain meds.    --Pain meds are NOT refilled after 5pm on weekdays, and NOT AT ALL on the weekends, so please look ahead to prevent problems.      Q:  Why am I having a hard time sleeping now that I am at home?  A:  Many medications you receive while you are in the hospital can impact your sleep for a number of days after your surgery/hospitalization.  Decreased level of activity and naps during the day may also make sleeping at night difficult.  Try to minimize day-time naps, and get up frequently during the day to walk around your home during your recovery time.  Sleep aides may be of some help, but are not recommended for long-term use.      Q:  I am having some back discomfort.  What should I do?  A:  This may be related to certain positioning that was required for your surgery, extended periods of time in bed, or other changes in your overall activity level.  You may try ice, heat, acetaminophen, or ibuprofen to treat this temporarily.  Note that many pain medications have acetaminophen in them and would state this on the prescription bottle.  Be sure not to exceed the maximum of 4000mg per day of acetaminophen.     **If the pain you are having does not resolve, is severe, or is a flare of back pain you have had on other occasions prior to surgery, please contact your primary physician for further recommendations or for an appointment to be examined at their office.    Q:  Why am I having headaches?  A:  Headaches can be caused by many things:  caffeine withdrawal, use of pain meds, dehydration, high blood pressure, lack of sleep, over-activity/exhaustion, flare-up of usual migraine headaches.  If you feel this is related to muscle tension (a band-like feeling around the head, or a pressure at the low-back of the head) you may try ice  or heat to this area.  You may need to drink more fluids (try electrolyte drink like Gatorade), rest, or take your usual migraine medications.   **If your headaches do not resolve, worsen, are accompanied by other symptoms, or if your blood pressure is high, please call your primary physician for recommendation and/or examination.    Q:  I am unable to urinate.  What do I do?  A:  A small percentage of people can have difficulty urinating initially after surgery.  This includes being able to urinate only a very small amount at a time and feeling discomfort or pressure in the very low abdomen.  This is called  urinary retention , and is actually an urgent situation.  Proceed to your nearest Emergency department for evaluation (not an Urgent Care Center).  Sometimes the bladder does not work correctly after certain medications you receive during surgery, or related to certain procedures.  You may need to have a catheter placed until your bladder recovers.  When planning to go to an Emergency department, it may help to call the ER to let them know you are coming in for this problem after a surgery.  This may help you get in quicker to be evaluated.  **If you have symptoms of a urinary tract infection, please contact your primary physician for the proper evaluation and treatment.    If you have other questions, please call the office Monday thru Friday between 8am and 5pm to discuss with the nurse or physician assistant.  #[phone]    There is a surgeon ON CALL on weekday evenings and over the weekend in case of urgent need only, and may be contacted at the same number.    If you are having an emergency, call 911 or proceed to your nearest emergency department.      GENERAL ANESTHESIA OR SEDATION ADULT DISCHARGE INSTRUCTIONS   SPECIAL PRECAUTIONS FOR 24 HOURS AFTER SURGERY    IT IS NOT UNUSUAL TO FEEL LIGHT-HEADED OR FAINT, UP TO 24 HOURS AFTER SURGERY OR WHILE TAKING PAIN MEDICATION.  IF YOU HAVE THESE SYMPTOMS;  SIT FOR A FEW MINUTES BEFORE STANDING AND HAVE SOMEONE ASSIST YOU WHEN YOU GET UP TO WALK OR USE THE BATHROOM.    YOU SHOULD REST AND RELAX FOR THE NEXT 24 HOURS AND YOU MUST MAKE ARRANGEMENTS TO HAVE SOMEONE STAY WITH YOU FOR AT LEAST 24 HOURS AFTER YOUR DISCHARGE.  AVOID HAZARDOUS AND STRENUOUS ACTIVITIES.  DO NOT MAKE IMPORTANT DECISIONS FOR 24 HOURS.    DO NOT DRIVE ANY VEHICLE OR OPERATE MECHANICAL EQUIPMENT FOR 24 HOURS FOLLOWING THE END OF YOUR SURGERY.  EVEN THOUGH YOU MAY FEEL NORMAL, YOUR REACTIONS MAY BE AFFECTED BY THE MEDICATION YOU HAVE RECEIVED.    DO NOT DRINK ALCOHOLIC BEVERAGES FOR 24 HOURS FOLLOWING YOUR SURGERY.    DRINK CLEAR LIQUIDS (APPLE JUICE, GINGER ALE, 7-UP, BROTH, ETC.).  PROGRESS TO YOUR REGULAR DIET AS YOU FEEL ABLE.    YOU MAY HAVE A DRY MOUTH, A SORE THROAT, MUSCLES ACHES OR TROUBLE SLEEPING.  THESE SHOULD GO AWAY AFTER 24 HOURS.    CALL YOUR DOCTOR FOR ANY OF THE FOLLOWING:  SIGNS OF INFECTION (FEVER, GROWING TENDERNESS AT THE SURGERY SITE, A LARGE AMOUNT OF DRAINAGE OR BLEEDING, SEVERE PAIN, FOUL-SMELLING DRAINAGE, REDNESS OR SWELLING.    IT HAS BEEN OVER 8 TO 10 HOURS SINCE SURGERY AND YOU ARE STILL NOT ABLE TO URINATE (PASS WATER).       Diet - Clear liquids and crackers the night of the 29th  Resume normal diet Thursday November 30th.

## 2017-11-29 NOTE — H&P
[facility]    Hospitalist History and Physical    Name: Paulo Lema    MRN: [id number]  YOB: 1990    Age: 27 year old  Date of Admission:  2017  Date of Service (when I saw the patient): 17    Assessment & Plan   Paulo Lema is a 27 year old female with PMH significant for gestational diabetes and two weeks s/p  section who presents with epigastric abdominal pain. ED workup reveals bilirubin of 0.4, alkaline phosphatase of 126, ALT of 77, AST of 122, lipase of 5705, CBC WNL, and abdominal ultrasound shows cholelithiasis.      1. Gallstone pancreatitis: three episodes of acute epigastric pain with radiation across her upper abdomen and around her back with associated nausea and episodes of vomiting, most recent episode this evening prior to presenting to the ED. Elevated ALT and AST with normal alkaline phosphatase. No signs of dilated CBD on RUQ ultrasound but does not cholelithiasis, suspect the patient passed the stone. Her lipase is elevated at 5705 which is consistent with gallstone pancreatitis and ultimately the patient needs a cholecystectomy.   - IVF hydration, antiemetics, and IV Dilaudid PRN for pain control    - General surgery consult   - Recheck CMP, lipase, and CBC in AM    2. S/p  section: performed 2 weeks ago without complications. Seen by Dr. Carrasco of OB in clinic today for follow up with patient's incision healing well. Patient is currently breast feeding.   - Discussed pumping and dumping while receiving IV narcotics     DVT Prophylaxis: Pneumatic Compression Devices  Code Status: Full Code, discussed with patient   Disposition: Expected stay >2 midnights     Primary Care Physician   Disha Tsang    Chief Complaint   Abdominal pain    History obtained from discussion with ED provider, chart review, and interview with patient.      History of Present Illness   Paulo Lema is a 27 year old female who presents with abdominal  pain. The patient states the Friday after Thanksgiving around 19:00 she had an acute onset of epigastric abdominal pain that radiated across her upper abdomen and into her back in a band like pattern. She describes the pain as shooting knives that almost felt like contractions that she rates at 7-8/10. At this time her chest appeared red and blotchy. She had associated nausea. The patient contacted the nurse line of her OB and was told she could take 10 mg of Oxycodone the patient had prescribed from her recent  along with topical icy hot. After taking the oxycodone the patient attempted to eat pizza and shortly after vomited 3-4 times. Her symptoms persisted for 1.5 hours before resolving. Prior to onset of her symptoms the patient reports having taco bake for dinner. Yesterday midday the patient had another episode of similar symptoms but less severe, she rated her pain at 3/10 at this time. She did not have any Oxycodone with her so she used some topical ivy hot which helped. Prior to this episode the patient had a cheeseburger for lunch. Today around 17:00 the patient was sitting on the couch holding her  when the severe pain in her epigastrium returned, she tried the topical icy hot as well taking deep breaths to breath through the pain. She took 5 mg of Oxycodone as well OTC Ibuprofen without relief. The patient took her temperature at this time and it was 99.4, she felt warm as well chilled. She felt nauseated during this episode but did not vomit. It took about a hour for her pain to resolve. She last ate soup and salad for lunch at 13:30 today. Denies chest pain, shortness of breath, or urinary symptoms. The patient's last BM was earlier today and normal. Denies h/o bleeding disorders or family history. She has not previously undergone anesthesia.          Past Medical History    Past Medical History:   Diagnosis Date     Diabetes (H)     w/ first pregnancy-on insulin     Past Surgical  History   Past Surgical History:   Procedure Laterality Date      SECTION N/A 2017    Procedure:  SECTION;   section;  Surgeon: Blanca Villarreal MD;  Location: [facility] L+D     Prior to Admission Medications   Prior to Admission Medications   Prescriptions Last Dose Informant Patient Reported? Taking?   DACIA CONTOUR NEXT test strip   No No   Sig: USE TO TEST BLOOD SUGAR FOUR TIMES DAILY OR AS DIRECTED   Misc. Devices (BREAST PUMP) MISC   No No   Si each daily   Prenatal Vit-Fe Fumarate-FA (PRENATAL MULTIVITAMIN  PLUS IRON) 27-0.8 MG TABS per tablet   Yes No   Sig: Take 1 tablet by mouth daily   acetone, Urine, test STRP   No No   Sig: Test once daily x1 week, then reduce to once weekly if consistently negative   blood glucose monitoring (DACIA MICROLET) lancets   No No   Sig: Use to test blood sugar 4 times daily or as directed.   doxylamine (UNISOM) 25 MG TABS tablet   Yes No   Sig: Take 25 mg by mouth At Bedtime   ibuprofen (ADVIL/MOTRIN) 600 MG tablet   No No   Sig: Take 1 tablet (600 mg) by mouth every 6 hours as needed for moderate pain   insulin isophane human (HUMULIN N PEN) 100 UNIT/ML injection   No No   Sig: Inject up to 12 units before bed every day.   insulin pen needle (INSUPEN PEN NEEDLES) 32G X 4 MM   No No   Sig: Use 1 pen needle daily or as directed.   oxyCODONE IR (ROXICODONE) 5 MG tablet 2017 at 1700  No Yes   Sig: Take 1-2 tablets (5-10 mg) by mouth every 4 hours as needed for pain maximum 8 tablet(s) per day      Facility-Administered Medications: None     Allergies   Allergies   Allergen Reactions     Reglan [Metoclopramide] Other (See Comments)     Light headed     Social History   Social History   Substance Use Topics     Smoking status: Never Smoker     Smokeless tobacco: Never Used     Alcohol use No     Social History     Social History Narrative     Family History   Family history reviewed with patient and is noncontributory.    Review of Systems    A Comprehensive greater than 10 system review of systems was carried out.  Pertinent positives and negatives are noted above.  Otherwise negative for contributory information.    Physical Exam   Temp: 98.3  F (36.8  C) Temp src: Oral BP: (!) 158/100   Heart Rate: 89 Resp: 16 SpO2: 98 % O2 Device: None (Room air)    Vital Signs with Ranges  Temp:  [98.3  F (36.8  C)] 98.3  F (36.8  C)  Heart Rate:  [89] 89  Resp:  [16] 16  BP: (122-158)/() 158/100  SpO2:  [96 %-100 %] 98 %  0 lbs 0 oz    GEN:  Alert, oriented x 3, appears comfortable, no overt distress  HEENT:  Normocephalic/atraumatic, no scleral icterus, no nasal discharge, mouth moist.  CV:  Regular rate and rhythm, no murmur or JVD.  S1 + S2 noted, no S3 or S4.  LUNGS:  Clear to auscultation bilaterally without rales/rhonchi/wheezing/retractions.  Symmetric chest rise on inhalation noted.  ABD:  Active bowel sounds, soft, tender in epigastrium, non-distended. No rebound/guarding/rigidity.  incision c/d/i.   EXT:  No edema.  No cyanosis.  No acute joint synovitis noted.  SKIN:  Dry to touch, no exanthems noted in the visualized areas.  NEURO:  Symmetric muscle strength, sensation to touch grossly intact.  Coordination symmetric on general exam.  No new focal deficits appreciated.    Data   Data reviewed today:  I personally reviewed all labs and imaging from this visit.    Results for orders placed or performed during the hospital encounter of 17   US Abdomen Limited    Narrative    RIGHT UPPER QUADRANT ULTRASOUND 2017 7:41 PM    HISTORY:  Right upper quadrant pain.    COMPARISON: None.    FINDINGS:    Gallbladder: Multiple small gallstones in the gallbladder. No focal  tenderness, wall thickening or pericholecystic fluid. Normal overall  size.    Bile ducts:   CHD is normal diameter.  No intrahepatic biliary  dilatation.    Liver:   Normal.     Pancreas:  Normal body and neck. Head and tail obscured by gas.    Right kidney:   Normal.        Impression    IMPRESSION:  Cholelithiasis.     CBC with platelets differential   Result Value Ref Range    WBC 10.8 4.0 - 11.0 10e9/L    RBC Count 4.08 3.8 - 5.2 10e12/L    Hemoglobin 12.4 11.7 - 15.7 g/dL    Hematocrit 37.0 35.0 - 47.0 %    MCV 91 78 - 100 fl    MCH 30.4 26.5 - 33.0 pg    MCHC 33.5 31.5 - 36.5 g/dL    RDW 11.9 10.0 - 15.0 %    Platelet Count 393 150 - 450 10e9/L    Diff Method Automated Method     % Neutrophils 70.4 %    % Lymphocytes 17.0 %    % Monocytes 6.9 %    % Eosinophils 4.7 %    % Basophils 0.5 %    % Immature Granulocytes 0.5 %    Nucleated RBCs 0 0 /100    Absolute Neutrophil 7.6 1.6 - 8.3 10e9/L    Absolute Lymphocytes 1.8 0.8 - 5.3 10e9/L    Absolute Monocytes 0.7 0.0 - 1.3 10e9/L    Absolute Eosinophils 0.5 0.0 - 0.7 10e9/L    Absolute Basophils 0.1 0.0 - 0.2 10e9/L    Abs Immature Granulocytes 0.1 0 - 0.4 10e9/L    Absolute Nucleated RBC 0.0    Comprehensive metabolic panel   Result Value Ref Range    Sodium 140 133 - 144 mmol/L    Potassium 3.4 3.4 - 5.3 mmol/L    Chloride 107 94 - 109 mmol/L    Carbon Dioxide 26 20 - 32 mmol/L    Anion Gap 7 3 - 14 mmol/L    Glucose 103 (H) 70 - 99 mg/dL    Urea Nitrogen 11 7 - 30 mg/dL    Creatinine 0.77 0.52 - 1.04 mg/dL    GFR Estimate >90 >60 mL/min/1.7m2    GFR Estimate If Black >90 >60 mL/min/1.7m2    Calcium 8.6 8.5 - 10.1 mg/dL    Bilirubin Total 0.4 0.2 - 1.3 mg/dL    Albumin 2.9 (L) 3.4 - 5.0 g/dL    Protein Total 6.5 (L) 6.8 - 8.8 g/dL    Alkaline Phosphatase 126 40 - 150 U/L    ALT 77 (H) 0 - 50 U/L     (H) 0 - 45 U/L   Lipase   Result Value Ref Range    Lipase 5705 (H) 73 - 393 U/L       Eugenia CHUNG I discussed the patient with Dr. Santa and he agrees with the above plan.

## 2017-11-29 NOTE — PHARMACY-ADMISSION MEDICATION HISTORY
Admission medication history interview status for this patient is complete. See [facility] admission navigator for allergy information, prior to admission medications and immunization status.     Medication history interview source(s):Patient  Medication history resources (including written lists, pill bottles, clinic record):[facility] List  Primary pharmacy:[facility] [place]    Changes made to [facility] medication list:  Added: Probiotic  Deleted: Doxylamine, Humulin  Changed: None  - Patient only needed insulin during her pregnancy    Actions taken by pharmacist (provider contacted, etc):None     Additional medication history information:None    Medication reconciliation/reorder completed by provider prior to medication history? No    Do you take OTC medications (eg tylenol, ibuprofen, fish oil, eye/ear drops, etc)? Y      Prior to Admission medications    Medication Sig Last Dose Taking? Auth Provider   Probiotic Product (RA PROBIOTIC GUMMIES) CHEW Take 2 chew tab by mouth daily 11/28/2017 at am Yes Unknown, Entered By History   oxyCODONE IR (ROXICODONE) 5 MG tablet Take 1-2 tablets (5-10 mg) by mouth every 4 hours as needed for pain maximum 8 tablet(s) per day 11/28/2017 at 1700 Yes Kaykay Eastman,    ibuprofen (ADVIL/MOTRIN) 600 MG tablet Take 1 tablet (600 mg) by mouth every 6 hours as needed for moderate pain 11/28/2017 at 1700 Yes Kaykay Eastman, DO   Prenatal Vit-Fe Fumarate-FA (PRENATAL MULTIVITAMIN  PLUS IRON) 27-0.8 MG TABS per tablet Take 1 tablet by mouth daily 11/28/2017 at am Yes Reported, Patient   Misc. Devices (BREAST PUMP) MISC 1 each daily   Disha Tsang MD   insulin pen needle (INSUPEN PEN NEEDLES) 32G X 4 MM Use 1 pen needle daily or as directed.   Isaiah Madrid MD   blood glucose monitoring (DACIA MICROLET) lancets Use to test blood sugar 4 times daily or as directed.   Karissa Wilkins MD   acetone, Urine, test STRP Test once daily x1 week, then reduce to once weekly if consistently negative    Karissa Wilkins MD   [employer] CONTOUR NEXT test strip USE TO TEST BLOOD SUGAR FOUR TIMES DAILY OR AS DIRECTED   Karissa Wilkins MD

## 2017-11-30 LAB — COPATH REPORT: NORMAL

## 2017-11-30 NOTE — PROGRESS NOTES
Patient meeting goals, tolerating diet, okay to DC. Discharge instructions reviewed with patient and . All questions answered. Patient plans to schedule follow up appointment as instructed. Prescriptions filled and sent with patient. Patient discharged to home with family.

## 2017-12-05 ENCOUNTER — DOCUMENTATION ONLY (OUTPATIENT)
Dept: OBGYN | Facility: CLINIC | Age: 27
End: 2017-12-05

## 2017-12-05 NOTE — PROGRESS NOTES
Our goal is to have forms completed within 72 hours, however some forms may require a visit or additional information.    The form was placed at [facility]    Who is the form from? Patient  Where did the form come from?   The form was placed in the inbox of: OB Providers - Dr. Disha Tsang    Please fax to [phone]    Additional comments: none    Call take on 12/5/2017 at 2:09 PM by SHARONDA AU    Forms completed, faxed and copy sent to scanning. Sharonda Au LPN

## 2017-12-13 ENCOUNTER — OFFICE VISIT (OUTPATIENT)
Dept: SURGERY | Facility: CLINIC | Age: 27
End: 2017-12-13
Payer: COMMERCIAL

## 2017-12-13 VITALS
OXYGEN SATURATION: 96 % | WEIGHT: 180 LBS | HEART RATE: 100 BPM | SYSTOLIC BLOOD PRESSURE: 122 MMHG | RESPIRATION RATE: 16 BRPM | BODY MASS INDEX: 30.73 KG/M2 | HEIGHT: 64 IN | DIASTOLIC BLOOD PRESSURE: 78 MMHG

## 2017-12-13 DIAGNOSIS — Z09 SURGICAL FOLLOWUP VISIT: Primary | ICD-10-CM

## 2017-12-13 PROCEDURE — 99024 POSTOP FOLLOW-UP VISIT: CPT | Performed by: PHYSICIAN ASSISTANT

## 2017-12-13 NOTE — LETTER
2017    Re: Paulo Lema - 1990    Paulo Lema is here for her first postoperative visit.  She was admitted with gallstone pancreatitis on .  She underwent Laparoscopic Cholecystectomy with cholangiogram (showing no filling defects within the biliary tree) by Dr. Conroy on .  Final pathology revealed chronic cholecystitis with cholesterolosis and stones.       She is now 2 weeks postop.  Today she is feeling well, eating well, having normal bowel movements and denies incision concerns.  She denies epigastric discomfort at all.  Her recent medications include: none.  She has no concerns about her recovery today.  She has tried to follow weight restrictions, but notes she has a one month old and must at times carry her in the car seat, etc.     Objective:  Abd - soft, non-tender, non-distended  Incs - healing well, no erythema/bruising, +normal healing ridges/density, no seroma/hematoma noted, no hernia noted     Assessment:  S/p Laparoscopic Cholecystectomy; unremarkable recovery.     Plan:  No need to repeat labs at this time as she is feeling no concerning symptoms; we discussed that this may simply be rechecked at her next appt with PCP.       Paulo may continue to slowly advance her activities at this time.  General recommendation is to remain at a 30 lb weight restriction until 3 weeks after surgery.  After that time, she may increase activity as tolerated.  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  She may continue to utilize OTC pain management options as well as use of ice/heat to sites for comfort.  She should expect progressive resolution of the healing ridge along the incisional sites, normalizing bowel function, and improvement of food intolerances over the following 2-3 months.     Pt is recommended to contact the office if worsening pain, onset of fever/redness at any inc site, or new drainage from the area.  Pt also recommended to call  office at any time if ongoing questions/concerns during recovery, but otherwise may follow-up on a prn basis.  Paulo is in agreement with this plan.     Shelly Cantu PA-C

## 2017-12-13 NOTE — PROGRESS NOTES
[facility] Note 12/13/2017  Subjective:  Paulo Lema is here for her first postoperative visit.  She was admitted with gallstone pancreatitis on November/28.  She underwent Laparoscopic Cholecystectomy with cholangiogram (showing no filling defects within the biliary tree) by Dr. Conroy on November/29.  Final pathology revealed chronic cholecystitis with cholesterolosis and stones.      She is now 2 weeks postop.  Today she is feeling well, eating well, having normal bowel movements and denies incision concerns.  She denies epigastric discomfort at all.  Her recent medications include: none.  She has no concerns about her recovery today.  She has tried to follow weight restrictions, but notes she has a one month old and must at times carry her in the car seat, etc.    Objective:  Abd - soft, non-tender, non-distended  Incs - healing well, no erythema/bruising, +normal healing ridges/density, no seroma/hematoma noted, no hernia noted    Assessment:  S/p Laparoscopic Cholecystectomy; unremarkable recovery.    Plan:  No need to repeat labs at this time as she is feeling no concerning symptoms; we discussed that this may simply be rechecked at her next appt with PCP.      Paulo may continue to slowly advance her activities at this time.  General recommendation is to remain at a 30 lb weight restriction until 3 weeks after surgery.  After that time, she may increase activity as tolerated.  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  She may continue to utilize OTC pain management options as well as use of ice/heat to sites for comfort.  She should expect progressive resolution of the healing ridge along the incisional sites, normalizing bowel function, and improvement of food intolerances over the following 2-3 months.    Pt is recommended to contact the office if worsening pain, onset of fever/redness at any inc site, or new drainage from the area.  Pt also recommended to call office at  any time if ongoing questions/concerns during recovery, but otherwise may follow-up on a prn basis.  Paulo is in agreement with this plan.    Shelly Cantu PA-C      Please route or send letter to:  PCP

## 2017-12-13 NOTE — MR AVS SNAPSHOT
"              After Visit Summary   12/13/2017    Paulo Lema    MRN: [id number]           Patient Information     Date Of Birth          1990        Visit Information        Provider Department      12/13/2017 10:00 AM Shelly Cantu PA-C [facility] General Surgery      Today's Diagnoses     Surgical followup visit    -  1       Follow-ups after your visit        Follow-up notes from your care team     Return if symptoms worsen or fail to improve.      Your next 10 appointments already scheduled     Jan 03, 2018  1:30 PM CST   [facility]um with Disha Tsang MD   [facility]    [address]  [facility] [id number] [phone]              Who to contact     If you have questions or need follow up information about today's clinic visit or your schedule please contact [facility] directly at [phone].  Normal or non-critical lab and imaging results will be communicated to you by MyChart, letter or phone within 4 business days after the clinic has received the results. If you do not hear from us within 7 days, please contact the clinic through [employer] or phone. If you have a critical or abnormal lab result, we will notify you by phone as soon as possible.  Submit refill requests through [employer] or call your pharmacy and they will forward the refill request to us. Please allow 3 business days for your refill to be completed.          Additional Information About Your Visit        [employer] Information     [employer] lets you send messages to your doctor, view your test results, renew your prescriptions, schedule appointments and more. To sign up, go to [email or web address] . Click on \"Log in\" on the left side of the screen, which will take you to the Welcome page. Then click on \"Sign up Now\" on the right side of the page.     You will be asked to enter the access code " "listed below, as well as some personal information. Please follow the directions to create your username and password.     Your access code is: [email or web address]  Expires: 2018  2:28 PM     Your access code will  in 90 days. If you need help or a new code, please call your [place] clinic or [phone].        Care EveryWhere ID     This is your Care EveryWhere ID. This could be used by other organizations to access your [place] medical records  [id number]        Your Vitals Were     Pulse Respirations Height Pulse Oximetry BMI (Body Mass Index)       100 16 5' 4\" (1.626 m) 96% 30.9 kg/m2        Blood Pressure from Last 3 Encounters:   17 122/78   17 146/79   17 122/76    Weight from Last 3 Encounters:   17 180 lb (81.6 kg)   17 180 lb (81.6 kg)   17 185 lb 6.4 oz (84.1 kg)              Today, you had the following     No orders found for display       Primary Care Provider Office Phone # Fax #    Disha Tsang [phone]       [address]  [facility] [address]        Equal Access to Services     CARROL RODRIGUEZ : Hadii fátima ku hadasho Soomaali, waaxda luqadaha, qaybta kaalmada adeegyada, waxay mari hayrebekah soriano. So [facility] [phone].    ATENCIÓN: Si habla español, tiene a golden disposición servicios gratuitos de asistencia lingüística. Llame al [phone].    We comply with applicable federal civil rights laws and [place] laws. We do not discriminate on the basis of race, color, national origin, age, disability, sex, sexual orientation, or gender identity.            Thank you!     Thank you for choosing [facility]  for your care. Our goal is always to provide you with excellent care. Hearing back from our patients is one way we can continue to improve our services. Please take a few minutes to complete the written survey that you may receive in the mail after your visit with us. Thank you!             Your " Updated Medication List - Protect others around you: Learn how to safely use, store and throw away your medicines at www.disposemymeds.org.          This list is accurate as of: 17 10:12 AM.  Always use your most recent med list.                   Brand Name Dispense Instructions for use Diagnosis    acetone (Urine) test Strp     100 each    Test once daily x1 week, then reduce to once weekly if consistently negative    Gestational diabetes       DACIA CONTOUR NEXT test strip   Generic drug:  blood glucose monitoring     300 strip    USE TO TEST BLOOD SUGAR FOUR TIMES DAILY OR AS DIRECTED    Gestational diabetes       blood glucose monitoring lancets     100 each    Use to test blood sugar 4 times daily or as directed.    Gestational diabetes       breast pump Misc     1 each    1 each daily        ibuprofen 600 MG tablet    ADVIL/MOTRIN    100 tablet    Take 1 tablet (600 mg) by mouth every 6 hours as needed for moderate pain    S/P primary low transverse        insulin pen needle 32G X 4 MM    INSUPEN PEN NEEDLES    100 each    Use 1 pen needle daily or as directed.    Gestational diabetes       ondansetron 4 MG ODT tab    ZOFRAN-ODT    4 tablet    Take 1-2 tablets (4-8 mg) by mouth every 8 hours as needed for nausea Dissolve ON the tongue.    Acute biliary pancreatitis without infection or necrosis       oxyCODONE IR 5 MG tablet    ROXICODONE    20 tablet    Take 1-2 tablets (5-10 mg) by mouth every 4 hours as needed for pain maximum 8 tablet(s) per day    S/P primary low transverse        prenatal multivitamin plus iron 27-0.8 MG Tabs per tablet      Take 1 tablet by mouth daily        RA PROBIOTIC GUMMIES Chew      Take 2 chew tab by mouth daily

## 2018-01-03 ENCOUNTER — PRENATAL OFFICE VISIT (OUTPATIENT)
Dept: OBGYN | Facility: CLINIC | Age: 28
End: 2018-01-03
Payer: COMMERCIAL

## 2018-01-03 VITALS
BODY MASS INDEX: 31.24 KG/M2 | HEART RATE: 88 BPM | SYSTOLIC BLOOD PRESSURE: 116 MMHG | DIASTOLIC BLOOD PRESSURE: 74 MMHG | WEIGHT: 182 LBS

## 2018-01-03 DIAGNOSIS — Z86.32 HISTORY OF GESTATIONAL DIABETES: ICD-10-CM

## 2018-01-03 PROCEDURE — 99207 ZZC POST PARTUM EXAM: CPT | Performed by: OBSTETRICS & GYNECOLOGY

## 2018-01-03 ASSESSMENT — PATIENT HEALTH QUESTIONNAIRE - PHQ9: SUM OF ALL RESPONSES TO PHQ QUESTIONS 1-9: 0

## 2018-01-03 NOTE — PROGRESS NOTES
SUBJECTIVE: Paulo is here for a 6-week postpartum checkup.    Delivery date was 11/12/17. She had a c/s for FTP of a viable girl, weight 7 pounds 11 oz., with Breast fed complications.  Since delivery, she has been breast feeding.  She has no signs of infection, bleeding or other complications.  She is not pregnant.  We discussed contraceptions and she has chosen mini pill / progesterone only pill.  She  has not had intercourse since delivery and complains of No discomfort. Patient screened for postpartum depression and complaints are NEGATIVE. Screening has also been completed for intimate partner violence.    EXAM:  Today's Depression Rating was   PHQ-9 SCORE 1/3/2018   Total Score 0     Nurse assisted visit.  Lilly Cabrera MA.    Gen: mood: happy  Abdomen: Benign, Soft, flat, non-tender, No masses, organomegaly Pfannenstiel incision clean, dry, and intact   Pelvis:  Perineum Normal. Pap not needed.      ASSESSMENT:   Normal postpartum exam after c/s for FTP.    PLAN:  Return as needed or at time of next expected pap, pelvic, or breast exam.    All methods of birth control including oral contraceptive pills, patches, vaginal ring, implant, shot, IUD (hormonal and copper), barrier methods discussed including risks, benefits, and alternatives to each. She is choosing to proceed with mini pill prescription. Discussed the differences between the progesterone-only pill and the combination oral contraceptive pills. Specifically, discussed taking this at the same time every day (within a 3 hour window), using back up contraception for 2-4 weeks after starting, using back up contraception for one week if she misses or is late taking a pill, and that there are no placebo pills in the pack. She states her understanding.    Future orders for 2 hr glucose tolerance test placed as she had gestational diabetes, insulin dependent.    Disha Tsang MD

## 2018-01-03 NOTE — NURSING NOTE
"Chief Complaint   Patient presents with     Post Partum Exam       Initial /74 (BP Location: Right arm, Patient Position: Chair, Cuff Size: Adult Regular)  Pulse 88  Wt 182 lb (82.6 kg)  Breastfeeding? Yes  BMI 31.24 kg/m2 Estimated body mass index is 31.24 kg/(m^2) as calculated from the following:    Height as of 12/13/17: 5' 4\" (1.626 m).    Weight as of this encounter: 182 lb (82.6 kg).  Medication Reconciliation: complete     Lilly Cabrera ma      "

## 2018-01-03 NOTE — MR AVS SNAPSHOT
After Visit Summary   1/3/2018    Paulo Lema    MRN: [id number]           Patient Information     Date Of Birth          1990        Visit Information        Provider Department      1/3/2018 1:30 PM Disha Tsang MD [facility]        Today's Diagnoses     Routine postpartum follow-up    -  1    History of gestational diabetes           Follow-ups after your visit        Your next 10 appointments already scheduled     Jan 11, 2018 11:00 AM CST   LAB with [facility]   [facility]    [address]  [facility] [id number] [phone]           Please do not eat 10-12 hours before your appointment if you are coming in fasting for labs on lipids, cholesterol, or glucose (sugar). This does not apply to pregnant women. Water, hot tea and black coffee (with nothing added) are okay. Do not drink other fluids, diet soda or chew gum.              Future tests that were ordered for you today     Open Future Orders        Priority Expected Expires Ordered    Glucose tolerance std non preg Routine  1/3/2019 1/3/2018            Who to contact     If you have questions or need follow up information about today's clinic visit or your schedule please contact [facility] directly at [phone].  Normal or non-critical lab and imaging results will be communicated to you by MyChart, letter or phone within 4 business days after the clinic has received the results. If you do not hear from us within 7 days, please contact the clinic through [employer] or phone. If you have a critical or abnormal lab result, we will notify you by phone as soon as possible.  Submit refill requests through "[employer] or call your pharmacy and they will forward the refill request to us. Please allow 3 business days for your refill to be completed.          Additional Information About Your Visit        "[employer] Information     "[employer] lets you send  "messages to your doctor, view your test results, renew your prescriptions, schedule appointments and more. To sign up, go to [email or web address] . Click on \"Log in\" on the left side of the screen, which will take you to the Welcome page. Then click on \"Sign up Now\" on the right side of the page.     You will be asked to enter the access code listed below, as well as some personal information. Please follow the directions to create your username and password.     Your access code is: [email or web address]  Expires: 2018  2:28 PM     Your access code will  in 90 days. If you need help or a new code, please call your [place] clinic or [phone].        Care EveryWhere ID     This is your Care EveryWhere ID. This could be used by other organizations to access your [place] medical records  [id number]        Your Vitals Were     Pulse Breastfeeding? BMI (Body Mass Index)             88 Yes 31.24 kg/m2          Blood Pressure from Last 3 Encounters:   18 116/74   17 122/78   17 146/79    Weight from Last 3 Encounters:   18 182 lb (82.6 kg)   17 180 lb (81.6 kg)   17 180 lb (81.6 kg)               Primary Care Provider Office Phone # Fax #    Disha Tsang [phone]       [address]  [facility] [address]        Equal Access to Services     [facility]: Hadii aad ku hadasho Soomaali, waaxda luqadaha, qaybta kaalmada adeegyada, sidra gresham . So [facility] [phone].    ATENCIÓN: Si habla español, tiene a golden disposición servicios gratuitos de asistencia lingüística. Llame al [phone].    We comply with applicable federal civil rights laws and [place] laws. We do not discriminate on the basis of race, color, national origin, age, disability, sex, sexual orientation, or gender identity.            Thank you!     Thank you for choosing [facility]  for your care. Our goal is always to provide you with " excellent care. Hearing back from our patients is one way we can continue to improve our services. Please take a few minutes to complete the written survey that you may receive in the mail after your visit with us. Thank you!             Your Updated Medication List - Protect others around you: Learn how to safely use, store and throw away your medicines at www.disposemymeds.org.          This list is accurate as of: 1/3/18  4:26 PM.  Always use your most recent med list.                   Brand Name Dispense Instructions for use Diagnosis    acetone (Urine) test Strp     100 each    Test once daily x1 week, then reduce to once weekly if consistently negative    Gestational diabetes       DACIA CONTOUR NEXT test strip   Generic drug:  blood glucose monitoring     300 strip    USE TO TEST BLOOD SUGAR FOUR TIMES DAILY OR AS DIRECTED    Gestational diabetes       blood glucose monitoring lancets     100 each    Use to test blood sugar 4 times daily or as directed.    Gestational diabetes       breast pump Misc     1 each    1 each daily        cholecalciferol 1000 UNIT tablet    vitamin D3    100 tablet    Take 1 tablet (1,000 Units) by mouth daily        ibuprofen 600 MG tablet    ADVIL/MOTRIN    100 tablet    Take 1 tablet (600 mg) by mouth every 6 hours as needed for moderate pain    S/P primary low transverse        prenatal multivitamin plus iron 27-0.8 MG Tabs per tablet      Take 1 tablet by mouth daily        RA PROBIOTIC GUMMIES Chew      Take 2 chew tab by mouth daily

## 2018-01-16 DIAGNOSIS — Z86.32 HISTORY OF GESTATIONAL DIABETES: ICD-10-CM

## 2018-01-16 PROCEDURE — 82950 GLUCOSE TEST: CPT | Performed by: OBSTETRICS & GYNECOLOGY

## 2018-01-16 PROCEDURE — 36415 COLL VENOUS BLD VENIPUNCTURE: CPT | Performed by: OBSTETRICS & GYNECOLOGY

## 2018-01-16 PROCEDURE — 82947 ASSAY GLUCOSE BLOOD QUANT: CPT | Performed by: OBSTETRICS & GYNECOLOGY

## 2018-01-17 LAB
GLUCOSE 2H P 75 G GLC PO SERPL-MCNC: 106 MG/DL (ref 60–200)
GLUCOSE PRE 75 G GLC PO SERPL-MCNC: 92 MG/DL (ref 60–126)

## 2018-01-21 ENCOUNTER — NURSE TRIAGE (OUTPATIENT)
Dept: NURSING | Facility: CLINIC | Age: 28
End: 2018-01-21

## 2018-01-21 NOTE — TELEPHONE ENCOUNTER
"  Additional Information    Negative: Shock suspected (e.g., cold/pale/clammy skin, too weak to stand, low BP, rapid pulse)    Negative: Difficult to awaken or acting confused  (e.g., disoriented, slurred speech)    Negative: Sounds like a life-threatening emergency to the triager    Negative: Vomiting occurs only while coughing    Negative: [1] Pregnant < 20 Weeks AND [2] nausea/vomiting began in early pregnancy (i.e., 4-8 weeks pregnant)    Negative: Chest pain    Negative: [1] SEVERE headache AND [2] similar to prior migraines    Negative: [1] Vomiting AND [2] contains red blood or black (\"coffee ground\") material  (Exception: few red streaks in vomit that only happened once)    Negative: Severe pain in one eye    Negative: Recent head injury (within last 3 days)    Negative: Recent abdominal injury (within last 3 days)    Negative: [1] Insulin-dependent diabetes (Type I) AND [2] glucose > 400 mg/dl (22 mmol/l)    Negative: [1] SEVERE vomiting (e.g., 6 or more times/day) AND [2] present > 8 hours    Negative: [1] MODERATE vomiting (e.g., 3 - 5 times/day) AND [2] age > 60    Negative: Severe headache (e.g., excruciating) (Exception: similar to previous migraines)    Negative: High-risk adult (e.g., diabetes mellitus, brain tumor, V-P shunt, hernia)    Negative: [1] Drinking very little AND [2] dehydration suspected (e.g., no urine > 12 hours, very dry mouth, very lightheaded)    Negative: Patient sounds very sick or weak to the triager    Negative: [1] MILD to MODERATE vomiting (e.g., 1-5 times/day) AND [2] abdomen looks much more swollen than usual    Negative: [1] Vomiting AND [2] contains bile (green color)    Negative: [1] Constant abdominal pain AND [2] present > 2 hours    Negative: [1] Fever > 103 F (39.4 C) AND [2] not able to get the fever down using Fever Care Advice    Negative: [1] Fever > 101 F (38.3 C) AND [2] age > 60    Negative: [1] Fever > 101 F (38.3 C) AND [2] bedridden (e.g., nursing home " "patient, CVA, chronic illness, recovering from surgery)    Negative: [1] Fever > 100.5 F (38.1 C) AND [2] weak immune system (e.g., HIV positive, cancer chemo, splenectomy, organ transplant, chronic steroids)    Negative: Taking any of the following medications: digoxin (Lanoxin), lithium, theophylline, phenytoin (Dilantin)    Negative: [1] MILD or MODERATE vomiting AND [2] present > 48 hours (2 days) (Exception: mild vomiting with associated diarrhea)    Negative: Fever present > 3 days (72 hours)    Negative: Vomiting a prescription medication    Negative: [1] MILD vomiting with diarrhea AND [2] present > 5 days    Negative: Alcohol or drug abuse, known or suspected    Negative: Vomiting is a chronic symptom (recurrent or ongoing AND present > 4 weeks)    MILD or MODERATE vomiting (e.g., 1 - 5 times / day) (all triage questions negative)     If fever does not come down with Tylenol you should be seen in UC or ER today.    Negative: [1] SEVERE vomiting (e.g., 6 or more times/day, vomits everything) BUT [2] hydrated (all triage questions negative)    Answer Assessment - Initial Assessment Questions  1. VOMITING SEVERITY: \"How many times have you vomited in the past 24 hours?\"      - MILD:  1 - 2 times/day     - MODERATE: 3 - 5 times/day, decreased oral intake without significant weight loss or symptoms of dehydration     - SEVERE: 6 or more times/day, vomits everything or nearly everything, with significant weight loss, symptoms of dehydration       moderate  2. ONSET: \"When did the vomiting begin?\"       Last night  3. FLUIDS: \"What fluids or food have you vomited up today?\" \"Have you been able to keep any fluids down?\"      Sips of water and gatoraide  4. ABDOMINAL PAIN: \"Are your having any abdominal pain?\" If yes : \"How bad is it and what does it feel like?\" (e.g., crampy, dull, intermittent, constant)       crampy  5. DIARRHEA: \"Is there any diarrhea?\" If so, ask: \"How many times today?\"       Yes, 3-5times  6. " "CONTACTS: \"Is there anyone else in the family with the same symptoms?\"       no  7. CAUSE: \"What do you think is causing your vomiting?\"      Not sure  8. HYDRATION STATUS: \"Any signs of dehydration?\" (e.g., dry mouth [not only dry lips], too weak to stand) \"When did you last urinate?\"      Urinated last 2 hours ago  9. OTHER SYMPTOMS: \"Do you have any other symptoms?\" (e.g., fever, headache, vertigo, vomiting blood or coffee grounds)      fever  10. PREGNANCY: \"Is there any chance you are pregnant?\" \"When was your last menstrual period?\"        no    Protocols used: VOMITING-ADULT-AH    "

## 2018-06-05 ENCOUNTER — OFFICE VISIT (OUTPATIENT)
Dept: FAMILY MEDICINE | Facility: CLINIC | Age: 28
End: 2018-06-05
Payer: COMMERCIAL

## 2018-06-05 VITALS
TEMPERATURE: 98.4 F | SYSTOLIC BLOOD PRESSURE: 110 MMHG | WEIGHT: 161.4 LBS | HEIGHT: 64 IN | DIASTOLIC BLOOD PRESSURE: 70 MMHG | BODY MASS INDEX: 27.55 KG/M2 | HEART RATE: 65 BPM | RESPIRATION RATE: 16 BRPM | OXYGEN SATURATION: 97 %

## 2018-06-05 DIAGNOSIS — F41.1 GAD (GENERALIZED ANXIETY DISORDER): Primary | ICD-10-CM

## 2018-06-05 PROCEDURE — 99213 OFFICE O/P EST LOW 20 MIN: CPT | Performed by: FAMILY MEDICINE

## 2018-06-05 RX ORDER — ESCITALOPRAM OXALATE 20 MG/1
TABLET ORAL
Qty: 30 TABLET | Refills: 1 | Status: SHIPPED | OUTPATIENT
Start: 2018-06-05 | End: 2018-07-17

## 2018-06-05 ASSESSMENT — ANXIETY QUESTIONNAIRES
1. FEELING NERVOUS, ANXIOUS, OR ON EDGE: NEARLY EVERY DAY
2. NOT BEING ABLE TO STOP OR CONTROL WORRYING: MORE THAN HALF THE DAYS
6. BECOMING EASILY ANNOYED OR IRRITABLE: NEARLY EVERY DAY
3. WORRYING TOO MUCH ABOUT DIFFERENT THINGS: MORE THAN HALF THE DAYS
IF YOU CHECKED OFF ANY PROBLEMS ON THIS QUESTIONNAIRE, HOW DIFFICULT HAVE THESE PROBLEMS MADE IT FOR YOU TO DO YOUR WORK, TAKE CARE OF THINGS AT HOME, OR GET ALONG WITH OTHER PEOPLE: SOMEWHAT DIFFICULT
7. FEELING AFRAID AS IF SOMETHING AWFUL MIGHT HAPPEN: NOT AT ALL
5. BEING SO RESTLESS THAT IT IS HARD TO SIT STILL: SEVERAL DAYS
GAD7 TOTAL SCORE: 12

## 2018-06-05 ASSESSMENT — PATIENT HEALTH QUESTIONNAIRE - PHQ9: 5. POOR APPETITE OR OVEREATING: SEVERAL DAYS

## 2018-06-05 NOTE — PROGRESS NOTES
SUBJECTIVE:   Paulo Lema is a 27 year old female who presents to clinic today for the following health issues:      Abnormal Mood Symptoms  Onset: always has had stopped medication while pregnant     Description:   Depression: no  Anxiety: YES    Accompanying Signs & Symptoms:  Still participating in activities that you used to enjoy: YES  Fatigue: no  Irritability: YES  Difficulty concentrating: no  Changes in appetite: no  Problems with sleep: YES- sometimes   Heart racing/beating fast : no  Thoughts of hurting yourself or others: none    History:   Recent stress: YES  Prior depression hospitalization: None  Family history of depression: YES  Family history of anxiety: YES    Precipitating factors:   Alcohol/drug use: no    Alleviating factors:  Walks     Therapies Tried and outcome: Lexapro (Escitalopram) - did well with this, stopped because she became pregnant      Would like to resume lexapro, breastfeeding currently.       Problem list and histories reviewed & adjusted, as indicated.  Additional history: none    Patient Active Problem List   Diagnosis     High risk pregnancy in young primigravida     S/P primary low transverse      Gallstone pancreatitis     ALLYSON (generalized anxiety disorder)     Past Surgical History:   Procedure Laterality Date      SECTION N/A 2017    Procedure:  SECTION;   section;  Surgeon: Blanca Villarreal MD;  Location:  [facility]     LAPAROSCOPIC CHOLECYSTECTOMY WITH CHOLANGIOGRAMS N/A 2017    Procedure: LAPAROSCOPIC CHOLECYSTECTOMY WITH CHOLANGIOGRAMS;  LAPAROSCOPIC CHOLECYSTECTOMY WITH CHOLANGIOGRAMS ;  Surgeon: Mary Conroy MD;  Location:  OR       Social History   Substance Use Topics     Smoking status: Never Smoker     Smokeless tobacco: Never Used     Alcohol use No     Family History   Problem Relation Age of Onset     Family History Negative Mother            Reviewed and updated as needed this visit by  "clinical staff  Tobacco  Allergies  Meds  Problems  Med Hx  Surg Hx  Fam Hx  Soc Hx        Reviewed and updated as needed this visit by Provider  Allergies  Meds  Problems         ROS:  Constitutional, HEENT, cardiovascular, pulmonary, gi and gu systems are negative, except as otherwise noted.    OBJECTIVE:     /70 (BP Location: Right arm, Patient Position: Chair, Cuff Size: Adult Regular)  Pulse 65  Temp 98.4  F (36.9  C) (Oral)  Resp 16  Ht 5' 4\" (1.626 m)  Wt 161 lb 6.4 oz (73.2 kg)  LMP 05/20/2018  SpO2 97%  Breastfeeding? Yes  BMI 27.7 kg/m2  Body mass index is 27.7 kg/(m^2).  GENERAL: healthy, alert and no distress  PSYCH: mentation appears normal, affect normal/bright    Diagnostic Test Results:  ALLYSON-7 SCORE 6/5/2018   Total Score 12         ASSESSMENT/PLAN:     1. ALLYSON (generalized anxiety disorder) - discussed diagnosis, medication, options, safety profile while breastfeeding. She would like to resume lexapro, discuss med start and follow up in 6-8 weeks.   - escitalopram (LEXAPRO) 20 MG tablet; Take 1/2 tablet (10 mg) for 1-2 weeks, then increase to 1 tablet orally daily  Dispense: 30 tablet; Refill: 1      Sharonda Engel MD  [facility]    "

## 2018-06-05 NOTE — NURSING NOTE
"Chief Complaint   Patient presents with     Anxiety     Depression       Initial /70 (BP Location: Right arm, Patient Position: Chair, Cuff Size: Adult Regular)  Pulse 65  Temp 98.4  F (36.9  C) (Oral)  Resp 16  Ht 5' 4\" (1.626 m)  Wt 161 lb 6.4 oz (73.2 kg)  LMP 05/20/2018  SpO2 97%  Breastfeeding? Yes  BMI 27.7 kg/m2 Estimated body mass index is 27.7 kg/(m^2) as calculated from the following:    Height as of this encounter: 5' 4\" (1.626 m).    Weight as of this encounter: 161 lb 6.4 oz (73.2 kg).  Medication Reconciliation: complete      Health Maintenance addressed:  NONE    N/a    CHERYL Rosario        "

## 2018-06-05 NOTE — MR AVS SNAPSHOT
After Visit Summary   6/5/2018    Paulo Lema    MRN: [id number]           Patient Information     Date Of Birth          1990        Visit Information        Provider Department      6/5/2018 9:20 AM Sharonda Engel MD [facility]        Today's Diagnoses     ALLYSON (generalized anxiety disorder)    -  1      Care Instructions      Treating Anxiety Disorders with Medicine  An anxiety disorder can make you feel nervous or apprehensive, even without a clear reason. In people age 65 and older, generalized anxiety disorder is one of the most commonly diagnosed anxiety disorders. Many times it occurs with depression. Certain anxiety disorders can cause intense feelings of fear or panic. You may even have physical symptoms such as a racing heartbeat, sweating, or dizziness. If you have these feelings, you don t have to suffer anymore. Treatment to help you overcome your fears will likely include therapy (also called counseling). Medicine may also be prescribed to help control your symptoms.    Medicines  Certain medicines may be prescribed to help control your symptoms. So you may feel less anxious. You may also feel able to move forward with therapy. At first, medicines and dosages may need to be adjusted to find what works best for you. Try to be patient. Tell your healthcare provider how a medicine makes you feel. This way, you can work together to find the treatment that s best for you. Keep in mind that medicines can have side effects. Talk with your provider about any side effects that are bothering you. Changing the dose or type of medicine may help. Don t stop taking medicine on your own. That can cause symptoms to come back.    Anti-anxiety medicine. This medicine eases symptoms and helps you relax. Your healthcare provider will explain when and how to use it. It may be prescribed for use before situations that make you anxious. You may also be told to take medicine on a  regular schedule. Anti-anxiety medicine may make you feel a little sleepy or  out of it.  Don t drive a car or operate machinery while on this medicine, until you know how it affects you.  Caution  Never use alcohol or other drugs with anti-anxiety medicines. This could result in loss of muscular control, sedation, coma, or death. Also, use only the amount of medicine prescribed for you. If you think you may have taken too much, get emergency care right away.     Antidepressant medicine. This kind of medicine is often used to treat anxiety, even if you aren t depressed. An antidepressant helps balance out brain chemicals. This helps keep anxiety under control. This medicine is taken on a schedule. It takes a few weeks to start working. If you don t notice a change at first, you may just need more time. But if you don t notice results after the first few weeks, tell your provider.  Keep taking medicines as prescribed  Never change your dosage, share or use another person's medicine, or stop taking your medicines without talking to your healthcare provider first. Keep the following in mind:    Some medicines must be taken on a schedule. Make this part of your daily routine. For instance, always take your pill before brushing your teeth. A pillbox can help you remember if you ve taken your medicine each day.    Medicines are often taken for 6 to 12 months. Your healthcare provider will then evaluate whether you need to stay on them. Many people who have also had therapy may no longer need medicine to manage anxiety.    You may need to stop taking medicine slowly to give your body time to adjust. When it s time to stop, your healthcare provider will tell you more. Remember: Never stop taking your medicine without talking to your provider first.    If symptoms return, you may need to start taking medicines again. This isn t your fault. It s just the nature of your anxiety disorder.  Special concerns    Side  effects. Medicines may cause side effects. Ask your healthcare provider or pharmacist what you can expect. They may have ideas for avoiding some side effects.    Sexual problems. Some antidepressants can affect your desire for sex or your ability to have an orgasm. A change in dosage or medicine often solves the problem. If you have a sexual side effect that concerns you, tell your healthcare provider.    Addiction. If you ve never had a problem with drugs or alcohol, you may not have a problem with medicines used to treat anxiety disorders. But always discuss the medicines with your healthcare provider before taking them. If you have a history of addiction, you may not be able to use certain medicines used to treat anxiety disorders.    Medicine interactions. Always check with your pharmacist before using any over-the-counter medicines, including herbal supplements.   Date Last Reviewed: 5/1/2017 2000-2017 [employer]. [address] [place] [address]. All rights reserved. This information is not intended as a substitute for professional medical care. Always follow your healthcare professional's instructions.                Follow-ups after your visit        Follow-up notes from your care team     Return in about 6 years (around 6/5/2024) for Depression/Anxiety Recheck.      Who to contact     If you have questions or need follow up information about today's clinic visit or your schedule please contact [facility] directly at [phone].  Normal or non-critical lab and imaging results will be communicated to you by MyChart, letter or phone within 4 business days after the clinic has received the results. If you do not hear from us within 7 days, please contact the clinic through MyChart or phone. If you have a critical or abnormal lab result, we will notify you by phone as soon as possible.  Submit refill requests through [employer] or call your pharmacy and they will forward  "the refill request to us. Please allow 3 business days for your refill to be completed.          Additional Information About Your Visit        [employer] Information     [employer] lets you send messages to your doctor, view your test results, renew your prescriptions, schedule appointments and more. To sign up, go to [email or web address] . Click on \"Log in\" on the left side of the screen, which will take you to the Welcome page. Then click on \"Sign up Now\" on the right side of the page.     You will be asked to enter the access code listed below, as well as some personal information. Please follow the directions to create your username and password.     Your access code is: [email or web address]  Expires: 9/3/2018  9:42 AM     Your access code will  in 90 days. If you need help or a new code, please call your [place] clinic or [phone].        Care EveryWhere ID     This is your Care EveryWhere ID. This could be used by other organizations to access your [place] medical records  [id number]        Your Vitals Were     Pulse Temperature Respirations Height Last Period Pulse Oximetry    65 98.4  F (36.9  C) (Oral) 16 5' 4\" (1.626 m) 2018 97%    Breastfeeding? BMI (Body Mass Index)                Yes 27.7 kg/m2           Blood Pressure from Last 3 Encounters:   18 110/70   18 116/74   17 122/78    Weight from Last 3 Encounters:   18 161 lb 6.4 oz (73.2 kg)   18 182 lb (82.6 kg)   17 180 lb (81.6 kg)              Today, you had the following     No orders found for display         Today's Medication Changes          These changes are accurate as of 18  9:42 AM.  If you have any questions, ask your nurse or doctor.               Start taking these medicines.        Dose/Directions    escitalopram 20 MG tablet   Commonly known as:  LEXAPRO   Used for:  ALLYSON (generalized anxiety disorder)   Started by:  Sharonda Engel MD        Take 1/2 tablet (10 mg) for 1-2 weeks, " then increase to 1 tablet orally daily   Quantity:  30 tablet   Refills:  1            Where to get your medicines      These medications were sent to [place] [address] [place] [facility] [address] [facility]  [address] [facility] [address]    Hours:  Tech issues with their phone system Phone:  [phone]     escitalopram 20 MG tablet                Primary Care Provider Office Phone # Fax #    Disha Tsang [phone] [address]  [facility] [address]        Equal Access to Services     [facility]SAY [facility] : Hadii aad ku hadasho Soomaali, waaxda luqadaha, qaybta kaalmada adeegyada, waxay idiin hayaan adeeg kharakalin gresham . So [facility] [phone].    ATENCIÓN: Si habla español, tiene a golden disposición servicios gratuitos de asistencia lingüística. [facility] [phone].    We comply with applicable federal civil rights laws and [place] laws. We do not discriminate on the basis of race, color, national origin, age, disability, sex, sexual orientation, or gender identity.            Thank you!     Thank you for choosing [facility]  for your care. Our goal is always to provide you with excellent care. Hearing back from our patients is one way we can continue to improve our services. Please take a few minutes to complete the written survey that you may receive in the mail after your visit with us. Thank you!             Your Updated Medication List - Protect others around you: Learn how to safely use, store and throw away your medicines at www.disposemymeds.org.          This list is accurate as of 6/5/18  9:42 AM.  Always use your most recent med list.                   Brand Name Dispense Instructions for use Diagnosis    cholecalciferol 1000 UNIT tablet    vitamin D3    100 tablet    Take 1 tablet (1,000 Units) by mouth daily        escitalopram 20 MG tablet    LEXAPRO    30 tablet    Take 1/2 tablet (10 mg) for 1-2 weeks, then increase to 1 tablet orally daily     ALLYSON (generalized anxiety disorder)

## 2018-06-05 NOTE — PATIENT INSTRUCTIONS
Treating Anxiety Disorders with Medicine  An anxiety disorder can make you feel nervous or apprehensive, even without a clear reason. In people age 65 and older, generalized anxiety disorder is one of the most commonly diagnosed anxiety disorders. Many times it occurs with depression. Certain anxiety disorders can cause intense feelings of fear or panic. You may even have physical symptoms such as a racing heartbeat, sweating, or dizziness. If you have these feelings, you don t have to suffer anymore. Treatment to help you overcome your fears will likely include therapy (also called counseling). Medicine may also be prescribed to help control your symptoms.    Medicines  Certain medicines may be prescribed to help control your symptoms. So you may feel less anxious. You may also feel able to move forward with therapy. At first, medicines and dosages may need to be adjusted to find what works best for you. Try to be patient. Tell your healthcare provider how a medicine makes you feel. This way, you can work together to find the treatment that s best for you. Keep in mind that medicines can have side effects. Talk with your provider about any side effects that are bothering you. Changing the dose or type of medicine may help. Don t stop taking medicine on your own. That can cause symptoms to come back.    Anti-anxiety medicine. This medicine eases symptoms and helps you relax. Your healthcare provider will explain when and how to use it. It may be prescribed for use before situations that make you anxious. You may also be told to take medicine on a regular schedule. Anti-anxiety medicine may make you feel a little sleepy or  out of it.  Don t drive a car or operate machinery while on this medicine, until you know how it affects you.  Caution  Never use alcohol or other drugs with anti-anxiety medicines. This could result in loss of muscular control, sedation, coma, or death. Also, use only the amount of medicine  prescribed for you. If you think you may have taken too much, get emergency care right away.     Antidepressant medicine. This kind of medicine is often used to treat anxiety, even if you aren t depressed. An antidepressant helps balance out brain chemicals. This helps keep anxiety under control. This medicine is taken on a schedule. It takes a few weeks to start working. If you don t notice a change at first, you may just need more time. But if you don t notice results after the first few weeks, tell your provider.  Keep taking medicines as prescribed  Never change your dosage, share or use another person's medicine, or stop taking your medicines without talking to your healthcare provider first. Keep the following in mind:    Some medicines must be taken on a schedule. Make this part of your daily routine. For instance, always take your pill before brushing your teeth. A pillbox can help you remember if you ve taken your medicine each day.    Medicines are often taken for 6 to 12 months. Your healthcare provider will then evaluate whether you need to stay on them. Many people who have also had therapy may no longer need medicine to manage anxiety.    You may need to stop taking medicine slowly to give your body time to adjust. When it s time to stop, your healthcare provider will tell you more. Remember: Never stop taking your medicine without talking to your provider first.    If symptoms return, you may need to start taking medicines again. This isn t your fault. It s just the nature of your anxiety disorder.  Special concerns    Side effects. Medicines may cause side effects. Ask your healthcare provider or pharmacist what you can expect. They may have ideas for avoiding some side effects.    Sexual problems. Some antidepressants can affect your desire for sex or your ability to have an orgasm. A change in dosage or medicine often solves the problem. If you have a sexual side effect that concerns you, tell your  healthcare provider.    Addiction. If you ve never had a problem with drugs or alcohol, you may not have a problem with medicines used to treat anxiety disorders. But always discuss the medicines with your healthcare provider before taking them. If you have a history of addiction, you may not be able to use certain medicines used to treat anxiety disorders.    Medicine interactions. Always check with your pharmacist before using any over-the-counter medicines, including herbal supplements.   Date Last Reviewed: 5/1/2017 2000-2017 [employer]. [address] [place] [address]. All rights reserved. This information is not intended as a substitute for professional medical care. Always follow your healthcare professional's instructions.

## 2018-06-06 ASSESSMENT — ANXIETY QUESTIONNAIRES: GAD7 TOTAL SCORE: 12

## 2018-07-17 ENCOUNTER — OFFICE VISIT (OUTPATIENT)
Dept: FAMILY MEDICINE | Facility: CLINIC | Age: 28
End: 2018-07-17
Payer: COMMERCIAL

## 2018-07-17 VITALS
HEIGHT: 64 IN | TEMPERATURE: 98 F | WEIGHT: 161 LBS | DIASTOLIC BLOOD PRESSURE: 70 MMHG | SYSTOLIC BLOOD PRESSURE: 100 MMHG | HEART RATE: 80 BPM | BODY MASS INDEX: 27.49 KG/M2

## 2018-07-17 DIAGNOSIS — F41.1 GAD (GENERALIZED ANXIETY DISORDER): ICD-10-CM

## 2018-07-17 PROCEDURE — 99213 OFFICE O/P EST LOW 20 MIN: CPT | Performed by: FAMILY MEDICINE

## 2018-07-17 RX ORDER — ESCITALOPRAM OXALATE 20 MG/1
20 TABLET ORAL DAILY
Qty: 90 TABLET | Refills: 1 | Status: SHIPPED | OUTPATIENT
Start: 2018-07-17 | End: 2019-06-03

## 2018-07-17 ASSESSMENT — PATIENT HEALTH QUESTIONNAIRE - PHQ9: 5. POOR APPETITE OR OVEREATING: NOT AT ALL

## 2018-07-17 ASSESSMENT — ANXIETY QUESTIONNAIRES
GAD7 TOTAL SCORE: 4
5. BEING SO RESTLESS THAT IT IS HARD TO SIT STILL: NOT AT ALL
2. NOT BEING ABLE TO STOP OR CONTROL WORRYING: SEVERAL DAYS
7. FEELING AFRAID AS IF SOMETHING AWFUL MIGHT HAPPEN: NOT AT ALL
1. FEELING NERVOUS, ANXIOUS, OR ON EDGE: SEVERAL DAYS
6. BECOMING EASILY ANNOYED OR IRRITABLE: SEVERAL DAYS
IF YOU CHECKED OFF ANY PROBLEMS ON THIS QUESTIONNAIRE, HOW DIFFICULT HAVE THESE PROBLEMS MADE IT FOR YOU TO DO YOUR WORK, TAKE CARE OF THINGS AT HOME, OR GET ALONG WITH OTHER PEOPLE: SOMEWHAT DIFFICULT
3. WORRYING TOO MUCH ABOUT DIFFERENT THINGS: SEVERAL DAYS

## 2018-07-17 NOTE — NURSING NOTE
"  Chief Complaint   Patient presents with     Anxiety     F/U       Initial /70 (BP Location: Right arm, Patient Position: Sitting, Cuff Size: Adult Regular)  Pulse 80  Temp 98  F (36.7  C) (Oral)  Ht 5' 4\" (1.626 m)  Wt 161 lb (73 kg)  Breastfeeding? Yes  BMI 27.64 kg/m2 Estimated body mass index is 27.64 kg/(m^2) as calculated from the following:    Height as of this encounter: 5' 4\" (1.626 m).    Weight as of this encounter: 161 lb (73 kg).  Medication Reconciliation: complete      Health Maintenance addressed:  Up to date    Manuel Sheffield CMA        "

## 2018-07-17 NOTE — PROGRESS NOTES
SUBJECTIVE:   Paulo Lema is a 27 year old female who presents to clinic today for the following health issues:      Anxiety Follow-Up    Status since last visit: Improved     Other associated symptoms:Still some anxiety    Complicating factors:   Significant life event: No   Current substance abuse: None  Depression symptoms: No  ALLYSON-7 SCORE 2018   Total Score 12       Amount of exercise or physical activity: None    Problems taking medications regularly: No    Medication side effects: fatigue    Diet: regular (no restrictions)      Taking 20 mg lexapro daily, working well for her.   Doesn't want to make any changes.   No side effects.       Problem list and histories reviewed & adjusted, as indicated.  Additional history: none    Patient Active Problem List   Diagnosis     High risk pregnancy in young primigravida     S/P primary low transverse      Gallstone pancreatitis     ALLYSON (generalized anxiety disorder)     Past Surgical History:   Procedure Laterality Date      SECTION N/A 2017    Procedure:  SECTION;   section;  Surgeon: Blanca Villarreal MD;  Location:  L+D     LAPAROSCOPIC CHOLECYSTECTOMY WITH CHOLANGIOGRAMS N/A 2017    Procedure: LAPAROSCOPIC CHOLECYSTECTOMY WITH CHOLANGIOGRAMS;  LAPAROSCOPIC CHOLECYSTECTOMY WITH CHOLANGIOGRAMS ;  Surgeon: Mary Conroy MD;  Location:  OR       Social History   Substance Use Topics     Smoking status: Never Smoker     Smokeless tobacco: Never Used     Alcohol use No     Family History   Problem Relation Age of Onset     Family History Negative Mother            Reviewed and updated as needed this visit by clinical staff       Reviewed and updated as needed this visit by Provider         ROS:  Constitutional, HEENT, cardiovascular, pulmonary, gi and gu systems are negative, except as otherwise noted.    OBJECTIVE:     /70 (BP Location: Right arm, Patient Position: Sitting, Cuff Size: Adult  "Regular)  Pulse 80  Temp 98  F (36.7  C) (Oral)  Ht 5' 4\" (1.626 m)  Wt 161 lb (73 kg)  Breastfeeding? Yes  BMI 27.64 kg/m2  Body mass index is 27.64 kg/(m^2).  GENERAL: healthy, alert and no distress  PSYCH: mentation appears normal, affect normal/bright    Diagnostic Test Results:  ALLYSON-7 SCORE 6/5/2018 7/17/2018   Total Score 12 4         ASSESSMENT/PLAN:     1. ALLYSON (generalized anxiety disorder) - improved, continue current, recheck 6 months  - escitalopram (LEXAPRO) 20 MG tablet; Take 1 tablet (20 mg) by mouth daily Take 1/2 tablet (10 mg) for 1-2 weeks, then increase to 1 tablet orally daily  Dispense: 90 tablet; Refill: 1    Sharonda Engel MD  [facility]    "

## 2018-07-17 NOTE — MR AVS SNAPSHOT
"              After Visit Summary   2018    Paulo Lema    MRN: [id number]           Patient Information     Date Of Birth          1990        Visit Information        Provider Department      2018 8:00 AM Sharonda Engel MD [facility]        Today's Diagnoses     ALLYSON (generalized anxiety disorder)           Follow-ups after your visit        Follow-up notes from your care team     Return in about 6 months (around 2019) for Depression/Anxiety Recheck.      Who to contact     If you have questions or need follow up information about today's clinic visit or your schedule please contact [facility] directly at [phone].  Normal or non-critical lab and imaging results will be communicated to you by MyChart, letter or phone within 4 business days after the clinic has received the results. If you do not hear from us within 7 days, please contact the clinic through MyChart or phone. If you have a critical or abnormal lab result, we will notify you by phone as soon as possible.  Submit refill requests through [employer] or call your pharmacy and they will forward the refill request to us. Please allow 3 business days for your refill to be completed.          Additional Information About Your Visit        MyChart Information     [employer] lets you send messages to your doctor, view your test results, renew your prescriptions, schedule appointments and more. To sign up, go to [email or web address] . Click on \"Log in\" on the left side of the screen, which will take you to the Welcome page. Then click on \"Sign up Now\" on the right side of the page.     You will be asked to enter the access code listed below, as well as some personal information. Please follow the directions to create your username and password.     Your access code is: [email or web address]  Expires: 9/3/2018  9:42 AM     Your access code will  in 90 days. If you need help or a new code, please call " "your [place] clinic or [phone].        Care EveryWhere ID     This is your Care EveryWhere ID. This could be used by other organizations to access your [place] medical records  [id number]        Your Vitals Were     Pulse Temperature Height Breastfeeding? BMI (Body Mass Index)       80 98  F (36.7  C) (Oral) 5' 4\" (1.626 m) Yes 27.64 kg/m2        Blood Pressure from Last 3 Encounters:   07/17/18 100/70   06/05/18 110/70   01/03/18 116/74    Weight from Last 3 Encounters:   07/17/18 161 lb (73 kg)   06/05/18 161 lb 6.4 oz (73.2 kg)   01/03/18 182 lb (82.6 kg)              Today, you had the following     No orders found for display         Today's Medication Changes          These changes are accurate as of 7/17/18  8:56 AM.  If you have any questions, ask your nurse or doctor.               These medicines have changed or have updated prescriptions.        Dose/Directions    escitalopram 20 MG tablet   Commonly known as:  LEXAPRO   This may have changed:    - how much to take  - how to take this  - when to take this   Used for:  ALLYSON (generalized anxiety disorder)   Changed by:  Sharnoda Engel MD        Dose:  20 mg   Take 1 tablet (20 mg) by mouth daily Take 1/2 tablet (10 mg) for 1-2 weeks, then increase to 1 tablet orally daily   Quantity:  90 tablet   Refills:  1            Where to get your medicines      These medications were sent to [place] [id number] [address]    Hours:  Tech issues with their phone system Phone:  [phone]     escitalopram 20 MG tablet                Primary Care Provider Office Phone # Fax #    Disha Tsang [phone]       [address]  [facility] [address]        Equal Access to Services     CARROL RODRIGUEZ AH: Anel irvingo Soelpidio, waaxda luqadaha, qaybta kaalmada adeegyada, waxay mari soriano. So [facility] [phone].    ATENCIÓN: Si habla " español, tiene a golden disposición servicios gratuitos de asistencia lingüística. Kristine mtz [phone].    We comply with applicable federal civil rights laws and [place] laws. We do not discriminate on the basis of race, color, national origin, age, disability, sex, sexual orientation, or gender identity.            Thank you!     Thank you for choosing [facility]  for your care. Our goal is always to provide you with excellent care. Hearing back from our patients is one way we can continue to improve our services. Please take a few minutes to complete the written survey that you may receive in the mail after your visit with us. Thank you!             Your Updated Medication List - Protect others around you: Learn how to safely use, store and throw away your medicines at www.disposemymeds.org.          This list is accurate as of 7/17/18  8:56 AM.  Always use your most recent med list.                   Brand Name Dispense Instructions for use Diagnosis    cholecalciferol 1000 UNIT tablet    vitamin D3    100 tablet    Take 1 tablet (1,000 Units) by mouth daily        escitalopram 20 MG tablet    LEXAPRO    90 tablet    Take 1 tablet (20 mg) by mouth daily Take 1/2 tablet (10 mg) for 1-2 weeks, then increase to 1 tablet orally daily    ALLYSON (generalized anxiety disorder)

## 2018-07-18 ASSESSMENT — ANXIETY QUESTIONNAIRES: GAD7 TOTAL SCORE: 4

## 2018-07-18 ASSESSMENT — PATIENT HEALTH QUESTIONNAIRE - PHQ9: SUM OF ALL RESPONSES TO PHQ QUESTIONS 1-9: 2

## 2018-09-21 NOTE — NURSING NOTE
"Chief Complaint   Patient presents with     Prenatal Care     21+6       Initial /60  Pulse 108  Ht 5' 3\" (1.6 m)  Wt 181 lb 1.6 oz (82.1 kg)  LMP 02/10/2017 (Exact Date)  BMI 32.08 kg/m2 Estimated body mass index is 32.08 kg/(m^2) as calculated from the following:    Height as of this encounter: 5' 3\" (1.6 m).    Weight as of this encounter: 181 lb 1.6 oz (82.1 kg).  BP completed using cuff size: regular        The following HM Due: NONE      The following patient reported/Care Every where data was sent to:  P ABSTRACT QUALITY INITIATIVES [[id number]]  NA     patient has appointment for today    Glendy LUNA               "
Awake

## 2019-05-22 ENCOUNTER — ANCILLARY PROCEDURE (OUTPATIENT)
Dept: ULTRASOUND IMAGING | Facility: CLINIC | Age: 29
End: 2019-05-22
Payer: COMMERCIAL

## 2019-05-22 DIAGNOSIS — Z34.90 SUPERVISION OF NORMAL PREGNANCY: ICD-10-CM

## 2019-05-22 DIAGNOSIS — Z34.90 SUPERVISION OF NORMAL PREGNANCY: Primary | ICD-10-CM

## 2019-05-22 PROCEDURE — 76815 OB US LIMITED FETUS(S): CPT | Performed by: OBSTETRICS & GYNECOLOGY

## 2019-05-22 PROCEDURE — 76817 TRANSVAGINAL US OBSTETRIC: CPT | Performed by: OBSTETRICS & GYNECOLOGY

## 2019-06-03 ENCOUNTER — PRENATAL OFFICE VISIT (OUTPATIENT)
Dept: NURSING | Facility: CLINIC | Age: 29
End: 2019-06-03
Payer: COMMERCIAL

## 2019-06-03 VITALS
BODY MASS INDEX: 26.79 KG/M2 | HEIGHT: 63 IN | SYSTOLIC BLOOD PRESSURE: 124 MMHG | DIASTOLIC BLOOD PRESSURE: 72 MMHG | WEIGHT: 151.2 LBS

## 2019-06-03 DIAGNOSIS — Z34.90 SUPERVISION OF NORMAL PREGNANCY: Primary | ICD-10-CM

## 2019-06-03 LAB
ABO + RH BLD: NORMAL
ABO + RH BLD: NORMAL
BLD GP AB SCN SERPL QL: NORMAL
BLOOD BANK CMNT PATIENT-IMP: NORMAL
ERYTHROCYTE [DISTWIDTH] IN BLOOD BY AUTOMATED COUNT: 12.4 % (ref 10–15)
HCT VFR BLD AUTO: 36.7 % (ref 35–47)
HGB BLD-MCNC: 12.2 G/DL (ref 11.7–15.7)
MCH RBC QN AUTO: 30 PG (ref 26.5–33)
MCHC RBC AUTO-ENTMCNC: 33.2 G/DL (ref 31.5–36.5)
MCV RBC AUTO: 90 FL (ref 78–100)
PLATELET # BLD AUTO: 268 10E9/L (ref 150–450)
RBC # BLD AUTO: 4.06 10E12/L (ref 3.8–5.2)
SPECIMEN EXP DATE BLD: NORMAL
WBC # BLD AUTO: 6.3 10E9/L (ref 4–11)

## 2019-06-03 PROCEDURE — 86901 BLOOD TYPING SEROLOGIC RH(D): CPT | Performed by: OBSTETRICS & GYNECOLOGY

## 2019-06-03 PROCEDURE — 86780 TREPONEMA PALLIDUM: CPT | Performed by: OBSTETRICS & GYNECOLOGY

## 2019-06-03 PROCEDURE — 85027 COMPLETE CBC AUTOMATED: CPT | Performed by: OBSTETRICS & GYNECOLOGY

## 2019-06-03 PROCEDURE — 87086 URINE CULTURE/COLONY COUNT: CPT | Performed by: OBSTETRICS & GYNECOLOGY

## 2019-06-03 PROCEDURE — 86900 BLOOD TYPING SEROLOGIC ABO: CPT | Performed by: OBSTETRICS & GYNECOLOGY

## 2019-06-03 PROCEDURE — 86850 RBC ANTIBODY SCREEN: CPT | Performed by: OBSTETRICS & GYNECOLOGY

## 2019-06-03 PROCEDURE — 87389 HIV-1 AG W/HIV-1&-2 AB AG IA: CPT | Performed by: OBSTETRICS & GYNECOLOGY

## 2019-06-03 PROCEDURE — 87340 HEPATITIS B SURFACE AG IA: CPT | Performed by: OBSTETRICS & GYNECOLOGY

## 2019-06-03 PROCEDURE — 99207 ZZC NO CHARGE NURSE ONLY: CPT

## 2019-06-03 PROCEDURE — 87491 CHLMYD TRACH DNA AMP PROBE: CPT | Performed by: OBSTETRICS & GYNECOLOGY

## 2019-06-03 PROCEDURE — 36415 COLL VENOUS BLD VENIPUNCTURE: CPT | Performed by: OBSTETRICS & GYNECOLOGY

## 2019-06-03 PROCEDURE — 87591 N.GONORRHOEAE DNA AMP PROB: CPT | Performed by: OBSTETRICS & GYNECOLOGY

## 2019-06-03 PROCEDURE — 86762 RUBELLA ANTIBODY: CPT | Performed by: OBSTETRICS & GYNECOLOGY

## 2019-06-03 RX ORDER — PNV NO.95/FERROUS FUM/FOLIC AC 28MG-0.8MG
TABLET ORAL
COMMUNITY
End: 2022-05-23

## 2019-06-03 ASSESSMENT — MIFFLIN-ST. JEOR: SCORE: 1389.97

## 2019-06-03 NOTE — NURSING NOTE
Patient is accompanied by self. Prenatal book and folder (containing standard educational hand-outs and brochures) given to patient. Information in folder reviewed. Questions answered. Brochure given on optional screening available to assess chromosomal anomalies. Pt advised to call the clinic if she has any questions or concerns related to her pregnancy. Prenatal labs obtained. New prenatal visit scheduled on 6/20/19 with Dr. Tsang.    8w5d    Lab Results   Component Value Date    PAP NIL 04/14/2017           Patient supplied answers from flow sheet for:  Prenatal OB Questionnaire.  Past Medical History  Diabetes?: (!) Yes(gestational diabetes with first pregnancy)  Hypertension : No  Heart disease, mitral valve prolapse or rheumatic fever?: No  An autoimmune disease such as lupus or rheumatoid arthritis?: No  Kidney disease or urinary tract infection?: No  Epilepsy, seizures or spells?: No  Migraine headaches?: No  A stroke or loss of function or sensation?: No  Any other neurological problems?: No  Have you ever been treated for depression?: (!) Yes(anxiety-age 25)  Are you having problems with crying spells or loss of self-esteem?: No  Have you ever required psychiatric care?: No  Have you ever had hepatitis, liver disease or jaundice?: No  Have you been treated for blood clots in your veins, deep vein thromosis, inflammation in the veins, thrombosis, phlebitis, pulmonary embolism or varicosities?: No  Have you had excessive bleeding after surgery or dental work?: No  Do you bleed more than other women after a cut or scratch?: No  Do you have a history of anemia?: No  Have you ever had thyroid problems or taken thyroid medication?: No   Do you have any endocrine problems?: No  Have you ever been in a major accident or suffered serious trauma?: No  Within the last year, has anyone hit, slapped, kicked or otherwise hurt you?: No  In the last year, has anyone forced you to have sex when you didn't want to?:  No    Past Medical History 2   Have you ever received a blood transfusion?: No  Would you refuse a blood transfusion if a doctor judged it to be medically necessary?: No   If you answered Yes, would you rather die than receive a blood transfusion?: No  If you answered Yes, is this for Episcopal reasons?: No  Does anyone in your home smoke?: No  Do you use tobacco products?: No  Do you drink beer, wine or hard liquor?: No  Do you use any of the following: marijuana, speed, cocaine, heroin, hallucinogens or other drugs?: No   Is your blood type Rh negative?: No  Have you ever had abnormal antibodies in your blood?: No  Have you ever had asthma?: No  Have you ever had tuberculosis?: No  Do you have any allergies to drugs or over-the-counter medications?: (!) Yes(reglan)  Allergies: Dust Mites, Aspartame, Ethanol, Venlafaxine, Hydrochloride, Sertraline: No  Have you had any breast problems?: No  Have you ever ?: (!) Yes  Have you had any gynecological surgical procedures such as cervical conization, a LEEP procedure, laser treatment, cryosurgery of the cervix or a dilation and curettage, etc?: No  Have you ever had any other surgical procedures?: (!) Yes(, gallbladder)  Have you been hospitalized for a nonsurgical reason excluding normal delivery?: (!) Yes(during first pregnancy-dehydration)  Have you ever had any anesthetic complications?: No  Have you ever had an abnormal pap smear?: No    Past Medical History (Continued)  Do you have a history of abnormalities of the uterus?: No  Did your mother take WINDY or any other hormones when she was pregnant with you?: No  Did it take you more than a year to become pregnant?: No  Have you ever been evaluated or treated for infertility?: No  Is there a history of medical problems in your family, which you feel may be important to this pregnancy?: No  Do you have any other problems we have not asked about which you feel may be important to this pregnancy?:  No    Symptoms since last menstrual period  Do you have any of the following symptoms: abdominal pain, blood in stools or urine, chest pain, shortness of breath, coughing or vomiting up blood, your heart racing or skipping beats, nausea and vomiting, pain on urination or vaginal discharge or bleed: (!) Yes(nausea and vomiting)  Current medications, including over-the-counter medications, you are using? (If not applicable answer none): pn vitamin  Will the patient be 35 years old or older at the time of delivery?: No    Has the patient, baby's father or anyone in either family had:  Thalassemia (Italian, Greek, Mediterranean or  background only) and an MCV result less than 80?: No  Neural tube defect such as meningomyelocele, spina bifida or anencephaly?: No  Congenital heart defect?: No  Down's Syndrome?: No  Gregorio-Sachs disease (Hindu, Cajun, Latvian-[place])?: No  Sickle cell disease or trait ()?: No  Hemophilia or other inherited problems of blood?: No  Muscular dystrophy?: No  Cystic fibrosis?: No  Mikhail's chorea?: No  Mental retardation/autism?: No  If yes, was the person tested for fragile X?: No  Any other inherited genetic or chromosomal disorder?: No  Maternal metabolic disorder (e.g Insulin-dependent diabetes, PKU)?: No  A child with birth defects not listed above?: No  Recurrent pregnancy loss or stillbirth?: No   Has the patient had any medications/street drugs/alcohol since her last menstrual period?: No  Does the patient or baby's father have any other genetic risks?: No    Infection History   Do you object to being tested for Hepatitis B?: No  Do you object to being tested for HIV?: No   Do you feel that you are at high risk for coming in contact with the AIDS virus?: No  Have you ever been treated for tuberculosis?: No  Have you ever had a positive skin test for tuberculosis?: No  Do you live with someone who has tuberculosis?: No  Have you ever been exposed to tuberculosis?: No  Do  you have genital herpes?: No  Does your partner have genital herpes?: No  Have you had a viral illness since your last period?: No  Have you ever had gonorrhea, chlamydia, syphilis, venereal warts, trichomoniasis, pelvic inflammatory disease or any other sexually transmitted disease?: No  Do you know if you are a genital group B streptococcus carrier?: No  Have you had chicken pox/varicella?: No   Have you been vaccinated against chicken Pox?: No  Have you had any other infectious diseases?: No

## 2019-06-04 LAB
BACTERIA SPEC CULT: NORMAL
C TRACH DNA SPEC QL NAA+PROBE: NEGATIVE
HBV SURFACE AG SERPL QL IA: NONREACTIVE
HIV 1+2 AB+HIV1 P24 AG SERPL QL IA: NONREACTIVE
N GONORRHOEA DNA SPEC QL NAA+PROBE: NEGATIVE
RUBV IGG SERPL IA-ACNC: 20 IU/ML
SPECIMEN SOURCE: NORMAL
T PALLIDUM AB SER QL: NONREACTIVE

## 2019-06-20 ENCOUNTER — PRENATAL OFFICE VISIT (OUTPATIENT)
Dept: OBGYN | Facility: CLINIC | Age: 29
End: 2019-06-20
Payer: COMMERCIAL

## 2019-06-20 VITALS
DIASTOLIC BLOOD PRESSURE: 66 MMHG | WEIGHT: 156 LBS | SYSTOLIC BLOOD PRESSURE: 114 MMHG | BODY MASS INDEX: 26.63 KG/M2 | HEIGHT: 64 IN

## 2019-06-20 DIAGNOSIS — Z34.80 SUPERVISION OF OTHER NORMAL PREGNANCY, ANTEPARTUM: Primary | ICD-10-CM

## 2019-06-20 PROCEDURE — 99207 ZZC FIRST OB VISIT: CPT | Performed by: OBSTETRICS & GYNECOLOGY

## 2019-06-20 ASSESSMENT — MIFFLIN-ST. JEOR: SCORE: 1427.61

## 2019-06-20 NOTE — PROGRESS NOTES
This is a 28 year old female patient,   who presents for her first obstetrical visit. This pregnancy is Planned, Desired.    EDC 2020 by LMP and Previous US which makes her 11w1d  today.  Her cycles are regular.  Her last menstrual period was normal. Since her LMP, she has experienced  nausea and emesis.  She denies pelvic pain and vaginal bleeding. Ultrasound in the 1st trimester showed EDC consistent with dates by LMP.     OB History    Para Term  AB Living   2 1 1 0 0 1   SAB TAB Ectopic Multiple Live Births   0 0 0 0 1      # Outcome Date GA Lbr González/2nd Weight Sex Delivery Anes PTL Lv   2 Current            1 Term 17 39w1d  3.5 kg (7 lb 11.5 oz) F CS-LTranv EPI N TAYLOR      Complications: Failure to Progress in First Stage, Gestational diabetes      Name: Katja Bhakta      Apgar1: 9  Apgar5: 9       History of GDM: Yes - on insulin, passed 2 hr glucose tolerance test after 6w postpartum   PTL : No,  History of HTN in pregnancy: No,  Thrombocytopenia: No,  Shoulder dystocia: No,  Vacuum Extraction: No  PPH: No   3rd of 4th degree laceration: No.   Other complications: Yes - low transverse  with 2 layer closure, for FTP at 5.5 cm. Op note reviewed.      Since her last LMP she denies use of alcohol, tobacco and street drugs.    HISTORY:  Past Medical History:   Diagnosis Date     Diabetes (H)     w/ first pregnancy-on insulin     Past Surgical History:   Procedure Laterality Date      SECTION N/A 2017    Procedure:  SECTION;   section;  Surgeon: Blanca Villarreal MD;  Location: [facility] L[facility]D     LAPAROSCOPIC CHOLECYSTECTOMY WITH CHOLANGIOGRAMS N/A 2017    Procedure: LAPAROSCOPIC CHOLECYSTECTOMY WITH CHOLANGIOGRAMS;  LAPAROSCOPIC CHOLECYSTECTOMY WITH CHOLANGIOGRAMS ;  Surgeon: Mary Conroy MD;  Location: [facility] OR     Family History   Problem Relation Age of Onset     Family History Negative Mother      Colon Cancer Father      Liver  "Cancer Maternal Grandfather      Social History     Socioeconomic History     Marital status:      Spouse name: Brennan     Number of children: 1     Years of education: None     Highest education level: None   Occupational History     None   Social Needs     Financial resource strain: None     Food insecurity:     Worry: None     Inability: None     Transportation needs:     Medical: None     Non-medical: None   Tobacco Use     Smoking status: Never Smoker     Smokeless tobacco: Never Used   Substance and Sexual Activity     Alcohol use: No     Drug use: No     Sexual activity: Yes     Partners: Male   Lifestyle     Physical activity:     Days per week: None     Minutes per session: None     Stress: None   Relationships     Social connections:     Talks on phone: None     Gets together: None     Attends Nondenominational service: None     Active member of club or organization: None     Attends meetings of clubs or organizations: None     Relationship status: None     Intimate partner violence:     Fear of current or ex partner: None     Emotionally abused: None     Physically abused: None     Forced sexual activity: None   Other Topics Concern     Parent/sibling w/ CABG, MI or angioplasty before 65F 55M? Not Asked   Social History Narrative     None     Current Outpatient Medications   Medication Sig     Prenatal Vit-Fe Fumarate-FA (PRENATAL VITAMIN) 27-0.8 MG TABS      No current facility-administered medications for this visit.      Allergies   Allergen Reactions     Reglan [Metoclopramide] Other (See Comments)     Light headed       Past medical, surgical, social and family history were reviewed and updated in EPIC.    ROS:   12 point review of systems negative other than symptoms noted below.    EXAM:  /66   Ht 1.626 m (5' 4\")   Wt 70.8 kg (156 lb)   LMP 04/03/2019 (Exact Date)   BMI 26.78 kg/m     BMI: Body mass index is 26.78 kg/m .    EXAM:  Constitutional: Appearance: Well nourished, well developed " alert, in no acute distress  Chest:  Respiratory Effort:  Breathing unlabored  Cardiovascular:Heart    Auscultation:  Regular rate, normal rhythm, no murmurs present  Gastrointestinal:  Abdominal Examination:  Abdomen nontender to palpation, tone normal without     rigidity or guarding, no masses present, umbilicus without lesions    Liver and speen:  No hepatomegaly present, liver nontender to palpation    Hernias:  No hernias present    FHTs could not be ascertained with doppler, so US was undertaken.     Early obstetric transabdominal ultrasound. Living intrauterine pregnancy is seen.     Corresponding sonographic and menstrual EGA and EDC.   Normal amniotic fluid seen. Cardiac activity seen and documented at 134 bpm      Skin:  General Inspection:  No rashes present, no lesions present, no areas of  discoloration.  Neurologic/Psychiatric:    Mental Status:  Oriented X3       Pelvis: not needed today.    ASSESSMENT:      ICD-10-CM    1. Supervision of other normal pregnancy, antepartum Z34.80        PLAN:    Prenatal labs reviewed. She has no questions.    Discussed options for screening for and diagnosis of chromosomal anomalies, including first trimester screen, noninvasive prenatal testing/cell-free fetal DNA testing, CVS/amniocentesis, quad screen, and ultrasound or comprehensive Level II US at 18-20 weeks. She is electing ultrasound at 18-20 weeks.    Due to prior history of GDMA2, plan early GCT at 18-20w then rescreen at 28 if normal.    Risks and benefits of trial of labor after  section versus elective repeat  section were explained. This includes an approximate  0.5% risk of uterine rupture with a subsequent risk of fetal death or brain damage or significant maternal morbidity or death. I also explained risks of bleeding, infection, and potential damage to other organs associated with a repeat .The highest risk is for women who plan a TOLAC but then require  in labor.  If a trial of labor is chosen, we discussed the need for continuous monitoring and IV access in active labor.  We also discussed the increased risks of induction of labor and postdates status, and the need to consider repeat  if she were to become post dates at 41 weeks without a favorable cervix.     Currently she is planning on scheduling repeat  at 39 weeks but might consider TOLAC if she labors prior to this. Operative note reviewed and this plan is appropriate.    Reviewed early pregnancy education, diet, exercise, prenatal vitamins, intercourse. Reviewed the call schedule, labor and delivery, and the schedule of prenatal visits.    Follow up in 4 weeks. She is encouraged to call sooner with questions or concerns.      Disha Tsang MD

## 2019-07-16 ENCOUNTER — PRENATAL OFFICE VISIT (OUTPATIENT)
Dept: OBGYN | Facility: CLINIC | Age: 29
End: 2019-07-16
Payer: COMMERCIAL

## 2019-07-16 VITALS — BODY MASS INDEX: 26.78 KG/M2 | WEIGHT: 156 LBS | DIASTOLIC BLOOD PRESSURE: 80 MMHG | SYSTOLIC BLOOD PRESSURE: 120 MMHG

## 2019-07-16 DIAGNOSIS — Z34.80 SUPERVISION OF OTHER NORMAL PREGNANCY, ANTEPARTUM: Primary | ICD-10-CM

## 2019-07-16 PROCEDURE — 99207 ZZC PRENATAL VISIT: CPT | Performed by: OBSTETRICS & GYNECOLOGY

## 2019-07-16 NOTE — NURSING NOTE
"Chief Complaint   Patient presents with     Prenatal Care       Initial /80   Wt 70.8 kg (156 lb)   LMP 2019 (Exact Date)   Breastfeeding? No   BMI 26.78 kg/m   Estimated body mass index is 26.78 kg/m  as calculated from the following:    Height as of 19: 1.626 m (5' 4\").    Weight as of this encounter: 70.8 kg (156 lb).  BP completed using cuff size: regular    Questioned patient about current smoking habits.  Pt. has never smoked.          14w6d  - headache  - cramping or bleeding  + mild heartburn  + nausea and vomiting occas  Sharonda Au LPN               "

## 2019-07-16 NOTE — PROGRESS NOTES
PROBLEM LIST  LABS: Apos/RI    1. Prior low transverse : plans repeat at 39w, may TOLAC if labors prior  2. History GDMA2: plan early GCT at 18-20w    Feeling much better. Discussed glucola next time. Follow up in 4 weeks, US for anatomy then. Declines genetic screening.    Disha Tsang MD

## 2019-07-19 ENCOUNTER — NURSE TRIAGE (OUTPATIENT)
Dept: NURSING | Facility: CLINIC | Age: 29
End: 2019-07-19

## 2019-07-19 NOTE — TELEPHONE ENCOUNTER
"Patient states she is 15 weeks pregnant and for the past week she has had \"super bad lower abdominal pain at my groin\"; pain is intermittent.  FNA connected patient with Jayla, via backline at clinic, who will have a nurse speak with patient.  "

## 2019-08-15 ENCOUNTER — PRENATAL OFFICE VISIT (OUTPATIENT)
Dept: OBGYN | Facility: CLINIC | Age: 29
End: 2019-08-15
Payer: COMMERCIAL

## 2019-08-15 VITALS — WEIGHT: 161 LBS | SYSTOLIC BLOOD PRESSURE: 120 MMHG | DIASTOLIC BLOOD PRESSURE: 60 MMHG | BODY MASS INDEX: 27.64 KG/M2

## 2019-08-15 DIAGNOSIS — Z34.80 SUPERVISION OF OTHER NORMAL PREGNANCY, ANTEPARTUM: Primary | ICD-10-CM

## 2019-08-15 PROCEDURE — 99207 ZZC PRENATAL VISIT: CPT | Performed by: OBSTETRICS & GYNECOLOGY

## 2019-08-15 NOTE — NURSING NOTE
"Chief Complaint   Patient presents with     Prenatal Care       Initial /60   Wt 73 kg (161 lb)   LMP 2019 (Exact Date)   Breastfeeding? No   BMI 27.64 kg/m   Estimated body mass index is 27.64 kg/m  as calculated from the following:    Height as of 19: 1.626 m (5' 4\").    Weight as of this encounter: 73 kg (161 lb).  BP completed using cuff size: regular    Questioned patient about current smoking habits.  Pt. has never smoked.          19w1d  ? FM noted - anterior placenta  - cramping or bleeding  - vomiting  + mild heartburn  Sharonda Au LPN               "

## 2019-08-15 NOTE — PROGRESS NOTES
PROBLEM LIST  LABS: Apos/RI    1. Prior low transverse : plans repeat at 39w, may TOLAC if labors prior  2. History GDMA2: plan early GCT at 18-20w    Feeling much better. Discussed glucola before or for next time. Follow up in 4 weeks, US for anatomy scheduled.     Disha Tsang MD

## 2019-08-26 ENCOUNTER — ALLIED HEALTH/NURSE VISIT (OUTPATIENT)
Dept: NURSING | Facility: CLINIC | Age: 29
End: 2019-08-26
Payer: COMMERCIAL

## 2019-08-26 ENCOUNTER — ANCILLARY PROCEDURE (OUTPATIENT)
Dept: ULTRASOUND IMAGING | Facility: CLINIC | Age: 29
End: 2019-08-26
Attending: OBSTETRICS & GYNECOLOGY
Payer: COMMERCIAL

## 2019-08-26 DIAGNOSIS — Z34.80 SUPERVISION OF OTHER NORMAL PREGNANCY, ANTEPARTUM: ICD-10-CM

## 2019-08-26 DIAGNOSIS — Z34.90 SUPERVISION OF NORMAL PREGNANCY: Primary | ICD-10-CM

## 2019-08-26 PROCEDURE — 82950 GLUCOSE TEST: CPT | Performed by: OBSTETRICS & GYNECOLOGY

## 2019-08-26 PROCEDURE — 99207 ZZC NO CHARGE NURSE ONLY: CPT

## 2019-08-26 PROCEDURE — 76805 OB US >/= 14 WKS SNGL FETUS: CPT | Performed by: OBSTETRICS & GYNECOLOGY

## 2019-08-26 PROCEDURE — 36415 COLL VENOUS BLD VENIPUNCTURE: CPT | Performed by: OBSTETRICS & GYNECOLOGY

## 2019-08-27 DIAGNOSIS — O99.810 ABNORMAL MATERNAL GLUCOSE TOLERANCE, ANTEPARTUM: Primary | ICD-10-CM

## 2019-08-27 LAB — GLUCOSE 1H P 50 G GLC PO SERPL-MCNC: 140 MG/DL (ref 60–129)

## 2019-09-03 DIAGNOSIS — O99.810 ABNORMAL MATERNAL GLUCOSE TOLERANCE, ANTEPARTUM: ICD-10-CM

## 2019-09-03 PROCEDURE — 36415 COLL VENOUS BLD VENIPUNCTURE: CPT | Performed by: OBSTETRICS & GYNECOLOGY

## 2019-09-03 PROCEDURE — 82951 GLUCOSE TOLERANCE TEST (GTT): CPT | Performed by: OBSTETRICS & GYNECOLOGY

## 2019-09-03 PROCEDURE — 82952 GTT-ADDED SAMPLES: CPT | Performed by: OBSTETRICS & GYNECOLOGY

## 2019-09-04 LAB
GLUCOSE 1H P 100 G GLC PO SERPL-MCNC: 90 MG/DL (ref 60–179)
GLUCOSE 2H P 100 G GLC PO SERPL-MCNC: 105 MG/DL (ref 60–154)
GLUCOSE 3H P 100 G GLC PO SERPL-MCNC: 73 MG/DL (ref 60–139)
GLUCOSE P FAST SERPL-MCNC: 79 MG/DL (ref 60–94)

## 2019-09-10 ENCOUNTER — PRENATAL OFFICE VISIT (OUTPATIENT)
Dept: OBGYN | Facility: CLINIC | Age: 29
End: 2019-09-10
Payer: COMMERCIAL

## 2019-09-10 VITALS — BODY MASS INDEX: 28.49 KG/M2 | WEIGHT: 166 LBS | SYSTOLIC BLOOD PRESSURE: 128 MMHG | DIASTOLIC BLOOD PRESSURE: 62 MMHG

## 2019-09-10 DIAGNOSIS — Z23 NEED FOR PROPHYLACTIC VACCINATION AND INOCULATION AGAINST INFLUENZA: ICD-10-CM

## 2019-09-10 DIAGNOSIS — Z34.80 SUPERVISION OF OTHER NORMAL PREGNANCY, ANTEPARTUM: Primary | ICD-10-CM

## 2019-09-10 PROCEDURE — 90686 IIV4 VACC NO PRSV 0.5 ML IM: CPT | Performed by: OBSTETRICS & GYNECOLOGY

## 2019-09-10 PROCEDURE — 99207 ZZC PRENATAL VISIT: CPT | Performed by: OBSTETRICS & GYNECOLOGY

## 2019-09-10 PROCEDURE — 90471 IMMUNIZATION ADMIN: CPT | Performed by: OBSTETRICS & GYNECOLOGY

## 2019-09-10 NOTE — PROGRESS NOTES
PROBLEM LIST  LABS: Apos/RI    1. Prior low transverse : plans repeat at 39w, may TOLAC if labors prior  2. History GDMA2: plan early GCT at 18-20w    Feeling well. Failed 1 hr glucose tolerance test but passed 3 hr: will repeat 3 hr test at 28 weeks.    Disha Tsang MD

## 2019-09-10 NOTE — NURSING NOTE
"Chief Complaint   Patient presents with     Prenatal Care       Initial /62 (BP Location: Right arm, Cuff Size: Adult Regular)   Wt 75.3 kg (166 lb)   LMP 2019 (Exact Date)   BMI 28.49 kg/m   Estimated body mass index is 28.49 kg/m  as calculated from the following:    Height as of 19: 1.626 m (5' 4\").    Weight as of this encounter: 75.3 kg (166 lb).  BP completed using cuff size: regular    Questioned patient about current smoking habits.  Pt. has never smoked.            +FM  -headaches  -edema  -contractions  -vaginal bleeding or leaking of fluids    Paulo Love, NEHAL             "

## 2019-10-08 ENCOUNTER — PRENATAL OFFICE VISIT (OUTPATIENT)
Dept: OBGYN | Facility: CLINIC | Age: 29
End: 2019-10-08
Payer: COMMERCIAL

## 2019-10-08 VITALS — BODY MASS INDEX: 29.87 KG/M2 | DIASTOLIC BLOOD PRESSURE: 62 MMHG | WEIGHT: 174 LBS | SYSTOLIC BLOOD PRESSURE: 122 MMHG

## 2019-10-08 DIAGNOSIS — Z34.80 SUPERVISION OF OTHER NORMAL PREGNANCY, ANTEPARTUM: Primary | ICD-10-CM

## 2019-10-08 PROCEDURE — 99207 ZZC PRENATAL VISIT: CPT | Performed by: OBSTETRICS & GYNECOLOGY

## 2019-10-08 NOTE — NURSING NOTE
"Chief Complaint   Patient presents with     Prenatal Care       Initial /62   Wt 78.9 kg (174 lb)   LMP 2019 (Exact Date)   Breastfeeding? No   BMI 29.87 kg/m   Estimated body mass index is 29.87 kg/m  as calculated from the following:    Height as of 19: 1.626 m (5' 4\").    Weight as of this encounter: 78.9 kg (174 lb).  BP completed using cuff size: regular    Questioned patient about current smoking habits.  Pt. has never smoked.          26w6d  + FM daily  + heartburn  - headache  - cramping or bleeding  Sharonda Au LPN                "

## 2019-10-08 NOTE — PROGRESS NOTES
PROBLEM LIST  LABS: Apos/RI    1. Prior low transverse : plans repeat at 39w, may TOLAC if labors prior  2. History GDMA2: plan early GCT at 18-20w (failed but passed 3 hr). Repeat 3 hr at 28w: pending    Feeling well. Plans repeat 3 hr glucose tolerance test this week, tdap next visit. Follow up in 2 weeks.    Disha Tsang MD

## 2019-10-11 DIAGNOSIS — Z34.80 SUPERVISION OF OTHER NORMAL PREGNANCY, ANTEPARTUM: ICD-10-CM

## 2019-10-11 PROCEDURE — 36415 COLL VENOUS BLD VENIPUNCTURE: CPT | Performed by: OBSTETRICS & GYNECOLOGY

## 2019-10-11 PROCEDURE — 82951 GLUCOSE TOLERANCE TEST (GTT): CPT | Performed by: OBSTETRICS & GYNECOLOGY

## 2019-10-11 PROCEDURE — 82952 GTT-ADDED SAMPLES: CPT | Performed by: OBSTETRICS & GYNECOLOGY

## 2019-10-12 LAB
GLUCOSE 1H P 100 G GLC PO SERPL-MCNC: 154 MG/DL (ref 60–179)
GLUCOSE 2H P 100 G GLC PO SERPL-MCNC: 136 MG/DL (ref 60–154)
GLUCOSE 3H P 100 G GLC PO SERPL-MCNC: 120 MG/DL (ref 60–139)
GLUCOSE P FAST SERPL-MCNC: 77 MG/DL (ref 60–94)

## 2019-10-22 ENCOUNTER — PRENATAL OFFICE VISIT (OUTPATIENT)
Dept: OBGYN | Facility: CLINIC | Age: 29
End: 2019-10-22
Payer: COMMERCIAL

## 2019-10-22 VITALS — WEIGHT: 177 LBS | DIASTOLIC BLOOD PRESSURE: 74 MMHG | SYSTOLIC BLOOD PRESSURE: 116 MMHG | BODY MASS INDEX: 30.38 KG/M2

## 2019-10-22 DIAGNOSIS — Z23 NEED FOR TDAP VACCINATION: Primary | ICD-10-CM

## 2019-10-22 DIAGNOSIS — Z34.80 SUPERVISION OF OTHER NORMAL PREGNANCY, ANTEPARTUM: ICD-10-CM

## 2019-10-22 LAB
ERYTHROCYTE [DISTWIDTH] IN BLOOD BY AUTOMATED COUNT: 12.9 % (ref 10–15)
HCT VFR BLD AUTO: 35.4 % (ref 35–47)
HGB BLD-MCNC: 11.8 G/DL (ref 11.7–15.7)
MCH RBC QN AUTO: 31.3 PG (ref 26.5–33)
MCHC RBC AUTO-ENTMCNC: 33.3 G/DL (ref 31.5–36.5)
MCV RBC AUTO: 94 FL (ref 78–100)
PLATELET # BLD AUTO: 295 10E9/L (ref 150–450)
RBC # BLD AUTO: 3.77 10E12/L (ref 3.8–5.2)
WBC # BLD AUTO: 10.9 10E9/L (ref 4–11)

## 2019-10-22 PROCEDURE — 90715 TDAP VACCINE 7 YRS/> IM: CPT | Performed by: OBSTETRICS & GYNECOLOGY

## 2019-10-22 PROCEDURE — 86780 TREPONEMA PALLIDUM: CPT | Performed by: OBSTETRICS & GYNECOLOGY

## 2019-10-22 PROCEDURE — 99207 ZZC PRENATAL VISIT: CPT | Performed by: OBSTETRICS & GYNECOLOGY

## 2019-10-22 PROCEDURE — 36415 COLL VENOUS BLD VENIPUNCTURE: CPT | Performed by: OBSTETRICS & GYNECOLOGY

## 2019-10-22 PROCEDURE — 85027 COMPLETE CBC AUTOMATED: CPT | Performed by: OBSTETRICS & GYNECOLOGY

## 2019-10-22 PROCEDURE — 90471 IMMUNIZATION ADMIN: CPT | Performed by: OBSTETRICS & GYNECOLOGY

## 2019-10-22 NOTE — NURSING NOTE
"Chief Complaint   Patient presents with     Prenatal Care       Initial /74   Wt 80.3 kg (177 lb)   LMP 2019 (Exact Date)   Breastfeeding? No   BMI 30.38 kg/m   Estimated body mass index is 30.38 kg/m  as calculated from the following:    Height as of 19: 1.626 m (5' 4\").    Weight as of this encounter: 80.3 kg (177 lb).  BP completed using cuff size: regular    Questioned patient about current smoking habits.  Pt. has never smoked.          28w6d  + FM daily  - cramping, bleeding or leaking of fluid  + mild heartburn  - headache  - edema   Sharonda Au LPN               "

## 2019-10-23 ENCOUNTER — PREP FOR PROCEDURE (OUTPATIENT)
Dept: OBGYN | Facility: CLINIC | Age: 29
End: 2019-10-23

## 2019-10-23 ENCOUNTER — TELEPHONE (OUTPATIENT)
Dept: OBGYN | Facility: CLINIC | Age: 29
End: 2019-10-23

## 2019-10-23 DIAGNOSIS — Z98.891 HISTORY OF CESAREAN SECTION: Primary | ICD-10-CM

## 2019-10-23 NOTE — TELEPHONE ENCOUNTER
Type of surgery: repeat  section  Location of surgery: [facility]  Date and time of surgery: 1/3/20 @ 10:30 am  Surgeon: Dr. Tsang  Pre-Op Appt Date: @ prenatal appointment  Post-Op Appt Date: Patient advised to schedule.   Packet sent out: Yes  Pre-cert/Authorization completed:  No  Date: 10/23/19

## 2019-10-23 NOTE — TELEPHONE ENCOUNTER
Surgeon:SOY CHIN   Assist:  No   Location: [facility]   Date/time preference:  1/3     Surgery:  Rpt    Length of Surgery:  1 hr   Diagnosis:  prior CS   Anesthesia type:  SPINAL       ThanksSoy MD

## 2019-10-23 NOTE — PROGRESS NOTES
PROBLEM LIST  LABS: Apos/RI    1. Prior low transverse : plans repeat at 39w, may TOLAC if labors prior  2. History GDMA2: plan early GCT at 18-20w (failed but passed 3 hr). Repeat 3 hr at 28w: nl.    Feeling well. tdap given.  Plan repeat  for January 3, but she may [place] if she labors prior to that.  She also understands that if the cervix is favorable and she prefers induction of labor to schedule repeat , that can be discussed at a later date.  She also understands that this does entail an increase in risks.  For now, her plan is for repeat  on January 3 if she does not labor prior to this.    Disha Tsang MD

## 2019-10-24 LAB — T PALLIDUM AB SER QL: NONREACTIVE

## 2019-10-24 RX ORDER — SODIUM CHLORIDE, SODIUM LACTATE, POTASSIUM CHLORIDE, CALCIUM CHLORIDE 600; 310; 30; 20 MG/100ML; MG/100ML; MG/100ML; MG/100ML
INJECTION, SOLUTION INTRAVENOUS CONTINUOUS
Status: CANCELLED | OUTPATIENT
Start: 2019-10-24

## 2019-10-24 RX ORDER — CITRIC ACID/SODIUM CITRATE 334-500MG
30 SOLUTION, ORAL ORAL
Status: CANCELLED | OUTPATIENT
Start: 2019-10-24

## 2019-11-05 ENCOUNTER — PRENATAL OFFICE VISIT (OUTPATIENT)
Dept: OBGYN | Facility: CLINIC | Age: 29
End: 2019-11-05
Payer: COMMERCIAL

## 2019-11-05 VITALS — BODY MASS INDEX: 30.55 KG/M2 | SYSTOLIC BLOOD PRESSURE: 114 MMHG | DIASTOLIC BLOOD PRESSURE: 78 MMHG | WEIGHT: 178 LBS

## 2019-11-05 DIAGNOSIS — Z34.80 SUPERVISION OF OTHER NORMAL PREGNANCY, ANTEPARTUM: ICD-10-CM

## 2019-11-05 DIAGNOSIS — Z98.891 HISTORY OF CESAREAN SECTION: Primary | ICD-10-CM

## 2019-11-05 PROCEDURE — 99207 ZZC PRENATAL VISIT: CPT | Performed by: OBSTETRICS & GYNECOLOGY

## 2019-11-05 NOTE — NURSING NOTE
"Chief Complaint   Patient presents with     Prenatal Care       Initial /78   Wt 80.7 kg (178 lb)   LMP 2019 (Exact Date)   Breastfeeding? No   BMI 30.55 kg/m   Estimated body mass index is 30.55 kg/m  as calculated from the following:    Height as of 19: 1.626 m (5' 4\").    Weight as of this encounter: 80.7 kg (178 lb).  BP completed using cuff size: regular    Questioned patient about current smoking habits.  Pt. has never smoked.          30w6d  + FM daily  + mild heartburn  - headache  - edema  - contractions  Sharonda Au LPN               "

## 2019-11-05 NOTE — PROGRESS NOTES
PROBLEM LIST  LABS: Apos/RI    1. Prior low transverse : plans repeat at 39w, may TOLAC if labors prior  2. History GDMA2: plan early GCT at 18-20w (failed but passed 3 hr). Repeat 3 hr at 28w: nl.    Plan repeat  for January 3, but she may Tolac if she labors prior to that.  She also understands that if the cervix is favorable and she prefers induction of labor to schedule repeat , that can be discussed at a later date.  She also understands that this does entail an increase in risks.  For now, her plan is for repeat  on January 3 if she does not labor prior to this.    Doing well, no concerns. Follow up in 2 weeks.    Disha Tsang MD

## 2019-12-03 ENCOUNTER — PRENATAL OFFICE VISIT (OUTPATIENT)
Dept: OBGYN | Facility: CLINIC | Age: 29
End: 2019-12-03
Payer: COMMERCIAL

## 2019-12-03 VITALS — SYSTOLIC BLOOD PRESSURE: 116 MMHG | WEIGHT: 183 LBS | DIASTOLIC BLOOD PRESSURE: 70 MMHG | BODY MASS INDEX: 31.41 KG/M2

## 2019-12-03 DIAGNOSIS — Z98.891 HISTORY OF CESAREAN SECTION: ICD-10-CM

## 2019-12-03 DIAGNOSIS — Z34.80 SUPERVISION OF OTHER NORMAL PREGNANCY, ANTEPARTUM: Primary | ICD-10-CM

## 2019-12-03 PROCEDURE — 99207 ZZC PRENATAL VISIT: CPT | Performed by: OBSTETRICS & GYNECOLOGY

## 2019-12-03 NOTE — NURSING NOTE
"Chief Complaint   Patient presents with     Prenatal Care       Initial /70   Wt 83 kg (183 lb)   LMP 2019 (Exact Date)   Breastfeeding No   BMI 31.41 kg/m   Estimated body mass index is 31.41 kg/m  as calculated from the following:    Height as of 19: 1.626 m (5' 4\").    Weight as of this encounter: 83 kg (183 lb).  BP completed using cuff size: regular    Questioned patient about current smoking habits.  Pt. has never smoked.          34w6d  + FM daily  + heartburn  - headache   + mild cramping  - bleeding  Sharonda Au LPN               "

## 2019-12-17 ENCOUNTER — PRENATAL OFFICE VISIT (OUTPATIENT)
Dept: OBGYN | Facility: CLINIC | Age: 29
End: 2019-12-17
Payer: COMMERCIAL

## 2019-12-17 VITALS — DIASTOLIC BLOOD PRESSURE: 76 MMHG | WEIGHT: 189 LBS | BODY MASS INDEX: 32.44 KG/M2 | SYSTOLIC BLOOD PRESSURE: 118 MMHG

## 2019-12-17 DIAGNOSIS — Z98.891 HISTORY OF CESAREAN SECTION: ICD-10-CM

## 2019-12-17 DIAGNOSIS — Z34.80 SUPERVISION OF OTHER NORMAL PREGNANCY, ANTEPARTUM: Primary | ICD-10-CM

## 2019-12-17 PROCEDURE — 99207 ZZC PRENATAL VISIT: CPT | Performed by: OBSTETRICS & GYNECOLOGY

## 2019-12-17 PROCEDURE — 87653 STREP B DNA AMP PROBE: CPT | Performed by: OBSTETRICS & GYNECOLOGY

## 2019-12-17 RX ORDER — BREAST PUMP
1 EACH MISCELLANEOUS PRN
Qty: 1 EACH | Refills: 0 | Status: SHIPPED | OUTPATIENT
Start: 2019-12-17 | End: 2022-05-23

## 2019-12-17 NOTE — NURSING NOTE
"Chief Complaint   Patient presents with     Prenatal Care       Initial /76   Wt 85.7 kg (189 lb)   LMP 2019 (Exact Date)   Breastfeeding No   BMI 32.44 kg/m   Estimated body mass index is 32.44 kg/m  as calculated from the following:    Height as of 19: 1.626 m (5' 4\").    Weight as of this encounter: 85.7 kg (189 lb).  BP completed using cuff size: regular    Questioned patient about current smoking habits.  Pt. has never smoked.          36w6d  + FM daily  + mild cramping  + pelvic pressure  - contractions  + mild edema  Sharonda Au LPN               "

## 2019-12-17 NOTE — PROGRESS NOTES
PROBLEM LIST  LABS: Apos/RI    1. Prior low transverse : plans repeat at 39w, may TOLAC if labors prior  2. History GDMA2: plan early GCT at 18-20w (failed but passed 3 hr). Repeat 3 hr at 28w: nl.    Has repeat  for January 3, but would like Tolac if she labors. She is interested in pushing the  date out a week to 1/10. There is no reason why this is not reasonable. She is not interested in IOL at this time if she does not labor spontaneously. Doing well, no concerns. Follow up in 1 week.    Disha Tsang MD

## 2019-12-18 NOTE — TELEPHONE ENCOUNTER
rescheduled to 1/10/20    Type of surgery: repeat  section  Location of surgery: [facility]  Date and time of surgery: 1/10/20 @ 12:00 pm  Surgeon: Dr. Tsang  Pre-Op Appt Date: @ prenatal appointment  Post-Op Appt Date: Patient advised to schedule.   Packet sent out: Yes  Pre-cert/Authorization completed:  No  Date: 19

## 2019-12-19 LAB
GP B STREP DNA SPEC QL NAA+PROBE: NEGATIVE
SPECIMEN SOURCE: NORMAL

## 2019-12-23 ENCOUNTER — PRENATAL OFFICE VISIT (OUTPATIENT)
Dept: OBGYN | Facility: CLINIC | Age: 29
End: 2019-12-23
Payer: COMMERCIAL

## 2019-12-23 VITALS — BODY MASS INDEX: 32.54 KG/M2 | SYSTOLIC BLOOD PRESSURE: 126 MMHG | DIASTOLIC BLOOD PRESSURE: 72 MMHG | WEIGHT: 189.6 LBS

## 2019-12-23 DIAGNOSIS — Z34.80 SUPERVISION OF OTHER NORMAL PREGNANCY, ANTEPARTUM: Primary | ICD-10-CM

## 2019-12-23 DIAGNOSIS — Z98.891 S/P PRIMARY LOW TRANSVERSE C-SECTION: ICD-10-CM

## 2019-12-23 DIAGNOSIS — Z86.32 HISTORY OF GESTATIONAL DIABETES: ICD-10-CM

## 2019-12-23 PROCEDURE — 99207 ZZC PRENATAL VISIT: CPT | Performed by: FAMILY MEDICINE

## 2019-12-23 NOTE — NURSING NOTE
"Chief Complaint   Patient presents with     Prenatal Care     37 weeks 5 days, no c/o vaginal bleeding, leaking of fluids, contractions. Feeling fetal movement daily       Initial /72 (BP Location: Right arm, Patient Position: Chair, Cuff Size: Adult Regular)   Wt 86 kg (189 lb 9.6 oz)   LMP 2019 (Exact Date)   BMI 32.54 kg/m   Estimated body mass index is 32.54 kg/m  as calculated from the following:    Height as of 19: 1.626 m (5' 4\").    Weight as of this encounter: 86 kg (189 lb 9.6 oz).  BP completed using cuff size: regular    Questioned patient about current smoking habits.  Pt. has never smoked.          The following HM Due: NONE      Maureen Siegel CMA               "

## 2019-12-23 NOTE — PATIENT INSTRUCTIONS
"Return weekly   Return to clinic:  every 4 weeks till 28 weeks, then every 2 weeks till 36 weeks, then weekly till delivery      Phone numbers [place]:  Day/ night [phone] ask for ob triage  Emergency:  Call labor and delivery:  [phone]    What should I call about??    Contraction every 5 minutes for 1 hour 1 minute long (511), bleeding, loss of fluid, headache that doesn't resolve with tylenol, and decreased fetal movement     Start kick counts @ 26-28 weeks   There is an dillon for this!  It is called \"count the kicks\"  Keep track of movement and discover your normal baby movement pattern   guideline is listed below  Please call if you do not feel the baby move!  We will have you come in for fetal heart rate monitoring:   Perception of at least 10 FMs during 12 hours of normal maternal activity   Perception of least 10 FMs over two hours when the mother is at rest and focused on [facility] Address   [address], [place], [place] [address] [phone]    Dr. Lisset Joaquin, DO    OB/GYN   [facility]                                                      "

## 2019-12-23 NOTE — PROGRESS NOTES
CC: Here for routine prenatal visit   29 year old y/o  @ 37w5d with Estimated Date of Delivery: 2020     /72 (BP Location: Right arm, Patient Position: Chair, Cuff Size: Adult Regular)   Wt 86 kg (189 lb 9.6 oz)   LMP 2019 (Exact Date)   BMI 32.54 kg/m    See OB flowsheet  + fetal movement, no contractions, no bleeding, no loss of fluid   Discussed monitoring fetal movement     1) concerns: none   2) Routine: A+ RI unknown gender   3) Risk factors:   A: hx of CS, RCS scheduled   B: Hx GDMA2, none this pregnancy  4) Problem list   Patient Active Problem List   Diagnosis     High risk pregnancy in young primigravida     S/P primary low transverse      Gallstone pancreatitis     ALLYSON (generalized anxiety disorder)     History of  section       4) Return: weekly     Jemal

## 2019-12-31 ENCOUNTER — PRENATAL OFFICE VISIT (OUTPATIENT)
Dept: OBGYN | Facility: CLINIC | Age: 29
End: 2019-12-31
Payer: COMMERCIAL

## 2019-12-31 VITALS — SYSTOLIC BLOOD PRESSURE: 118 MMHG | DIASTOLIC BLOOD PRESSURE: 68 MMHG | BODY MASS INDEX: 33.81 KG/M2 | WEIGHT: 197 LBS

## 2019-12-31 DIAGNOSIS — Z34.80 SUPERVISION OF OTHER NORMAL PREGNANCY, ANTEPARTUM: Primary | ICD-10-CM

## 2019-12-31 PROCEDURE — 99207 ZZC PRENATAL VISIT: CPT | Performed by: OBSTETRICS & GYNECOLOGY

## 2019-12-31 NOTE — PROGRESS NOTES
Doing well.  +FM, no real ctx, no VB or LOF.    29 year old  at 38w6d   - declines membrane stripping today but may desire it next visit because now she is thinking more that she may like to TOLAC in this pregnancy  - h/o CS x1, scheduled for RLTCS at 40w on 1/10 if undelivered  - reviewed s/s labor, has # for BC    RTC 1 week    Aurelia Borjas MD, MPH  [facility]

## 2019-12-31 NOTE — NURSING NOTE
"Chief Complaint   Patient presents with     Prenatal Care     38 6/7 weeks       Initial /68   Wt 89.4 kg (197 lb)   LMP 2019 (Exact Date)   BMI 33.81 kg/m   Estimated body mass index is 33.81 kg/m  as calculated from the following:    Height as of 19: 1.626 m (5' 4\").    Weight as of this encounter: 89.4 kg (197 lb).  BP completed using cuff size: regular    Questioned patient about current smoking habits.  Pt. has never smoked.          The following HM Due: NONE    +fetal movement  -swelling    Carmelita Walker, CMA    "

## 2020-01-06 ENCOUNTER — PRENATAL OFFICE VISIT (OUTPATIENT)
Dept: OBGYN | Facility: CLINIC | Age: 30
End: 2020-01-06
Payer: COMMERCIAL

## 2020-01-06 VITALS — WEIGHT: 197 LBS | SYSTOLIC BLOOD PRESSURE: 136 MMHG | DIASTOLIC BLOOD PRESSURE: 82 MMHG | BODY MASS INDEX: 33.81 KG/M2

## 2020-01-06 DIAGNOSIS — O34.219 PREVIOUS CESAREAN DELIVERY, ANTEPARTUM CONDITION OR COMPLICATION: Primary | ICD-10-CM

## 2020-01-06 PROCEDURE — 99207 ZZC COMPLICATED OB VISIT: CPT | Performed by: OBSTETRICS & GYNECOLOGY

## 2020-01-06 NOTE — NURSING NOTE
"Chief Complaint   Patient presents with     Prenatal Care     39 weeks 5 days- no concerns        Initial /82 (BP Location: Right arm, Patient Position: Sitting, Cuff Size: Adult Regular)   Wt 89.4 kg (197 lb)   LMP 2019 (Exact Date)   BMI 33.81 kg/m   Estimated body mass index is 33.81 kg/m  as calculated from the following:    Height as of 19: 1.626 m (5' 4\").    Weight as of this encounter: 89.4 kg (197 lb).  BP completed using cuff size: regular    Questioned patient about current smoking habits.  Pt. has never smoked.          The following HM Due: NONE    39w5d  Tito Rivera CMA                "

## 2020-01-06 NOTE — PROGRESS NOTES
No c/o's.  Is scheduled for RC/S w/ Dr. Tsang if does not labor.  Fetal movement counts BID, rupture of membranes/labor precautions reviewed.    Encounter Diagnosis   Name Primary?     Previous  delivery, antepartum condition or complication Yes       Risk factors listed above are stable and being addressed as noted.    Josiah Palomo MD  [facility]

## 2020-01-07 ASSESSMENT — MIFFLIN-ST. JEOR: SCORE: 1600.65

## 2020-01-08 NOTE — PHARMACY-ADMISSION MEDICATION HISTORY
Medication reconciliation interview completed by pre-admitting nurse Rachel Morales, reviewed by pharmacy. No further clarifications needed.       Prior to Admission medications    Medication Sig Last Dose Taking? Auth Provider   Prenatal Vit-Fe Fumarate-FA (PRENATAL VITAMIN) 27-0.8 MG TABS   Yes Reported, Patient   Misc. Devices (BREAST PUMP) MISC 1 each as needed  Patient not taking: Reported on 12/23/2019   Disha Tsang MD

## 2020-01-09 ENCOUNTER — HOSPITAL ENCOUNTER (OUTPATIENT)
Dept: LAB | Facility: CLINIC | Age: 30
Discharge: HOME | End: 2020-01-09
Attending: OBSTETRICS & GYNECOLOGY | Admitting: OBSTETRICS & GYNECOLOGY
Payer: COMMERCIAL

## 2020-01-09 DIAGNOSIS — Z01.812 PRE-OPERATIVE LABORATORY EXAMINATION: ICD-10-CM

## 2020-01-09 LAB
ABO + RH BLD: NORMAL
ABO + RH BLD: NORMAL
BLD GP AB SCN SERPL QL: NORMAL
BLOOD BANK CMNT PATIENT-IMP: NORMAL
HGB BLD-MCNC: 12.6 G/DL (ref 11.7–15.7)
SPECIMEN EXP DATE BLD: NORMAL

## 2020-01-09 PROCEDURE — 86850 RBC ANTIBODY SCREEN: CPT | Performed by: OBSTETRICS & GYNECOLOGY

## 2020-01-09 PROCEDURE — 86900 BLOOD TYPING SEROLOGIC ABO: CPT | Performed by: OBSTETRICS & GYNECOLOGY

## 2020-01-09 PROCEDURE — 86780 TREPONEMA PALLIDUM: CPT | Performed by: OBSTETRICS & GYNECOLOGY

## 2020-01-09 PROCEDURE — 86901 BLOOD TYPING SEROLOGIC RH(D): CPT | Performed by: OBSTETRICS & GYNECOLOGY

## 2020-01-09 PROCEDURE — 36415 COLL VENOUS BLD VENIPUNCTURE: CPT | Performed by: OBSTETRICS & GYNECOLOGY

## 2020-01-09 PROCEDURE — 85018 HEMOGLOBIN: CPT | Performed by: OBSTETRICS & GYNECOLOGY

## 2020-01-10 ENCOUNTER — HOSPITAL ENCOUNTER (INPATIENT)
Facility: CLINIC | Age: 30
LOS: 3 days | Discharge: HOME | End: 2020-01-13
Attending: OBSTETRICS & GYNECOLOGY | Admitting: OBSTETRICS & GYNECOLOGY
Payer: COMMERCIAL

## 2020-01-10 ENCOUNTER — ANESTHESIA EVENT (OUTPATIENT)
Dept: SURGERY | Facility: CLINIC | Age: 30
End: 2020-01-10
Payer: COMMERCIAL

## 2020-01-10 ENCOUNTER — ANESTHESIA (OUTPATIENT)
Dept: SURGERY | Facility: CLINIC | Age: 30
End: 2020-01-10
Payer: COMMERCIAL

## 2020-01-10 DIAGNOSIS — G89.18 POSTOPERATIVE PAIN: ICD-10-CM

## 2020-01-10 DIAGNOSIS — Z98.891 HISTORY OF CESAREAN SECTION: ICD-10-CM

## 2020-01-10 LAB — T PALLIDUM AB SER QL: NONREACTIVE

## 2020-01-10 PROCEDURE — 12000000 ZZH R&B MED SURG/OB

## 2020-01-10 PROCEDURE — 71000012 ZZH RECOVERY PHASE 1 LEVEL 1 FIRST HR: Performed by: OBSTETRICS & GYNECOLOGY

## 2020-01-10 PROCEDURE — 25000125 ZZHC RX 250: Performed by: OBSTETRICS & GYNECOLOGY

## 2020-01-10 PROCEDURE — 25000125 ZZHC RX 250: Performed by: NURSE ANESTHETIST, CERTIFIED REGISTERED

## 2020-01-10 PROCEDURE — 59510 CESAREAN DELIVERY: CPT | Performed by: OBSTETRICS & GYNECOLOGY

## 2020-01-10 PROCEDURE — 36000058 ZZH SURGERY LEVEL 3 EA 15 ADDTL MIN: Performed by: OBSTETRICS & GYNECOLOGY

## 2020-01-10 PROCEDURE — 25000128 H RX IP 250 OP 636: Performed by: NURSE ANESTHETIST, CERTIFIED REGISTERED

## 2020-01-10 PROCEDURE — 25800030 ZZH RX IP 258 OP 636: Performed by: OBSTETRICS & GYNECOLOGY

## 2020-01-10 PROCEDURE — 25000128 H RX IP 250 OP 636: Performed by: OBSTETRICS & GYNECOLOGY

## 2020-01-10 PROCEDURE — 37000008 ZZH ANESTHESIA TECHNICAL FEE, 1ST 30 MIN: Performed by: OBSTETRICS & GYNECOLOGY

## 2020-01-10 PROCEDURE — 25800030 ZZH RX IP 258 OP 636: Performed by: NURSE ANESTHETIST, CERTIFIED REGISTERED

## 2020-01-10 PROCEDURE — 37000009 ZZH ANESTHESIA TECHNICAL FEE, EACH ADDTL 15 MIN: Performed by: OBSTETRICS & GYNECOLOGY

## 2020-01-10 PROCEDURE — 25000132 ZZH RX MED GY IP 250 OP 250 PS 637: Performed by: OBSTETRICS & GYNECOLOGY

## 2020-01-10 PROCEDURE — 36000056 ZZH SURGERY LEVEL 3 1ST 30 MIN: Performed by: OBSTETRICS & GYNECOLOGY

## 2020-01-10 PROCEDURE — 27210794 ZZH OR GENERAL SUPPLY STERILE: Performed by: OBSTETRICS & GYNECOLOGY

## 2020-01-10 RX ORDER — DEXAMETHASONE SODIUM PHOSPHATE 4 MG/ML
INJECTION, SOLUTION INTRA-ARTICULAR; INTRALESIONAL; INTRAMUSCULAR; INTRAVENOUS; SOFT TISSUE PRN
Status: DISCONTINUED | OUTPATIENT
Start: 2020-01-10 | End: 2020-01-10

## 2020-01-10 RX ORDER — ACETAMINOPHEN 325 MG/1
975 TABLET ORAL EVERY 8 HOURS
Status: COMPLETED | OUTPATIENT
Start: 2020-01-10 | End: 2020-01-13

## 2020-01-10 RX ORDER — BISACODYL 10 MG
10 SUPPOSITORY, RECTAL RECTAL DAILY PRN
Status: DISCONTINUED | OUTPATIENT
Start: 2020-01-12 | End: 2020-01-13 | Stop reason: HOSPADM

## 2020-01-10 RX ORDER — OXYTOCIN/0.9 % SODIUM CHLORIDE 30/500 ML
PLASTIC BAG, INJECTION (ML) INTRAVENOUS PRN
Status: DISCONTINUED | OUTPATIENT
Start: 2020-01-10 | End: 2020-01-10

## 2020-01-10 RX ORDER — SODIUM CHLORIDE, SODIUM LACTATE, POTASSIUM CHLORIDE, CALCIUM CHLORIDE 600; 310; 30; 20 MG/100ML; MG/100ML; MG/100ML; MG/100ML
INJECTION, SOLUTION INTRAVENOUS CONTINUOUS
Status: DISCONTINUED | OUTPATIENT
Start: 2020-01-10 | End: 2020-01-10 | Stop reason: HOSPADM

## 2020-01-10 RX ORDER — ONDANSETRON 2 MG/ML
4 INJECTION INTRAMUSCULAR; INTRAVENOUS EVERY 6 HOURS PRN
Status: DISCONTINUED | OUTPATIENT
Start: 2020-01-10 | End: 2020-01-13 | Stop reason: HOSPADM

## 2020-01-10 RX ORDER — DIPHENHYDRAMINE HYDROCHLORIDE 50 MG/ML
25 INJECTION INTRAMUSCULAR; INTRAVENOUS EVERY 6 HOURS PRN
Status: DISCONTINUED | OUTPATIENT
Start: 2020-01-10 | End: 2020-01-13 | Stop reason: HOSPADM

## 2020-01-10 RX ORDER — PROCHLORPERAZINE 25 MG
25 SUPPOSITORY, RECTAL RECTAL EVERY 12 HOURS PRN
Status: DISCONTINUED | OUTPATIENT
Start: 2020-01-10 | End: 2020-01-13 | Stop reason: HOSPADM

## 2020-01-10 RX ORDER — MORPHINE SULFATE 1 MG/ML
INJECTION, SOLUTION EPIDURAL; INTRATHECAL; INTRAVENOUS PRN
Status: DISCONTINUED | OUTPATIENT
Start: 2020-01-10 | End: 2020-01-10

## 2020-01-10 RX ORDER — OXYTOCIN/0.9 % SODIUM CHLORIDE 30/500 ML
340 PLASTIC BAG, INJECTION (ML) INTRAVENOUS CONTINUOUS PRN
Status: DISCONTINUED | OUTPATIENT
Start: 2020-01-10 | End: 2020-01-13 | Stop reason: HOSPADM

## 2020-01-10 RX ORDER — IBUPROFEN 800 MG/1
800 TABLET, FILM COATED ORAL EVERY 6 HOURS PRN
Status: DISCONTINUED | OUTPATIENT
Start: 2020-01-11 | End: 2020-01-13 | Stop reason: HOSPADM

## 2020-01-10 RX ORDER — LANOLIN 100 %
OINTMENT (GRAM) TOPICAL
Status: DISCONTINUED | OUTPATIENT
Start: 2020-01-10 | End: 2020-01-13 | Stop reason: HOSPADM

## 2020-01-10 RX ORDER — DEXTROSE, SODIUM CHLORIDE, SODIUM LACTATE, POTASSIUM CHLORIDE, AND CALCIUM CHLORIDE 5; .6; .31; .03; .02 G/100ML; G/100ML; G/100ML; G/100ML; G/100ML
INJECTION, SOLUTION INTRAVENOUS CONTINUOUS
Status: DISCONTINUED | OUTPATIENT
Start: 2020-01-10 | End: 2020-01-13 | Stop reason: HOSPADM

## 2020-01-10 RX ORDER — OXYCODONE HYDROCHLORIDE 5 MG/1
5 TABLET ORAL EVERY 4 HOURS PRN
Status: DISCONTINUED | OUTPATIENT
Start: 2020-01-10 | End: 2020-01-13 | Stop reason: HOSPADM

## 2020-01-10 RX ORDER — KETOROLAC TROMETHAMINE 30 MG/ML
30 INJECTION, SOLUTION INTRAMUSCULAR; INTRAVENOUS EVERY 6 HOURS
Status: COMPLETED | OUTPATIENT
Start: 2020-01-10 | End: 2020-01-11

## 2020-01-10 RX ORDER — CEFAZOLIN SODIUM 2 G/100ML
INJECTION, SOLUTION INTRAVENOUS PRN
Status: DISCONTINUED | OUTPATIENT
Start: 2020-01-10 | End: 2020-01-10

## 2020-01-10 RX ORDER — AMOXICILLIN 250 MG
1 CAPSULE ORAL 2 TIMES DAILY PRN
Status: DISCONTINUED | OUTPATIENT
Start: 2020-01-10 | End: 2020-01-13 | Stop reason: HOSPADM

## 2020-01-10 RX ORDER — SIMETHICONE 80 MG
80 TABLET,CHEWABLE ORAL 4 TIMES DAILY PRN
Status: DISCONTINUED | OUTPATIENT
Start: 2020-01-10 | End: 2020-01-13 | Stop reason: HOSPADM

## 2020-01-10 RX ORDER — PHENYLEPHRINE HYDROCHLORIDE 10 MG/ML
INJECTION INTRAVENOUS PRN
Status: DISCONTINUED | OUTPATIENT
Start: 2020-01-10 | End: 2020-01-10

## 2020-01-10 RX ORDER — BUPIVACAINE HYDROCHLORIDE 7.5 MG/ML
INJECTION, SOLUTION INTRASPINAL PRN
Status: DISCONTINUED | OUTPATIENT
Start: 2020-01-10 | End: 2020-01-10

## 2020-01-10 RX ORDER — CITRIC ACID/SODIUM CITRATE 334-500MG
30 SOLUTION, ORAL ORAL
Status: COMPLETED | OUTPATIENT
Start: 2020-01-10 | End: 2020-01-10

## 2020-01-10 RX ORDER — HYDROCORTISONE 2.5 %
CREAM (GRAM) TOPICAL 3 TIMES DAILY PRN
Status: DISCONTINUED | OUTPATIENT
Start: 2020-01-10 | End: 2020-01-13 | Stop reason: HOSPADM

## 2020-01-10 RX ORDER — DIPHENHYDRAMINE HCL 25 MG
25 CAPSULE ORAL EVERY 6 HOURS PRN
Status: DISCONTINUED | OUTPATIENT
Start: 2020-01-10 | End: 2020-01-13 | Stop reason: HOSPADM

## 2020-01-10 RX ORDER — AMOXICILLIN 250 MG
2 CAPSULE ORAL 2 TIMES DAILY PRN
Status: DISCONTINUED | OUTPATIENT
Start: 2020-01-10 | End: 2020-01-13 | Stop reason: HOSPADM

## 2020-01-10 RX ORDER — CEFAZOLIN SODIUM 2 G/100ML
2 INJECTION, SOLUTION INTRAVENOUS
Status: DISCONTINUED | OUTPATIENT
Start: 2020-01-10 | End: 2020-01-10 | Stop reason: HOSPADM

## 2020-01-10 RX ORDER — OXYTOCIN/0.9 % SODIUM CHLORIDE 30/500 ML
PLASTIC BAG, INJECTION (ML) INTRAVENOUS
Status: DISCONTINUED
Start: 2020-01-10 | End: 2020-01-10 | Stop reason: HOSPADM

## 2020-01-10 RX ORDER — OXYTOCIN/0.9 % SODIUM CHLORIDE 30/500 ML
100 PLASTIC BAG, INJECTION (ML) INTRAVENOUS CONTINUOUS
Status: DISCONTINUED | OUTPATIENT
Start: 2020-01-10 | End: 2020-01-13 | Stop reason: HOSPADM

## 2020-01-10 RX ORDER — ONDANSETRON 2 MG/ML
INJECTION INTRAMUSCULAR; INTRAVENOUS PRN
Status: DISCONTINUED | OUTPATIENT
Start: 2020-01-10 | End: 2020-01-10

## 2020-01-10 RX ORDER — CEFAZOLIN SODIUM 1 G/3ML
1 INJECTION, POWDER, FOR SOLUTION INTRAMUSCULAR; INTRAVENOUS SEE ADMIN INSTRUCTIONS
Status: DISCONTINUED | OUTPATIENT
Start: 2020-01-10 | End: 2020-01-10 | Stop reason: HOSPADM

## 2020-01-10 RX ORDER — LIDOCAINE 40 MG/G
CREAM TOPICAL
Status: DISCONTINUED | OUTPATIENT
Start: 2020-01-10 | End: 2020-01-13 | Stop reason: HOSPADM

## 2020-01-10 RX ORDER — FENTANYL CITRATE 50 UG/ML
INJECTION, SOLUTION INTRAMUSCULAR; INTRAVENOUS PRN
Status: DISCONTINUED | OUTPATIENT
Start: 2020-01-10 | End: 2020-01-10

## 2020-01-10 RX ORDER — SODIUM CHLORIDE, SODIUM LACTATE, POTASSIUM CHLORIDE, CALCIUM CHLORIDE 600; 310; 30; 20 MG/100ML; MG/100ML; MG/100ML; MG/100ML
INJECTION, SOLUTION INTRAVENOUS CONTINUOUS PRN
Status: DISCONTINUED | OUTPATIENT
Start: 2020-01-10 | End: 2020-01-10

## 2020-01-10 RX ORDER — OXYTOCIN 10 [USP'U]/ML
10 INJECTION, SOLUTION INTRAMUSCULAR; INTRAVENOUS
Status: DISCONTINUED | OUTPATIENT
Start: 2020-01-10 | End: 2020-01-13 | Stop reason: HOSPADM

## 2020-01-10 RX ORDER — ACETAMINOPHEN 325 MG/1
650 TABLET ORAL EVERY 4 HOURS PRN
Status: DISCONTINUED | OUTPATIENT
Start: 2020-01-13 | End: 2020-01-13 | Stop reason: HOSPADM

## 2020-01-10 RX ORDER — MISOPROSTOL 200 UG/1
800 TABLET ORAL
Status: DISCONTINUED | OUTPATIENT
Start: 2020-01-10 | End: 2020-01-13 | Stop reason: HOSPADM

## 2020-01-10 RX ORDER — NALOXONE HYDROCHLORIDE 0.4 MG/ML
.1-.4 INJECTION, SOLUTION INTRAMUSCULAR; INTRAVENOUS; SUBCUTANEOUS
Status: DISCONTINUED | OUTPATIENT
Start: 2020-01-10 | End: 2020-01-13 | Stop reason: HOSPADM

## 2020-01-10 RX ADMIN — MORPHINE SULFATE 0.15 MG: 1 INJECTION, SOLUTION EPIDURAL; INTRATHECAL; INTRAVENOUS at 11:54

## 2020-01-10 RX ADMIN — BUPIVACAINE HYDROCHLORIDE IN DEXTROSE 13.5 MG: 7.5 INJECTION, SOLUTION SUBARACHNOID at 11:54

## 2020-01-10 RX ADMIN — KETOROLAC TROMETHAMINE 30 MG: 30 INJECTION, SOLUTION INTRAMUSCULAR at 20:20

## 2020-01-10 RX ADMIN — SODIUM CHLORIDE, POTASSIUM CHLORIDE, SODIUM LACTATE AND CALCIUM CHLORIDE: 600; 310; 30; 20 INJECTION, SOLUTION INTRAVENOUS at 11:46

## 2020-01-10 RX ADMIN — CEFAZOLIN SODIUM 2 G: 2 INJECTION, SOLUTION INTRAVENOUS at 11:46

## 2020-01-10 RX ADMIN — ONDANSETRON HYDROCHLORIDE 4 MG: 2 INJECTION, SOLUTION INTRAVENOUS at 11:50

## 2020-01-10 RX ADMIN — ACETAMINOPHEN 975 MG: 325 TABLET, FILM COATED ORAL at 23:40

## 2020-01-10 RX ADMIN — OXYTOCIN-SODIUM CHLORIDE 0.9% IV SOLN 30 UNIT/500ML 350 ML: 30-0.9/5 SOLUTION at 12:51

## 2020-01-10 RX ADMIN — SODIUM CHLORIDE, POTASSIUM CHLORIDE, SODIUM LACTATE AND CALCIUM CHLORIDE: 600; 310; 30; 20 INJECTION, SOLUTION INTRAVENOUS at 11:34

## 2020-01-10 RX ADMIN — OXYCODONE HYDROCHLORIDE 5 MG: 5 TABLET ORAL at 15:07

## 2020-01-10 RX ADMIN — OXYCODONE HYDROCHLORIDE 5 MG: 5 TABLET ORAL at 19:23

## 2020-01-10 RX ADMIN — KETOROLAC TROMETHAMINE 30 MG: 30 INJECTION, SOLUTION INTRAMUSCULAR at 14:36

## 2020-01-10 RX ADMIN — SODIUM CITRATE AND CITRIC ACID MONOHYDRATE 30 ML: 500; 334 SOLUTION ORAL at 11:34

## 2020-01-10 RX ADMIN — FENTANYL CITRATE 10 MCG: 50 INJECTION, SOLUTION INTRAMUSCULAR; INTRAVENOUS at 11:54

## 2020-01-10 RX ADMIN — Medication 100 ML/HR: at 13:10

## 2020-01-10 RX ADMIN — ACETAMINOPHEN 975 MG: 325 TABLET, FILM COATED ORAL at 14:36

## 2020-01-10 RX ADMIN — PHENYLEPHRINE HYDROCHLORIDE 200 MCG: 10 INJECTION INTRAVENOUS at 11:50

## 2020-01-10 RX ADMIN — DEXAMETHASONE SODIUM PHOSPHATE 8 MG: 4 INJECTION, SOLUTION INTRA-ARTICULAR; INTRALESIONAL; INTRAMUSCULAR; INTRAVENOUS; SOFT TISSUE at 11:50

## 2020-01-10 RX ADMIN — OXYCODONE HYDROCHLORIDE 5 MG: 5 TABLET ORAL at 23:40

## 2020-01-10 RX ADMIN — SODIUM CHLORIDE, SODIUM LACTATE, POTASSIUM CHLORIDE, CALCIUM CHLORIDE AND DEXTROSE MONOHYDRATE: 5; 600; 310; 30; 20 INJECTION, SOLUTION INTRAVENOUS at 18:54

## 2020-01-10 RX ADMIN — SODIUM CHLORIDE, POTASSIUM CHLORIDE, SODIUM LACTATE AND CALCIUM CHLORIDE 1000 ML: 600; 310; 30; 20 INJECTION, SOLUTION INTRAVENOUS at 10:24

## 2020-01-10 RX ADMIN — MIDAZOLAM 2 MG: 1 INJECTION INTRAMUSCULAR; INTRAVENOUS at 11:50

## 2020-01-10 ASSESSMENT — MIFFLIN-ST. JEOR: SCORE: 1604.05

## 2020-01-10 ASSESSMENT — ACTIVITIES OF DAILY LIVING (ADL)
FALL_HISTORY_WITHIN_LAST_SIX_MONTHS: NO
SWALLOWING: 0-->SWALLOWS FOODS/LIQUIDS WITHOUT DIFFICULTY
RETIRED_EATING: 0-->INDEPENDENT
COGNITION: 0 - NO COGNITION ISSUES REPORTED
AMBULATION: 0-->INDEPENDENT
BATHING: 0-->INDEPENDENT
RETIRED_COMMUNICATION: 0-->UNDERSTANDS/COMMUNICATES WITHOUT DIFFICULTY
TRANSFERRING: 0-->INDEPENDENT
DRESS: 0-->INDEPENDENT
TOILETING: 0-->INDEPENDENT

## 2020-01-10 NOTE — ANESTHESIA POSTPROCEDURE EVALUATION
Patient: Paulo Lema    Procedure(s):  REPEAT  SECTION    Diagnosis:History of  section [Z98.891]  Diagnosis Additional Information: No value filed.    Anesthesia Type:  Spinal    Note:  Anesthesia Post Evaluation    Patient location during evaluation: Bedside  Patient participation: Able to participate in evaluation but full recovery from regional anesthesia has not yet ocurrred but is anticipated to occur within 48 hours  Level of consciousness: awake  Pain management: adequate  multimodal analgesia used between 6 hours prior to anesthesia start to PACU dischargeAirway patency: patent  Cardiovascular status: acceptable  Respiratory status: acceptable  Hydration status: acceptable  PONV: none             Last vitals:  Vitals:    01/10/20 1014   Resp: 18   Temp: 98.1  F (36.7  C)         Electronically Signed By: El Atkinson MD  January 10, 2020  1:21 PM

## 2020-01-10 NOTE — PLAN OF CARE
Data: Paulo Lema transferred to 454 via cart at 1450. Baby transferred via parent's arms.  Action: Receiving unit notified of transfer: Yes. Patient and family notified of room change. Report given to PEDRITO Leo at 1525. Belongings sent to receiving unit. Accompanied by Registered Nurse. Oriented patient to surroundings. Call light within reach. ID bands double-checked with receiving RN.  Response: Patient tolerated transfer and is stable.

## 2020-01-10 NOTE — ANESTHESIA CARE TRANSFER NOTE
Patient: Paulo Lema    Procedure(s):  REPEAT  SECTION    Diagnosis: History of  section [Z98.891]  Diagnosis Additional Information: No value filed.    Anesthesia Type:   Spinal     Note:  Airway :Room Air  Patient transferred to:Labor and Delivery  Comments: Pt awake vss exchanging well report to rnPt awake vss exchanging well report to rnPt awake vss exchanging well report to rnHandoff Report: Identifed the Patient, Identified the Reponsible Provider, Reviewed the pertinent medical history, Discussed the surgical course, Reviewed Intra-OP anesthesia mangement and issues during anesthesia, Set expectations for post-procedure period and Allowed opportunity for questions and acknowledgement of understandingpost-procedure handoff checklist not completed for medical reasons      Vitals: (Last set prior to Anesthesia Care Transfer)    CRNA VITALS  1/10/2020 1214 - 1/10/2020 1252      1/10/2020             Pulse:  81    SpO2:  98 %                Electronically Signed By: REID Zarate CRNA  January 10, 2020  12:52 PM

## 2020-01-10 NOTE — H&P
[facility]    History and Physical  Obstetrics and Gynecology     Date of Admission:  1/10/2020    Assessment & Plan   Paulo Lema is a 29 year old female who is scheduled for Repeat  section.     ASSESSMENT:   IUP @ 40w2d today  Desires RCS       PLAN:   Scheduled for  section.    The risks, benefits, complications, treatment options, non-operative alternatives were discussed with the patient. Risks include but are not limited to infection, bleeding possibly requiring blood transfusion, injury to surrounding organs such as uterus/ovaries/bladder/bowel/nerves/ blood vessels with possible need for further surgery/procedure, injury to baby, VTE/PE, and remote risk of maternal/fetal death. The patient concurred with the proposed plan, giving informed consent.     All questions were answered.     Disha Tsang MD    History of Present Illness   Paulo Lema is a 29 year old female  40w2d planning for Repeat  section.  Initially desired TOLAC, but was not interested in IOL if she did not spontaneously labor. She is here today for RCS at 40w2d.    PRENATAL COURSE  Prenatal course was essentially uncomplicated      Recent Labs   Lab Test 20  0850   ABO A   RH Pos   AS Neg     Rhogam not indicated   Recent Labs   Lab Test 19  1415 19  0900   HEPBANG  --  Nonreactive   HIAGAB  --  Nonreactive   GBS Negative  --    RUQIGG  --  20         Prior to Admission Medications   Prior to Admission Medications   Prescriptions Last Dose Informant Patient Reported? Taking?   Misc. Devices (BREAST PUMP) MISC   No No   Si each as needed   Patient not taking: Reported on 2019   Prenatal Vit-Fe Fumarate-FA (PRENATAL VITAMIN) 27-0.8 MG TABS 2020 at Unknown time  Yes Yes      Facility-Administered Medications: None     Allergies   Allergies   Allergen Reactions     Reglan [Metoclopramide] Other (See Comments)     Light headed         Immunization History  "  Immunization History   Administered Date(s) Administered     Influenza Vaccine IM > 6 months Valent IIV4 2017, 09/10/2019     TDAP Vaccine (Adacel) 2017, 10/22/2019       Past Medical History:   Diagnosis Date     Diabetes (H)     w/ first pregnancy-on insulin       Past Surgical History:   Procedure Laterality Date      SECTION N/A 2017    Procedure:  SECTION;   section;  Surgeon: Blanca Villarreal MD;  Location:  L+D     LAPAROSCOPIC CHOLECYSTECTOMY WITH CHOLANGIOGRAMS N/A 2017    Procedure: LAPAROSCOPIC CHOLECYSTECTOMY WITH CHOLANGIOGRAMS;  LAPAROSCOPIC CHOLECYSTECTOMY WITH CHOLANGIOGRAMS ;  Surgeon: Mary Conroy MD;  Location:  OR       Clinic vitals from today: Temp 98.1  F (36.7  C) (Oral)   Resp 18   Ht 1.626 m (5' 4\")   Wt 88.9 kg (196 lb)   LMP 2019 (Exact Date)   BMI 33.64 kg/m      Abdomen: gravid, single vertex fetus, non-tender  Constitutional: healthy, alert, active and no distress   Extremities: NT, no edema  Neurologic: Awake, alert, oriented x3  Neuropsychiatric: General: normal, calm and normal eye contact  Heart: Regular rate and rhythm  Lungs: clear to ausculation bilaterally    Disha Tsang MD    "

## 2020-01-10 NOTE — OP NOTE
PREOPERATIVE DIAGNOSES:      1.  Prior  x 1,  desiring repeat.    2.  Intrauterine pregnancy at 40w2d gestation.        POSTOPERATIVE DIAGNOSES:    1.  Same, plus  2.  Liveborn female infant    PROCEDURE:  Repeat  delivery.        SURGEON:  Disha Tsang MD        FINDINGS:      1.  Liveborn infant in the LOT presentation, APGARs 9 and 9 at 1 and 5 mins, 8 lb 4 oz.      PROCEDURE NOTE:  The patient was consented for repeat  delivery and was taken to the operating room where spinal anesthetic was administered.  She was prepped and draped in the dorsal supine position with a left lateral tilt.  After assuring adequate anesthesia and after a time out was performed, Pfannenstiel incision was made at the site of previous incision.  Dissection was carried with electrocautery to the fascia which was also opened with both sharp dissection and electrocautery.  The myofascial plane was developed in a cephalad and caudad directions.  The recti muscles were gently split in the midline.  The peritoneal cavity entered bluntly without difficulty.  No intra-abdominal adhesions were noted.  An Kris retractor was placed.  A low transverse hysterotomy was made and membranes ruptured bluntly.  Fluid was clear.  The uterine incision was extended digitally.  The fetal head was gently elevated and flexed and gently delivered through the incision.  The remainder of the infant followed without any difficulty.  Infant was immediately vigorous and did cry spontaneously.  The mouth and nares were suctioned.  After a 60 second delay the cord was doubly clamped and cut and the baby was taken to the warmer.  The placenta delivered spontaneously, was inspected and found to be intact with a 3-vessel cord.  The uterus was cleared of all clots and debris.  The uterine incision was inspected and no extensions were noted. The uterus was closed with a running locking stitch of 1 Vicryl, and due to excellent hemostasis, no  second layer was performed. Both adnexae were visualized and tubes and ovaries appeared normal bilaterally.  The colic gutters were irrigated and cleared of all clots and debris.  The uterine incision was inspected again and found to be hemostatic. The Kris retractor was removed at this point.  Sponge and instrument counts were reported to me as correct.  Subfascial space was inspected and bleeding vessels were cauterized.  The fascia was reapproximated with a running looped 0 PDS suture. The skin was reapproximated with a running subcuticular stitch of 3-0 Monocryl.  Steri-Strips were placed across the incision.        She tolerated the procedure well and there were no complications noted.  Quantitative blood loss for the procedure was 629 mL.  Maternal blood type is Rh positive and therefore RhoGAM was not given.        The patient was returned to the PACU in good condition.  Urine output was adequate and clear throughout the procedure.            SOY CHIN MD

## 2020-01-10 NOTE — ANESTHESIA PROCEDURE NOTES
Peripheral nerve/Neuraxial procedure note : intrathecal  Pre-Procedure  Performed by El Atkinson MD  Location: OR      Pre-Anesthestic Checklist: patient identified, IV checked, site marked, risks and benefits discussed, informed consent, monitors and equipment checked, pre-op evaluation, at physician/surgeon's request and post-op pain management    Timeout  Correct Patient: Yes   Correct Procedure: Yes   Correct Site: Yes   Correct Laterality: Yes   Correct Position: Yes   Site Marked: Yes   .   Procedure Documentation    .    Procedure: intrathecal, .   Patient Position:sitting Insertion Site:L4-5  (midline approach)     Patient Prep/Sterile Barriers; povidone-iodine 7.5% surgical scrub.  .  Needle:  Spinal Needle (gauge): 25  Spinal/LP Needle Length (inches): 3.5 # of attempts: 1 and # of redirects:  Introducer used Introducer: 20 G .        Assessment/Narrative  Paresthesias: No.  .  .  clear CSF fluid removed . Sensory Level: T5

## 2020-01-10 NOTE — ANESTHESIA PREPROCEDURE EVALUATION
Anesthesia Pre-Procedure Evaluation    Patient: Paulo Lema   MRN: [id number] : 1990          Preoperative Diagnosis: History of  section [Z98.891]    Procedure(s):  REPEAT  SECTION    Past Medical History:   Diagnosis Date     Diabetes (H)     w/ first pregnancy-on insulin     Past Surgical History:   Procedure Laterality Date      SECTION N/A 2017    Procedure:  SECTION;   section;  Surgeon: Blanca Villarreal MD;  Location: [facility] L+D     LAPAROSCOPIC CHOLECYSTECTOMY WITH CHOLANGIOGRAMS N/A 2017    Procedure: LAPAROSCOPIC CHOLECYSTECTOMY WITH CHOLANGIOGRAMS;  LAPAROSCOPIC CHOLECYSTECTOMY WITH CHOLANGIOGRAMS ;  Surgeon: Mary Conroy MD;  Location: [facility] OR     Anesthesia Evaluation     . Pt has had prior anesthetic. Type: General and Regional    No history of anesthetic complications          ROS/MED HX    ENT/Pulmonary:  - neg pulmonary ROS     Neurologic:  - neg neurologic ROS     Cardiovascular:  - neg cardiovascular ROS       METS/Exercise Tolerance:     Hematologic:  - neg hematologic  ROS       Musculoskeletal:  - neg musculoskeletal ROS       GI/Hepatic:  - neg GI/hepatic ROS       Renal/Genitourinary:  - ROS Renal section negative       Endo: Comment: GDM and obesity    (+) type II DM Obesity, .      Psychiatric:  - neg psychiatric ROS       Infectious Disease:         Malignancy:         Other:                          Physical Exam  Normal systems: cardiovascular, pulmonary and dental    Airway   Mallampati: II  TM distance: >3 FB  Neck ROM: full    Dental     Cardiovascular   Rhythm and rate: regular and normal      Pulmonary    breath sounds clear to auscultation    Other findings: Lab Test        01/09/20     10/22/19     06/03/19     11/29/17                       0850          1147          0900          0645          WBC           --          10.9         6.3          5.7           HGB          12.6         11.8         12.2          11.9          MCV           --          94           90           92            PLT           --          295          268          370            Lab Test        11/29/17 11/28/17 11/13/17 11/12/17      --          05/21/17                       0645          1909          0618          2245           --           2240          NA           142          140           --           --           --          137           POTASSIUM    3.6          3.4           --           --           --          3.4           CHLORIDE     109          107           --           --           --          104           CO2          26           26            --           --           --          26            BUN          10           11            --           --           --          6*            CR           0.85         0.77          --          1.64*         --          0.52          ANIONGAP     7            7             --           --           --          7             JUSTICE          7.9*         8.6           --           --           --          8.9           GLC          82           103*         116*         97             < >        94                  Lab Results   Component Value Date    WBC 10.9 10/22/2019    HGB 12.6 01/09/2020    HCT 35.4 10/22/2019     10/22/2019     11/29/2017    POTASSIUM 3.6 11/29/2017    CHLORIDE 109 11/29/2017    CO2 26 11/29/2017    BUN 10 11/29/2017    CR 0.85 11/29/2017    GLC 82 11/29/2017    JUSTICE 7.9 (L) 11/29/2017    ALBUMIN 2.8 (L) 11/29/2017    PROTTOTAL 6.3 (L) 11/29/2017     (H) 11/29/2017    AST 81 (H) 11/29/2017    ALKPHOS 132 11/29/2017    BILITOTAL 0.4 11/29/2017    LIPASE 4,291 (H) 11/29/2017       Preop Vitals  BP Readings from Last 3 Encounters:   01/06/20 136/82   12/31/19 118/68   12/23/19 126/72    Pulse Readings from Last 3 Encounters:   07/17/18 80   06/05/18 65   01/03/18 88      Resp Readings from Last 3 Encounters:   06/05/18 16  "  12/13/17 16   11/29/17 18    SpO2 Readings from Last 3 Encounters:   06/05/18 97%   12/13/17 96%   11/29/17 97%      Temp Readings from Last 1 Encounters:   07/17/18 98  F (36.7  C) (Oral)    Ht Readings from Last 1 Encounters:   06/20/19 1.626 m (5' 4\")      Wt Readings from Last 1 Encounters:   01/06/20 89.4 kg (197 lb)    Estimated body mass index is 34.35 kg/m  as calculated from the following:    Height as of this encounter: 1.613 m (5' 3.5\").    Weight as of this encounter: 89.4 kg (197 lb).       Anesthesia Plan      History & Physical Review  History and physical reviewed and following examination; no interval change.    ASA Status:  2 .    NPO Status:  > 8 hours    Plan for Spinal   PONV prophylaxis:  Ondansetron (or other 5HT-3) and Dexamethasone or Solumedrol       Postoperative Care  Postoperative pain management:  IV analgesics, Oral pain medications and Multi-modal analgesia.      Consents  Anesthetic plan, risks, benefits and alternatives discussed with:  Patient.  Use of blood products discussed: Yes.   Use of blood products discussed with Patient.  Consented to blood products.  .                 El Atkinson MD                    .  "

## 2020-01-11 LAB — HGB BLD-MCNC: 10.1 G/DL (ref 11.7–15.7)

## 2020-01-11 PROCEDURE — 25000132 ZZH RX MED GY IP 250 OP 250 PS 637: Performed by: OBSTETRICS & GYNECOLOGY

## 2020-01-11 PROCEDURE — 25000128 H RX IP 250 OP 636: Performed by: OBSTETRICS & GYNECOLOGY

## 2020-01-11 PROCEDURE — 36415 COLL VENOUS BLD VENIPUNCTURE: CPT | Performed by: OBSTETRICS & GYNECOLOGY

## 2020-01-11 PROCEDURE — 85018 HEMOGLOBIN: CPT | Performed by: OBSTETRICS & GYNECOLOGY

## 2020-01-11 PROCEDURE — 40000083 ZZH STATISTIC IP LACTATION SERVICES 1-15 MIN

## 2020-01-11 PROCEDURE — 12000000 ZZH R&B MED SURG/OB

## 2020-01-11 RX ADMIN — KETOROLAC TROMETHAMINE 30 MG: 30 INJECTION, SOLUTION INTRAMUSCULAR at 02:34

## 2020-01-11 RX ADMIN — KETOROLAC TROMETHAMINE 30 MG: 30 INJECTION, SOLUTION INTRAMUSCULAR at 08:15

## 2020-01-11 RX ADMIN — SENNOSIDES AND DOCUSATE SODIUM 2 TABLET: 8.6; 5 TABLET ORAL at 08:13

## 2020-01-11 RX ADMIN — SENNOSIDES AND DOCUSATE SODIUM 2 TABLET: 8.6; 5 TABLET ORAL at 21:13

## 2020-01-11 RX ADMIN — IBUPROFEN 800 MG: 800 TABLET ORAL at 15:15

## 2020-01-11 RX ADMIN — OXYCODONE HYDROCHLORIDE 5 MG: 5 TABLET ORAL at 03:56

## 2020-01-11 RX ADMIN — ACETAMINOPHEN 975 MG: 325 TABLET, FILM COATED ORAL at 22:20

## 2020-01-11 RX ADMIN — IBUPROFEN 800 MG: 800 TABLET ORAL at 21:13

## 2020-01-11 RX ADMIN — ACETAMINOPHEN 975 MG: 325 TABLET, FILM COATED ORAL at 14:33

## 2020-01-11 RX ADMIN — ACETAMINOPHEN 975 MG: 325 TABLET, FILM COATED ORAL at 08:14

## 2020-01-11 RX ADMIN — OXYCODONE HYDROCHLORIDE 5 MG: 5 TABLET ORAL at 19:17

## 2020-01-11 RX ADMIN — OXYCODONE HYDROCHLORIDE 5 MG: 5 TABLET ORAL at 10:22

## 2020-01-11 RX ADMIN — OXYCODONE HYDROCHLORIDE 5 MG: 5 TABLET ORAL at 23:14

## 2020-01-11 NOTE — LACTATION NOTE
Lactation visit. Patient states breast feeding is going well. She did not have any issues with breast feeding her first child. No concerns or questions at this time.

## 2020-01-11 NOTE — PLAN OF CARE
Data: BP is slightly elevated. Pt refused having headache, changes in vision, epigastric pain. Postpartum checks within normal limits - see flow record. Cardenas catheter patent and draining adequate amount of urine. Island dressing is clear and intact.  Action: Patient medicated during the shift for incisional pain. See MAR. Patient reassessed within 1 hour after each medication and pain was improved - patient stated she was comfortable.   Response: Positive attachment behaviors observed with infant.  present and very supportive. Continue to monitor.

## 2020-01-11 NOTE — PROGRESS NOTES
[facility] Obstetrics Post-Op / Progress Note         Assessment and Plan:    Assessment:   Post-operative day #1  Low transverse repeat  section  L&D complications: Scheduled, repeat  section      Doing well.  Clean wound without signs of infection.  No immediate surgical complications identified.  No excessive bleeding  Pain well-controlled.      Plan:   Ambulation encouraged  Monitor wound for signs of infection  Pain control measures as needed  Reportable signs and symptoms dicussed with the patient  Anticipate discharge in 1-2 days           Interval History:   Doing well.  Pain is well-controlled.  No fevers.  No history of wound drainage, warmth or significant erythema.  Good appetite.  Denies chest pain, shortness of breath, nausea or vomiting.  Ambulatory.  Breastfeeding well.          Significant Problems:      Past Medical History:   Diagnosis Date     Diabetes (H)     w/ first pregnancy-on insulin             Review of Systems:    The patient denies any chest pain, shortness of breath, excessive pain, fever, chills, purulent drainage from the wound, nausea or vomiting.          Medications:   All medications related to the patient's surgery have been reviewed          Physical Exam:     All vitals stable  Patient Vitals for the past 12 hrs:   BP Temp src Heart Rate Resp SpO2   20 0354 110/66 Oral 65 16 98 %   20 0234 -- -- -- -- 97 %   01/10/20 2319 123/72 Oral 61 14 96 %   01/10/20 2020 -- -- -- -- 99 %     Wound clean and dry  Ext NT          Data:     Hemoglobin   Date Value Ref Range Status   2020 10.1 (L) 11.7 - 15.7 g/dL Final   2020 12.6 11.7 - 15.7 g/dL Final   10/22/2019 11.8 11.7 - 15.7 g/dL Final   2019 12.2 11.7 - 15.7 g/dL Final   2017 11.9 11.7 - 15.7 g/dL Final     -    Maxwell eBnito MD  2020 8:02 AM

## 2020-01-11 NOTE — PLAN OF CARE
VSS. Breastfeeding  frequently and with minimal staff assistance. Tolerating a regular diet. Cardenas catheter in place draining adequate amounts. UTV incision, free of s/s of infection at this time. Postpartum bleeding and fundal checks WNL. Toradol, Tylenol, and oxycodone provided for incisional pain. Pt dangled legs at edge of bed and stood at bedside at beginning of shift, declines AM ambulation until after breakfast. Bonding well with , spouse Brennan present and assistive with cares.

## 2020-01-11 NOTE — PLAN OF CARE
Pt up and froilan. Voiding without difficulty since castañeda removal. Tylenol and ibuprofen effective for pain management. Bonding well with baby, responding to infant cues. Breastfeeding without assistance, understands to call if assistance is needed. Incision site covered with steri-strips with scant amount of dried drainage. Fundus firm and midline. Iv discontinued. BC and PPD completed. Videos watched. Education completed. Continue to monitor.    Sharonda Balderas RN

## 2020-01-12 PROCEDURE — 12000000 ZZH R&B MED SURG/OB

## 2020-01-12 PROCEDURE — 25000132 ZZH RX MED GY IP 250 OP 250 PS 637: Performed by: OBSTETRICS & GYNECOLOGY

## 2020-01-12 RX ADMIN — ACETAMINOPHEN 975 MG: 325 TABLET, FILM COATED ORAL at 14:17

## 2020-01-12 RX ADMIN — OXYCODONE HYDROCHLORIDE 5 MG: 5 TABLET ORAL at 12:08

## 2020-01-12 RX ADMIN — IBUPROFEN 800 MG: 800 TABLET ORAL at 15:12

## 2020-01-12 RX ADMIN — ACETAMINOPHEN 975 MG: 325 TABLET, FILM COATED ORAL at 22:06

## 2020-01-12 RX ADMIN — SENNOSIDES AND DOCUSATE SODIUM 2 TABLET: 8.6; 5 TABLET ORAL at 09:08

## 2020-01-12 RX ADMIN — OXYCODONE HYDROCHLORIDE 5 MG: 5 TABLET ORAL at 16:26

## 2020-01-12 RX ADMIN — IBUPROFEN 800 MG: 800 TABLET ORAL at 09:08

## 2020-01-12 RX ADMIN — OXYCODONE HYDROCHLORIDE 5 MG: 5 TABLET ORAL at 06:12

## 2020-01-12 RX ADMIN — OXYCODONE HYDROCHLORIDE 5 MG: 5 TABLET ORAL at 03:10

## 2020-01-12 RX ADMIN — ACETAMINOPHEN 975 MG: 325 TABLET, FILM COATED ORAL at 06:12

## 2020-01-12 RX ADMIN — SENNOSIDES AND DOCUSATE SODIUM 2 TABLET: 8.6; 5 TABLET ORAL at 21:11

## 2020-01-12 RX ADMIN — OXYCODONE HYDROCHLORIDE 5 MG: 5 TABLET ORAL at 21:17

## 2020-01-12 RX ADMIN — IBUPROFEN 800 MG: 800 TABLET ORAL at 03:10

## 2020-01-12 RX ADMIN — IBUPROFEN 800 MG: 800 TABLET ORAL at 21:11

## 2020-01-12 NOTE — PLAN OF CARE
Pt up and froilan. Voiding without difficulty. Fundus firm and midline. Incision site covered with seri-strips with scant amount of dried drainage. Oxy, ibuprofen, and tylenol effective for pain management. Bonding well with baby, responding to infant cues. Breastfeeding with minimal assistance, cluster feeding seems present, patient and father educated. Education completed. Continue to monitor.    Sharonda Balderas RN

## 2020-01-12 NOTE — PLAN OF CARE
Pt. VSS. Pain managed with scheduled tylenol, PRN ibuprofen, and PRN oxy. Incision ELIA, without signs of infection. Postpartum check, WDL. Voiding adequately. Ambulating well. Independent in personal and infant cares. Breastfeeding infant. Attentive to infant needs and bonding well with infant. FOB at bedside, supportive.

## 2020-01-13 VITALS
DIASTOLIC BLOOD PRESSURE: 74 MMHG | HEART RATE: 78 BPM | WEIGHT: 196 LBS | TEMPERATURE: 97.8 F | RESPIRATION RATE: 18 BRPM | OXYGEN SATURATION: 99 % | HEIGHT: 64 IN | BODY MASS INDEX: 33.46 KG/M2 | SYSTOLIC BLOOD PRESSURE: 133 MMHG

## 2020-01-13 PROBLEM — O34.219 PREVIOUS CESAREAN DELIVERY, ANTEPARTUM CONDITION OR COMPLICATION: Status: RESOLVED | Noted: 2017-11-12 | Resolved: 2020-01-13

## 2020-01-13 PROBLEM — Z98.891 HISTORY OF CESAREAN SECTION: Status: ACTIVE | Noted: 2019-10-23

## 2020-01-13 PROBLEM — O09.619 HIGH RISK PREGNANCY IN YOUNG PRIMIGRAVIDA: Status: RESOLVED | Noted: 2017-10-24 | Resolved: 2020-01-13

## 2020-01-13 PROCEDURE — 25000132 ZZH RX MED GY IP 250 OP 250 PS 637: Performed by: OBSTETRICS & GYNECOLOGY

## 2020-01-13 RX ORDER — IBUPROFEN 600 MG/1
600 TABLET, FILM COATED ORAL EVERY 6 HOURS PRN
Qty: 30 TABLET | Refills: 0 | Status: SHIPPED | OUTPATIENT
Start: 2020-01-13 | End: 2022-05-23

## 2020-01-13 RX ORDER — OXYCODONE HYDROCHLORIDE 5 MG/1
5 TABLET ORAL EVERY 6 HOURS PRN
Qty: 12 TABLET | Refills: 0 | Status: SHIPPED | OUTPATIENT
Start: 2020-01-13 | End: 2022-05-23

## 2020-01-13 RX ADMIN — IBUPROFEN 800 MG: 800 TABLET ORAL at 03:14

## 2020-01-13 RX ADMIN — OXYCODONE HYDROCHLORIDE 5 MG: 5 TABLET ORAL at 09:20

## 2020-01-13 RX ADMIN — ACETAMINOPHEN 975 MG: 325 TABLET, FILM COATED ORAL at 06:05

## 2020-01-13 RX ADMIN — IBUPROFEN 800 MG: 800 TABLET ORAL at 09:20

## 2020-01-13 NOTE — LACTATION NOTE
This note was copied from a baby's chart.  ARA visit. Her baby has been nursing well on both sides. No issues noted. She also successfully nursed her last daughter.  She is aware she may call prn. ARA reviewed symptoms of mastitis and when to call her doctor.  Her milk is in and she has no questions.

## 2020-01-13 NOTE — PROGRESS NOTES
All discharge instructions were reviewed with patient by writer and all questions answered.  Patient left unit with infant and all belongings at  1042 .

## 2020-01-13 NOTE — PLAN OF CARE
Patient meeting expected goals.  Is up independent in room, meeting all personal and infant needs. VSS.  Pain is being managed with Tylenol, Ibuprofen and Oxycodone.  Incision to low abdomen is with steri strips, well approximated, no drainage or signs of infection noted. Using ABD binder. Breastfeeding is going well; milk is in.  Bonding well with infant and performing all cares. Spouse is supportive and at bedside. Patient is stable and will be ready for discharge this morning.  Patient is aware to follow up in 6 weeks for postpartum visit.  No breast pump needed.

## 2020-01-13 NOTE — DISCHARGE INSTRUCTIONS
Postop  Birth Instructions  Follow up in 6 weeks for Postpartum visit.   Lactation: [phone]    Activity       Do not lift more than 10 pounds for 6 weeks after surgery.  Ask family and friends for help when you need it.    No driving until you have stopped taking your pain medications (usually two weeks after surgery).    No heavy exercise or activity for 6 weeks.  Don't do anything that will put a strain on your surgery site.    Don't strain when using the toilet.  Your care team may prescribe a stool softener if you have problems with your bowel movements.     To care for your incision:       Keep the incision clean and dry.    Do not soak your incision in water. No swimming or hot tubs until it has fully healed. You may soak in the bathtub if the water level is below your incision.    Do not use peroxide, gel, cream, lotion, or ointment on your incision.    Adjust your clothes to avoid pressure on your surgery site (check the elastic in your underwear for example).     You may see a small amount of clear or pink drainage and this is normal.  Check with your health care provider:       If the drainage increases or has an odor.    If the incision reddens, you have swelling, or develop a rash.    If you have increased pain and the medicine we prescribed doesn't help.    If you have a fever above 100.4 F (38 C) with or without chills when placing thermometer under your tongue.   The area around your incision (surgery wound), will feel numb.  This is normal. The numbness should go away in less than a year.     Keep your hands clean:  Always wash your hands before touching your incision (surgery wound). This helps reduce your risk of infection. If your hands aren't dirty, you may use an alcohol hand-rub to clean your hands. Keep your nails clean and short.    Call your healthcare provider if you have any of these symptoms:       You soak a sanitary pad with blood within 1 hour, or you see blood clots  larger than a golf ball.    Bleeding that lasts more than 6 weeks.    Vaginal discharge that smells bad.    Severe pain, cramping or tenderness in your lower belly area.    A need to urinate more frequently (use the toilet more often), more urgently (use the toilet very quickly), or it burns when you urinate.    Nausea and vomiting.    Redness, swelling or pain around a vein in your leg.    Problems breastfeeding or a red or painful area on your breast.    Chest pain and cough or are gasping for air.    Problems with coping with sadness, anxiety or depression. If you have concerns about hurting yourself or the baby, call your provider immediately.      You have questions or concerns after you return home.

## 2020-01-13 NOTE — PLAN OF CARE
Patient vital signs stable and meeting expected outcomes.  Breastfeeding well with minimal assistance from staff.  Pt thinks her milk is in.  Up independently and voiding adequately.  Pain well controlled with tylenol, ibuprofen, and oxycodone.  Incision WDL.  Able to perform all cares for self and infant.  Bonding well with baby.   present and supportive at bedside.  Plan to discharge home today.  Will continue to monitor.

## 2020-01-13 NOTE — PLAN OF CARE
Assumed care from Inez TRACY RN at 1900.  Patient stable and doing well.  Will continue to monitor.

## 2020-01-13 NOTE — DISCHARGE SUMMARY
"Discharge Summary        2020     Paulo Lema MRN# [id number]   YOB: 1990 Age: 29 year old     Date of Admission:  1/10/2020  Date of Discharge:  2020  Admitting Physician:  Disha Tsang MD  Discharge Physician:             Josiah Palomo MD  Discharging Service:  Obstetrics & Gynecology    [place] clinic: [facility]  Primary Provider: Disha Tsang          Admission Diagnoses:   History of  section [Z98.891]          Discharge Diagnosis:   Patient Active Problem List   Diagnosis     Gallstone pancreatitis     ALLYSON (generalized anxiety disorder)     History of  section      delivery delivered                Discharge Disposition:    Discharged to home           Condition on Discharge:   Discharge condition: Stable    Discharge vitals: Blood pressure 112/58, pulse 78, temperature 98.2  F (36.8  C), temperature source Oral, resp. rate 16, height 1.626 m (5' 4\"), weight 88.9 kg (196 lb), last menstrual period 2019, SpO2 99 %, unknown if currently breastfeeding.   Code status on discharge: Full Code           Procedures / Labs / Imaging:   Procedures:   All laboratory data reviewed.  Imaging: N/A          Medications Prior to Admission:     Medications Prior to Admission   Medication Sig Dispense Refill Last Dose     Prenatal Vit-Fe Fumarate-FA (PRENATAL VITAMIN) 27-0.8 MG TABS    2020 at Unknown time     Misc. Devices (BREAST PUMP) MISC 1 each as needed (Patient not taking: Reported on 2019) 1 each 0 Not Taking             Discharge Medications:     Current Discharge Medication List      START taking these medications    Details   ibuprofen (ADVIL/MOTRIN) 600 MG tablet Take 1 tablet (600 mg) by mouth every 6 hours as needed for pain Take w/ food  Qty: 30 tablet, Refills: 0    Associated Diagnoses: Postoperative pain      oxyCODONE (ROXICODONE) 5 MG tablet Take 1 tablet (5 mg) by mouth every 6 hours as needed for breakthrough pain or " "severe pain  Qty: 12 tablet, Refills: 0    Associated Diagnoses: Postoperative pain         CONTINUE these medications which have NOT CHANGED    Details   Prenatal Vit-Fe Fumarate-FA (PRENATAL VITAMIN) 27-0.8 MG TABS       Misc. Devices (BREAST PUMP) MISC 1 each as needed  Qty: 1 each, Refills: 0    Associated Diagnoses: Supervision of other normal pregnancy, antepartum                   Consultations:   No consultations were requested during this admission             Brief History of Illness:   This patient was a 29 year old pregnant female,  2, para 1001, who presented with intrauterine pregnancy of gestational age  40w2d.          Hospital Course:   Patient was admitted to the OR as a same-day admission and underwent a  section as noted in the separate operative report.  Her postop course was unremarkable.  She had good urine output for the first 24 hours and had her Cardenas catheter removed on POD#1.  She was tolerating clear liquids and began ambulating on POD#1.  By POD#2 she was passing flatus and tolerating regular diet well.  By POD#3 she was ready for D/C home.  Her incision remained clean, dry, and intact without erythema or induration.  She was D/C'd home POD#3 with instructions to f/u in 6 weeks.          Physical Exam:    O:  Blood pressure 112/58, pulse 78, temperature 98.2  F (36.8  C), temperature source Oral, resp. rate 16, height 1.626 m (5' 4\"), weight 88.9 kg (196 lb), last menstrual period 2019, SpO2 99 %, unknown if currently breastfeeding.        No intake or output data in the 24 hours ending 20 0732        Abdomen - Non-distended, Normoactive bowel sounds, soft, appropriately tender; Fundus firm, at umbilicus, nontender        Dressing/Incision - clean, dry, intact        Extremities - No calf tenderness           Data:  Hemoglobin   Date Value Ref Range Status   2020 10.1 (L) 11.7 - 15.7 g/dL Final   2020 12.6 11.7 - 15.7 g/dL Final     No results " found for: RUBELLAABIGG   Lab Results   Component Value Date    ABO A 01/09/2020           Lab Results   Component Value Date    RH Pos 01/09/2020               Discharge Instructions and Follow-Up:   Discharge diet: Regular   Discharge activity: Activity as tolerated   Discharge follow-up: Follow up with Dr. Tsang in 6 weeks   Outpatient therapy: None   Home Care agency: None   Supplies and equipment: Breast Pump   Lines and drains: None   Wound care: Keep Dry, Wash with soap and water, protect from sunlight, do not soak in water for 14 days (Swimming, Baths), but may shower.  Please call if wound becomes red, swollen, hot or begins draining pus like fluid.   Other instructions: None     Josiah Palomo MD

## 2020-01-16 ENCOUNTER — TELEPHONE (OUTPATIENT)
Dept: OBGYN | Facility: CLINIC | Age: 30
End: 2020-01-16

## 2020-02-25 ENCOUNTER — PRENATAL OFFICE VISIT (OUTPATIENT)
Dept: OBGYN | Facility: CLINIC | Age: 30
End: 2020-02-25
Payer: COMMERCIAL

## 2020-02-25 VITALS
BODY MASS INDEX: 29.88 KG/M2 | WEIGHT: 175 LBS | HEIGHT: 64 IN | SYSTOLIC BLOOD PRESSURE: 114 MMHG | DIASTOLIC BLOOD PRESSURE: 66 MMHG

## 2020-02-25 DIAGNOSIS — Z12.4 PAP SMEAR FOR CERVICAL CANCER SCREENING: ICD-10-CM

## 2020-02-25 PROCEDURE — 99207 ZZC POST PARTUM EXAM: CPT | Performed by: OBSTETRICS & GYNECOLOGY

## 2020-02-25 PROCEDURE — G0145 SCR C/V CYTO,THINLAYER,RESCR: HCPCS | Performed by: OBSTETRICS & GYNECOLOGY

## 2020-02-25 ASSESSMENT — PATIENT HEALTH QUESTIONNAIRE - PHQ9: SUM OF ALL RESPONSES TO PHQ QUESTIONS 1-9: 0

## 2020-02-25 ASSESSMENT — MIFFLIN-ST. JEOR: SCORE: 1508.79

## 2020-02-25 NOTE — NURSING NOTE
"Chief Complaint   Patient presents with     Postpartum Care     6wk PP visit.        Initial /66   Ht 1.626 m (5' 4\")   Wt 79.4 kg (175 lb)   LMP 2019 (Exact Date)   Breastfeeding Yes   BMI 30.04 kg/m   Estimated body mass index is 30.04 kg/m  as calculated from the following:    Height as of this encounter: 1.626 m (5' 4\").    Weight as of this encounter: 79.4 kg (175 lb).  BP completed using cuff size: regular    Questioned patient about current smoking habits.  Pt. has never smoked.          The following HM Due: pap smear      The following patient reported/Care Every where data was sent to:  P ABSTRACT QUALITY INITIATIVES [65110]  Sharonda Au LPN             "

## 2020-02-25 NOTE — PROGRESS NOTES
"Paulo is here for a 6-week postpartum checkup.    She had a  of a viable girl, weight 8 pounds 4 oz., with no complications. Date of delivery was 1/10/20. Since delivery, she has been breast feeding.  She has no signs of infection, bleeding or other complications.  She is not pregnant.  We discussed contraceptions and she has chosen condoms.    Post partum tubal: No  History of Gestational Diabetes? No  Type of Delivery:    Feeding Method:  Breast  If initiated breast feeding and stopped, how long did you breast feed?:  Not applicable    REVIEW OF SYSTEMS:  Negative in all systems  Contraception Plan: condoms    EXAM:  /66   Ht 1.626 m (5' 4\")   Wt 79.4 kg (175 lb)   LMP 2019 (Exact Date)   Breastfeeding Yes   BMI 30.04 kg/m    ABDOMEN: soft, non tender, good bowel sounds, without masses rebound, guarding or tenderness. Incision clean, dry, and intact   PELVIC:  External genitalia; normal without lesion.   Vagina: normal mucosa and rugae, no discharge.  Cervix: multiparous, well healed, without lesion.  Adnexa: non tender, without masses.  EXTREMITIES:  warm to touch, good pulses, no ankle edema or calf tenderness.  NEUROLOGIC: grossly normal.    ASSESSMENT:   6-week postpartum exam after repeat c/s.    PLAN:    Contraception methods discussed  Exercise encouraged  Follow up in 1 year.  One-hour glucose tolerance test needed? No  Condoms for contraception.    Disha Tsang MD      "

## 2020-02-28 LAB
COPATH REPORT: NORMAL
PAP: NORMAL

## 2020-03-30 ENCOUNTER — MEDICAL CORRESPONDENCE (OUTPATIENT)
Dept: HEALTH INFORMATION MANAGEMENT | Facility: CLINIC | Age: 30
End: 2020-03-30

## 2020-05-15 ENCOUNTER — MEDICAL CORRESPONDENCE (OUTPATIENT)
Dept: HEALTH INFORMATION MANAGEMENT | Facility: CLINIC | Age: 30
End: 2020-05-15

## 2020-07-08 NOTE — PROGRESS NOTES
Routine OB Visit:    S: Feeling much better after starting reglan for nausea. No longer vomiting, mild nausea just upon waking now. No bleeding or cramping.     O: /60 (BP Location: Left arm, Patient Position: Chair, Cuff Size: Adult Regular)  Temp 98  F (36.7  C) (Oral)  Wt 175 lb 4.8 oz (79.5 kg)  LMP 02/10/2017 (Exact Date)  BMI 31.05 kg/m2   Gen: resting comfortably, in NAD  See Prenatal flowsheet    A: Paulo Lema is a 26 year old female  at 13w6d, here for routine OB care.    P:   1)A+/RI. Anatomy scan ordered today. Desired 1st trimester screen but did not acquire in time. Will offer quad when GA appropriate.  2) Nausea: improved with reglan. Wean as able.   3) return to clinic in 4 weeks      Karissa Wilkins MD  Obstetrics and Gynecology  2017      no

## 2020-10-20 ENCOUNTER — TELEPHONE (OUTPATIENT)
Dept: OBGYN | Facility: CLINIC | Age: 30
End: 2020-10-20

## 2020-10-20 ENCOUNTER — NURSE TRIAGE (OUTPATIENT)
Dept: NURSING | Facility: CLINIC | Age: 30
End: 2020-10-20

## 2020-10-20 DIAGNOSIS — F41.9 ANXIETY: Primary | ICD-10-CM

## 2020-10-20 RX ORDER — ESCITALOPRAM OXALATE 20 MG/1
20 TABLET ORAL DAILY
Qty: 30 TABLET | Refills: 3 | Status: SHIPPED | OUTPATIENT
Start: 2020-10-20 | End: 2022-05-23

## 2020-10-20 NOTE — TELEPHONE ENCOUNTER
ALLYSON-7 SCORE 6/5/2018 7/17/2018 10/20/2020   Total Score - - 17 (severe anxiety)   Total Score 12 4 17

## 2020-10-20 NOTE — TELEPHONE ENCOUNTER
FNA  call :  Declines triage - just want physical appt to restart Lexapro  .   Presenting problem : Pt called. Refill on Anxiety Rx has been off for awhile but having more problems with anxiety and would like to restart this Rx .  Escitalopram/  Lexapro 20mg daily .     Disposition and recommendations : Sent to  for in person visit at [place] for PCP Neo if available for physical and wants to restart Lexapro the Dr Larson has order before for her in 7/2018.   Caller verbalizes understanding and denies further questions and will call back if further symptoms to triage or questions  . Josephine Caruso RN  - [place] Nurse Advisor

## 2020-10-20 NOTE — TELEPHONE ENCOUNTER
Pt calling.   She has been feeling very anxious and irritable over the last few months.     She was previously on Lexapro 20 mg and she is wanting to restart it. She had stopped it with her last pregnancy.    Pt called her PCP, and they will not restart it until she comes in for a physical. She has this scheduled for December, but doesn't think she should wait that long.     I did send the pt via my chart a ALLYSON-7 to fill out.    I did mention to the pt that this is usually something a PCP should do, but since she can't see them until December, she is hoping that you could at least send in an rx for her to get through until her appt.    Please advise.    Hawa FLORES RN

## 2020-10-20 NOTE — TELEPHONE ENCOUNTER
Rx sent for Lexapro. If she is feeling like things are getting worse instead of better, please check back in with us. I sent enough for a month with 3 refills to get her through to her appointment.    Disha Tsang MD

## 2020-12-27 ENCOUNTER — HEALTH MAINTENANCE LETTER (OUTPATIENT)
Age: 30
End: 2020-12-27

## 2021-04-24 ENCOUNTER — HEALTH MAINTENANCE LETTER (OUTPATIENT)
Age: 31
End: 2021-04-24

## 2021-06-21 NOTE — PROVIDER NOTIFICATION
11/12/17 0355   Provider Notification   Provider Name/Title Dr. Kumar   Method of Notification Phone   Request Evaluate - Remote   Notification Reason Status Update   No new orders received, will continue to monitor.   [facility]

## 2021-07-08 NOTE — RESULT ENCOUNTER NOTE
Group B Strep negative, noted.  Disha Tsang MD   [de-identified] : no physical exam as this is a telehpone visit

## 2021-10-09 ENCOUNTER — HEALTH MAINTENANCE LETTER (OUTPATIENT)
Age: 31
End: 2021-10-09

## 2022-02-01 NOTE — PROGRESS NOTES
Impression:  Acute hypoxemic respiratory failure  LLL atelectasis & elevated L hemidiaphragm   Encephalitis followed by neurology, On Plasmapheresis and pulse steroids   COVID 19 infection   Uterine lesion, likely fibroid      # Dystonic/choreiform-like movements, unclear etiology   #Differential: Autoimmune encephalitis vs. paraneoplastic etiology vs. other  - MR L Spine- Unremarkable; MR Head-with flair signal in medial thalami. MR Cervical Spine- Mild degenerative changes w/o spinal narrowing.  - Pan CT - Ring enhancing lesion in uterus that likely signifies fibroid seen on TVUS in december, possible hepatic lesion- may need mri for f/u   - Neurology following, extensive w/u in progress  - s/p LP 1/23, lots of labs pending  - Tumor Markers CEA, CA19, AFP,  all wnl   - Plasmapheresis every other day (as per transfusion medicine protocol) for 5 sessions (s/p 3/5 sessions (1/26, 1/28, 1/30))  - pulse steroids completed today 2/1, (Solumedrol 1 G x5 days per neuro crit)     # Acute Hypoxic respiratory failure likely 2/2 neurological dx s/p intubated   #Small (<1cm) R pneumothorax   #LLL Atelectasis with L hemidiaphragm elevation   #COVID infection (not pna)  - intubated 1/29  -on fentanyl and propofol, no longer requiring pressors   - SAT (1/31)-tracks, not following commands, pt very weak  -Bcx, ucx neg  -sputum cx with some gram pos and gram neg, will f/u ID if need abx  -L hemidiaphragm looks better today, will hold off on bronch  -lasix 40 for one dose given 1/31  -small r pnx on cxr, CTS onboard, conservative management and monitor for now   -Will try another sat today     #Hyperglycemia  -FS persistently elevated, not diabetic, likely 2/2 steroids  -c/w insulin gtt    #Ring enhancing lesion in uterus, likely fibroid    -noted on CT, likely fibroid when compared to prior TVUS in december as per radiology   - Gyn consulted, Pt unable to tolerate TVUS   - chronic elevated BHCG , negative urine pregnancy   - May repeat TVUS when stable and f/u Outpt    #Anemia    - Hgb stable for now, fluctuates   - keep type and screen active     # Oral Thrush  - Elevated fungitell 133  c/w Fluconazole 100 mg     # Hypertension-controlled   - c/w metoprolol tartrate 25 bid    # H/o anxiety-  pt sedated        DVT ppx: Lovenox   GI ppx: Protonix IV QD  Activity: Bed Rest  Diet: NPO with Tube Feed  Dispo: MICU  Code: FULL   [facility] Obstetrics Post-Op / Progress Note         Assessment and Plan:    Assessment:   Post-operative day #2  Low transverse repeat  section  L&D complications: Scheduled, repeat  section      Doing well.  Clean wound without signs of infection.  Normal healing wound.  No immediate surgical complications identified.  No excessive bleeding  Pain well-controlled.      Plan:   Ambulation encouraged  Breast feeding strategies discussed  Monitor wound for signs of infection  Pain control measures as needed  Reportable signs and symptoms dicussed with the patient  Anticipate discharge tomorrow           Interval History:   Doing well.  Pain is well-controlled.  No fevers.  No history of wound drainage, warmth or significant erythema.  Good appetite.  Denies chest pain, shortness of breath, nausea or vomiting.  Ambulatory.  Breastfeeding well.          Significant Problems:      Past Medical History:   Diagnosis Date     Diabetes (H)     w/ first pregnancy-on insulin             Review of Systems:    The patient denies any chest pain, shortness of breath, excessive pain, fever, chills, purulent drainage from the wound, nausea or vomiting.          Medications:     All medications related to the patient's surgery have been reviewed  Current Facility-Administered Medications   Medication     [START ON 2020] acetaminophen (TYLENOL) tablet 650 mg     acetaminophen (TYLENOL) tablet 975 mg     bisacodyl (DULCOLAX) Suppository 10 mg     dextrose 5% in lactated ringers infusion     diphenhydrAMINE (BENADRYL) capsule 25 mg    Or     diphenhydrAMINE (BENADRYL) injection 25 mg     hydrocortisone 2.5 % cream     ibuprofen (ADVIL/MOTRIN) tablet 800 mg     lactated ringers BOLUS 1,000 mL     lanolin ointment     lidocaine (LMX4) kit     lidocaine 1 % 0.1-1 mL     magnesium hydroxide (MILK OF MAGNESIA) suspension 30 mL     misoprostol (CYTOTEC) tablet 800 mcg     naloxone (NARCAN) injection 0.1-0.4 mg      No MMR Needed -  Assessment: Patient does not need MMR vaccine     NO Rho (D) immune globulin (RhoGam) needed - mother Rh POSITIVE     No Tdap Needed - Assessment: Patient does not need Tdap vaccine     ondansetron (ZOFRAN) injection 4 mg     oxyCODONE (ROXICODONE) tablet 5 mg     oxytocin (PITOCIN) 30 units in 500 mL 0.9% NaCl infusion     oxytocin (PITOCIN) 30 units in 500 mL 0.9% NaCl infusion     oxytocin (PITOCIN) injection 10 Units     prochlorperazine (COMPAZINE) injection 10 mg    Or     prochlorperazine (COMPAZINE) Suppository 25 mg     senna-docusate (SENOKOT-S/PERICOLACE) 8.6-50 MG per tablet 1 tablet    Or     senna-docusate (SENOKOT-S/PERICOLACE) 8.6-50 MG per tablet 2 tablet     simethicone (MYLICON) chewable tablet 80 mg     sodium chloride (PF) 0.9% PF flush 3 mL     sodium chloride (PF) 0.9% PF flush 3 mL     tranexamic acid (CYKLOKAPRON) infusion 1 g             Physical Exam:   Vitals were reviewed  All vitals stable  Temp: 97.8  F (36.6  C) Temp src: Oral BP: 129/70 Pulse: 86 Heart Rate: 76 Resp: 16        Wound clean and dry with minimal or no drainage.  Surrounding skin with minimal erythema.          Data:     All laboratory data related to this surgery reviewed  Hemoglobin   Date Value Ref Range Status   01/11/2020 10.1 (L) 11.7 - 15.7 g/dL Final   01/09/2020 12.6 11.7 - 15.7 g/dL Final   10/22/2019 11.8 11.7 - 15.7 g/dL Final   06/03/2019 12.2 11.7 - 15.7 g/dL Final   11/29/2017 11.9 11.7 - 15.7 g/dL Final     No imaging studies have been ordered    Isaiah Madrid MD

## 2022-05-21 ENCOUNTER — HEALTH MAINTENANCE LETTER (OUTPATIENT)
Age: 32
End: 2022-05-21

## 2022-05-23 ENCOUNTER — OFFICE VISIT (OUTPATIENT)
Dept: OBGYN | Facility: CLINIC | Age: 32
End: 2022-05-23
Payer: COMMERCIAL

## 2022-05-23 VITALS
SYSTOLIC BLOOD PRESSURE: 122 MMHG | HEIGHT: 64 IN | WEIGHT: 162 LBS | BODY MASS INDEX: 27.66 KG/M2 | DIASTOLIC BLOOD PRESSURE: 70 MMHG

## 2022-05-23 DIAGNOSIS — Z01.419 ENCOUNTER FOR GYNECOLOGICAL EXAMINATION WITHOUT ABNORMAL FINDING: Primary | ICD-10-CM

## 2022-05-23 DIAGNOSIS — Z86.32 HISTORY OF GESTATIONAL DIABETES: ICD-10-CM

## 2022-05-23 DIAGNOSIS — Z80.0 FAMILY HISTORY OF COLON CANCER: ICD-10-CM

## 2022-05-23 LAB — HBA1C MFR BLD: 5.2 % (ref 0–5.6)

## 2022-05-23 PROCEDURE — 99395 PREV VISIT EST AGE 18-39: CPT | Performed by: OBSTETRICS & GYNECOLOGY

## 2022-05-23 PROCEDURE — 83036 HEMOGLOBIN GLYCOSYLATED A1C: CPT | Performed by: OBSTETRICS & GYNECOLOGY

## 2022-05-23 PROCEDURE — 36415 COLL VENOUS BLD VENIPUNCTURE: CPT | Performed by: OBSTETRICS & GYNECOLOGY

## 2022-05-23 PROCEDURE — 82947 ASSAY GLUCOSE BLOOD QUANT: CPT | Performed by: OBSTETRICS & GYNECOLOGY

## 2022-05-23 NOTE — NURSING NOTE
"Chief Complaint   Patient presents with     Physical       Initial /70 (BP Location: Left arm, Patient Position: Chair, Cuff Size: Adult Regular)   Ht 1.626 m (5' 4\")   Wt 73.5 kg (162 lb)   LMP 2022 (Exact Date)   Breastfeeding No   BMI 27.81 kg/m   Estimated body mass index is 27.81 kg/m  as calculated from the following:    Height as of this encounter: 1.626 m (5' 4\").    Weight as of this encounter: 73.5 kg (162 lb).  BP completed using cuff size: regular    Questioned patient about current smoking habits.  Pt. has never smoked.          The following HM Due: NONE  Carmelita Walker CMA    "

## 2022-05-23 NOTE — PROGRESS NOTES
Edward is a 31 year old  female who presents for annual exam.     Besides routine health maintenance, she has no other health concerns today .    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    Regular menses? yes  Menses every 28 days.  Length of menses: 5 days    Her current contraception method is: none.  She  reports that she has never smoked. She has never used smokeless tobacco.    Patient is sexually active.  STD testing offered?  Declined  Last PHQ-9 score on record =   PHQ-9 SCORE 2020   PHQ-9 Total Score 0     Last GAD7 score on record =   ALLYSON-7 SCORE 10/20/2020   Total Score 17 (severe anxiety)   Total Score 17     Alcohol Score = 0    HEALTH MAINTENANCE:    Pap: 2020 nil  Lab Results   Component Value Date    PAP NIL 2020    PAP NIL 2017   Colonoscopy:  oreder today due to dad with colon cancer    Health maintenance updated:  yes    HISTORY:  OB History    Para Term  AB Living   2 2 2 0 0 2   SAB IAB Ectopic Multiple Live Births   0 0 0 0 2      # Outcome Date GA Lbr González/2nd Weight Sex Delivery Anes PTL Lv   2 Term 01/10/20 40w2d  3.75 kg (8 lb 4.3 oz) F CS-LTranv   TAYLOR      Name: REBECCA,FEMALE-EDWARD      Apgar1: 9  Apgar5: 9   1 Term 17 39w1d  3.5 kg (7 lb 11.5 oz) F CS-LTranv EPI N TAYLOR      Complications: Failure to Progress in First Stage, Gestational diabetes      Name: Katja Bhakta      Apgar1: 9  Apgar5: 9       Patient Active Problem List   Diagnosis  "    Gallstone pancreatitis     ALLYSON (generalized anxiety disorder)     History of  section     Past Surgical History:   Procedure Laterality Date      SECTION N/A 2017    Procedure:  SECTION;   section;  Surgeon: Blanca Villarreal MD;  Location: [facility] L[facility]D      SECTION N/A 1/10/2020    Procedure: REPEAT  SECTION;  Surgeon: Disha Tsang MD;  Location: [facility] OR     LAPAROSCOPIC CHOLECYSTECTOMY WITH CHOLANGIOGRAMS N/A 2017    Procedure: LAPAROSCOPIC CHOLECYSTECTOMY WITH CHOLANGIOGRAMS;  LAPAROSCOPIC CHOLECYSTECTOMY WITH CHOLANGIOGRAMS ;  Surgeon: Mary Conroy MD;  Location: [facility] OR      Social History     Tobacco Use     Smoking status: Never Smoker     Smokeless tobacco: Never Used   Substance Use Topics     Alcohol use: No      Problem (# of Occurrences) Relation (Name,Age of Onset)    Colon Cancer (1) Father    Family History Negative (1) Mother    Liver Cancer (1) Maternal Grandfather            Current Outpatient Medications   Medication Sig     Multiple Vitamin (MULTIVITAMIN ADULT PO)      No current facility-administered medications for this visit.     Allergies   Allergen Reactions     Reglan [Metoclopramide] Other (See Comments)     Light headed       Past medical, surgical, social and family histories were reviewed and updated in EPIC.    ROS:   12 point review of systems negative other than symptoms noted below or in the HPI.  No urinary frequency or dysuria, bladder or kidney problems    EXAM:  /70 (BP Location: Left arm, Patient Position: Chair, Cuff Size: Adult Regular)   Ht 1.626 m (5' 4\")   Wt 73.5 kg (162 lb)   LMP 2022 (Exact Date)   Breastfeeding No   BMI 27.81 kg/m     BMI: Body mass index is 27.81 kg/m .    PHYSICAL EXAM:  Constitutional:   Appearance: Well nourished, well developed, alert, in no acute distress  Neck:  Lymph Nodes:  No lymphadenopathy present    Thyroid:  Gland size normal, nontender, no nodules " or masses present  on palpation  Chest:  Respiratory Effort:  Breathing unlabored  Cardiovascular:    Heart: Auscultation:  Regular rate, normal rhythm, no murmurs present  Breasts: Inspection of Breasts:  No lymphadenopathy present., Palpation of Breasts and Axillae:  No masses present on palpation, no breast tenderness. and No nodularity, asymmetry or nipple discharge bilaterally.  Gastrointestinal:   Abdominal Examination:  Abdomen nontender to palpation, tone normal without rigidity or guarding, no masses present, umbilicus without lesions   Liver and Spleen:  No hepatomegaly present, liver nontender to palpation    Hernias:  No hernias present  Lymphatic: Lymph Nodes:  No other lymphadenopathy present  Skin:  General Inspection:  No rashes present, no lesions present, no areas of  discoloration  Neurologic:    Mental Status:  Oriented X3.  Normal strength and tone, sensory exam                grossly normal, mentation intact and speech normal.    Psychiatric:   Mentation appears normal and affect normal/bright.         Pelvic Exam:  External Genitalia:     Normal appearance for age, no discharge present, no tenderness present, no inflammatory lesions present, color normal  Vagina:     Normal vaginal vault without central or paravaginal defects, no discharge present, no inflammatory lesions present, no masses present  Bladder:     Nontender to palpation  Urethra:   Urethral Body:  Urethra palpation normal, urethra structural support normal   Urethral Meatus:  No erythema or lesions present  Cervix:     Appearance healthy, no lesions present, nontender to palpation, no bleeding present  Uterus:     Uterus: firm, normal sized and nontender, anteverted in position.   Adnexa:     No adnexal tenderness present, no adnexal masses present  Perineum:     Perineum within normal limits, no evidence of trauma, no rashes or skin lesions present  Anus:     Anus within normal limits, no hemorrhoids present  Inguinal Lymph  Nodes:     No lymphadenopathy present  Pubic Hair:     Normal pubic hair distribution for age  Genitalia and Groin:     No rashes present, no lesions present, no areas of discoloration, no masses present      COUNSELING:   Reviewed preventive health counseling, as reflected in patient instructions       Contraception       Family planning       Colorectal Cancer Screening    BMI: Body mass index is 27.81 kg/m .      ASSESSMENT:  31 year old female with satisfactory annual exam.    PLAN:  1. Encounter for gynecological examination without abnormal finding  Declines STI screening.  Pap smear up to date.  Mammogram not indicated today due to age.  Colonoscopy ordered (dad with colon cancer at age 40).  Labs due today, h/o GDM.  Immunizations - declines covid vaccination.      2. Family history of colon cancer  - Adult Gastro Ref - Procedure Only; Future    3. History of gestational diabetes  - Glucose; Future  - Hemoglobin A1c; Future  - Glucose  - Hemoglobin A1c     Aurelia Borjas MD

## 2022-05-23 NOTE — PROGRESS NOTES
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

## 2022-05-24 LAB
FASTING STATUS PATIENT QL REPORTED: NO
GLUCOSE BLD-MCNC: 83 MG/DL (ref 70–99)

## 2022-08-03 NOTE — TELEPHONE ENCOUNTER
Form received from: Pt; Paulo Lema     Form requesting following info/need: Continuous Leave, [employer]     MARCOS needed?: NA     Location of form: basket above angle's desk     When completed the route for return: Please fax when completed and inform pt its done- per pt doesn't need a copy    
Forms completed, faxed to [facility], copy sent to scan and original in [facility] nurse bin. Notified patient via [employer] message. Sharonda Au LPN    
Forms in process of completion. Sharonda Au LPN    
Ambulatory

## 2022-09-11 ENCOUNTER — HEALTH MAINTENANCE LETTER (OUTPATIENT)
Age: 32
End: 2022-09-11

## 2023-04-23 ENCOUNTER — PATIENT OUTREACH (OUTPATIENT)
Dept: CARE COORDINATION | Facility: CLINIC | Age: 33
End: 2023-04-23
Payer: COMMERCIAL

## 2023-05-07 ENCOUNTER — PATIENT OUTREACH (OUTPATIENT)
Dept: CARE COORDINATION | Facility: CLINIC | Age: 33
End: 2023-05-07
Payer: COMMERCIAL

## 2023-06-14 NOTE — PROGRESS NOTES
MFM ultrasound result and recommendations noted.  Disha Tsang MD   Birth Control Pills Pregnancy And Lactation Text: This medication should be avoided if pregnant and for the first 30 days post-partum.

## 2023-07-29 ENCOUNTER — HEALTH MAINTENANCE LETTER (OUTPATIENT)
Age: 33
End: 2023-07-29

## 2024-09-21 ENCOUNTER — HEALTH MAINTENANCE LETTER (OUTPATIENT)
Age: 34
End: 2024-09-21

## (undated) DEVICE — LINEN TOWEL PACK X10 5473

## (undated) DEVICE — CATH TRAY FOLEY 16FR SILICONE 907416

## (undated) DEVICE — Device

## (undated) DEVICE — ENDO TROCAR 05MM VERSAONE BLADELESS W/STD FIX CAN NONB5STF

## (undated) DEVICE — SYR 30ML LL W/O NDL 302832

## (undated) DEVICE — SUCTION CANISTER MEDIVAC LINER 3000ML W/LID 65651-530

## (undated) DEVICE — GLOVE PROTEXIS POWDER FREE SMT 6.5  2D72PT65X

## (undated) DEVICE — PREP CHLORAPREP 26ML TINTED ORANGE  260815

## (undated) DEVICE — CAP BABY PINK/BLUE IC-2

## (undated) DEVICE — LINEN HALF SHEET 5512

## (undated) DEVICE — SU VICRYL 0 CT-1 36" J346H

## (undated) DEVICE — SU VICRYL 4-0 PS-2 18" UND J496H

## (undated) DEVICE — DECANTER BAG 2002S

## (undated) DEVICE — LINEN BABY BLANKET 5434

## (undated) DEVICE — GLOVE PROTEXIS BLUE W/NEU-THERA 7.0  2D73EB70

## (undated) DEVICE — SOL NACL 0.9% IRRIG 1000ML BOTTLE 2F7124

## (undated) DEVICE — RAD RX ISOVUE 300 (50ML) 61% IOPAMIDOL CHARGE PER ML

## (undated) DEVICE — SOLIDIFIER FLUID RED 1500ML LIQUID KIT SYS POWDER ISOB1500

## (undated) DEVICE — SUCTION IRR STRYKERFLOW II W/TIP 250-070-520

## (undated) DEVICE — ENDO CANNULA 05MM VERSAONE UNIVERSAL UNVCA5STF

## (undated) DEVICE — SU MONOCRYL 0 CT 36" Y358H

## (undated) DEVICE — TRANSFER DEVICE BLOOD NDL HOLDER 364880

## (undated) DEVICE — SU VICRYL 0 CT 36" J358H

## (undated) DEVICE — LINEN FULL SHEET 5511

## (undated) DEVICE — DRSG TELFA ISLAND 4X10"

## (undated) DEVICE — SOL NACL 0.9% IRRIG 3000ML BAG 2B7477

## (undated) DEVICE — ESU CORD MONOPOLAR 10'  E0510

## (undated) DEVICE — LINEN TOWEL PACK X5 5464

## (undated) DEVICE — SU VICRYL 1 CTX 36" J371H

## (undated) DEVICE — SU VICRYL 0 CT-2 CR 8X18" J727D

## (undated) DEVICE — CONNECTOR STOPCOCK 3 WAY MALE LL HI-FLO MX9311L

## (undated) DEVICE — ESU GROUND PAD ADULT W/CORD E7507

## (undated) DEVICE — CATH CHOLANGIOGRAM KUMAR CC-019

## (undated) DEVICE — ENDO TROCAR BLUNT 100MM W/THRD ANCHOR BLUNTPORT BPT12STS

## (undated) DEVICE — DRSG STERI STRIP 1/4X3" R1541

## (undated) DEVICE — BLADE CLIPPER SGL USE 9680

## (undated) DEVICE — ESU ELEC BLADE 2.75" COATED/INSULATED E1455

## (undated) DEVICE — SOL WATER IRRIG 1000ML BOTTLE 2F7114

## (undated) DEVICE — CLIP APPLIER ENDO 05MM MED/LG 176630

## (undated) DEVICE — BAG CLEAR TRASH 1.3M 39X33" P4040C

## (undated) DEVICE — SOL NACL 0.9% INJ 250ML BAG 2B1322Q

## (undated) DEVICE — GLOVE PROTEXIS BLUE W/NEU-THERA 6.5  2D73EB65

## (undated) DEVICE — SU PDS II 0 CTX 60" Z990G

## (undated) DEVICE — PACK C-SECTION LF PL15OTA83B

## (undated) DEVICE — CATH TRAY FOLEY SURESTEP 16FR DRAIN BAG STATOCK A899916

## (undated) DEVICE — LINEN POUCH DBL 5427

## (undated) DEVICE — ENDO POUCH GOLD 10MM ECATCH 173050G

## (undated) DEVICE — GLOVE PROTEXIS W/NEU-THERA 6.5  2D73TE65

## (undated) DEVICE — STOCKING SLEEVE VASOPRESS COMPRESSION CALF MED VP501M

## (undated) DEVICE — GLOVE PROTEXIS W/NEU-THERA 5.5  2D73TE55

## (undated) DEVICE — GLOVE PROTEXIS MICRO 6.5  2D73PM65

## (undated) DEVICE — DRAPE LEGGINGS 8421

## (undated) DEVICE — SU MONOCRYL 3-0 PS-2 27" Y427H

## (undated) DEVICE — STOCKING SLEEVE VASOPRESS COMPRESSION CALF MED 18" VP501M

## (undated) DEVICE — DRAPE C-ARM 60X42" 1013

## (undated) DEVICE — GOWN XLG DISP 9545

## (undated) DEVICE — ESU PENCIL W/HOLSTER E2350H

## (undated) DEVICE — DRSG STERI STRIP 1/2X4" R1547

## (undated) RX ORDER — MORPHINE SULFATE 1 MG/ML
INJECTION, SOLUTION EPIDURAL; INTRATHECAL; INTRAVENOUS
Status: DISPENSED
Start: 2017-11-12

## (undated) RX ORDER — ONDANSETRON 2 MG/ML
INJECTION INTRAMUSCULAR; INTRAVENOUS
Status: DISPENSED
Start: 2017-11-29

## (undated) RX ORDER — FENTANYL CITRATE 50 UG/ML
INJECTION, SOLUTION INTRAMUSCULAR; INTRAVENOUS
Status: DISPENSED
Start: 2017-11-29

## (undated) RX ORDER — OXYTOCIN/0.9 % SODIUM CHLORIDE 30/500 ML
PLASTIC BAG, INJECTION (ML) INTRAVENOUS
Status: DISPENSED
Start: 2017-11-12

## (undated) RX ORDER — DEXAMETHASONE SODIUM PHOSPHATE 4 MG/ML
INJECTION, SOLUTION INTRA-ARTICULAR; INTRALESIONAL; INTRAMUSCULAR; INTRAVENOUS; SOFT TISSUE
Status: DISPENSED
Start: 2017-11-12

## (undated) RX ORDER — CEFAZOLIN SODIUM 2 G/100ML
INJECTION, SOLUTION INTRAVENOUS
Status: DISPENSED
Start: 2017-11-29

## (undated) RX ORDER — GLYCOPYRROLATE 0.2 MG/ML
INJECTION INTRAMUSCULAR; INTRAVENOUS
Status: DISPENSED
Start: 2017-11-29

## (undated) RX ORDER — PHENYLEPHRINE HCL IN 0.9% NACL 1 MG/10 ML
SYRINGE (ML) INTRAVENOUS
Status: DISPENSED
Start: 2017-11-12

## (undated) RX ORDER — MORPHINE SULFATE 1 MG/ML
INJECTION, SOLUTION EPIDURAL; INTRATHECAL; INTRAVENOUS
Status: DISPENSED
Start: 2020-01-10

## (undated) RX ORDER — NEOSTIGMINE METHYLSULFATE 1 MG/ML
VIAL (ML) INJECTION
Status: DISPENSED
Start: 2017-11-29

## (undated) RX ORDER — ONDANSETRON 2 MG/ML
INJECTION INTRAMUSCULAR; INTRAVENOUS
Status: DISPENSED
Start: 2017-11-12

## (undated) RX ORDER — KETOROLAC TROMETHAMINE 30 MG/ML
INJECTION, SOLUTION INTRAMUSCULAR; INTRAVENOUS
Status: DISPENSED
Start: 2017-11-29

## (undated) RX ORDER — HYDROMORPHONE HYDROCHLORIDE 1 MG/ML
INJECTION, SOLUTION INTRAMUSCULAR; INTRAVENOUS; SUBCUTANEOUS
Status: DISPENSED
Start: 2017-11-29

## (undated) RX ORDER — BUPIVACAINE HYDROCHLORIDE 2.5 MG/ML
INJECTION, SOLUTION EPIDURAL; INFILTRATION; INTRACAUDAL
Status: DISPENSED
Start: 2017-11-29

## (undated) RX ORDER — EPHEDRINE SULFATE 50 MG/ML
INJECTION, SOLUTION INTRAMUSCULAR; INTRAVENOUS; SUBCUTANEOUS
Status: DISPENSED
Start: 2017-11-12

## (undated) RX ORDER — OXYTOCIN/0.9 % SODIUM CHLORIDE 30/500 ML
PLASTIC BAG, INJECTION (ML) INTRAVENOUS
Status: DISPENSED
Start: 2020-01-10

## (undated) RX ORDER — GLYCOPYRROLATE 0.2 MG/ML
INJECTION INTRAMUSCULAR; INTRAVENOUS
Status: DISPENSED
Start: 2017-11-12

## (undated) RX ORDER — PROPOFOL 10 MG/ML
INJECTION, EMULSION INTRAVENOUS
Status: DISPENSED
Start: 2017-11-29

## (undated) RX ORDER — FENTANYL CITRATE 50 UG/ML
INJECTION, SOLUTION INTRAMUSCULAR; INTRAVENOUS
Status: DISPENSED
Start: 2020-01-10